# Patient Record
Sex: MALE | Race: BLACK OR AFRICAN AMERICAN | Employment: FULL TIME | URBAN - NONMETROPOLITAN AREA
[De-identification: names, ages, dates, MRNs, and addresses within clinical notes are randomized per-mention and may not be internally consistent; named-entity substitution may affect disease eponyms.]

---

## 2018-07-20 ENCOUNTER — OFFICE VISIT (OUTPATIENT)
Dept: INTERNAL MEDICINE | Age: 43
End: 2018-07-20
Payer: MEDICAID

## 2018-07-20 VITALS
OXYGEN SATURATION: 98 % | TEMPERATURE: 96.2 F | HEART RATE: 88 BPM | BODY MASS INDEX: 31.65 KG/M2 | DIASTOLIC BLOOD PRESSURE: 100 MMHG | HEIGHT: 74 IN | WEIGHT: 246.6 LBS | SYSTOLIC BLOOD PRESSURE: 140 MMHG

## 2018-07-20 DIAGNOSIS — Z11.59 ENCOUNTER FOR HEPATITIS C VIRUS SCREENING TEST FOR HIGH RISK PATIENT: ICD-10-CM

## 2018-07-20 DIAGNOSIS — F51.01 PRIMARY INSOMNIA: ICD-10-CM

## 2018-07-20 DIAGNOSIS — F41.9 ANXIETY: ICD-10-CM

## 2018-07-20 DIAGNOSIS — K64.9 HEMORRHOIDS, UNSPECIFIED HEMORRHOID TYPE: ICD-10-CM

## 2018-07-20 DIAGNOSIS — Z13.220 SCREENING FOR HYPERLIPIDEMIA: ICD-10-CM

## 2018-07-20 DIAGNOSIS — Z11.4 SCREENING FOR HIV (HUMAN IMMUNODEFICIENCY VIRUS): ICD-10-CM

## 2018-07-20 DIAGNOSIS — Z86.011 HISTORY OF MENINGIOMA OF THE BRAIN: ICD-10-CM

## 2018-07-20 DIAGNOSIS — Z91.89 ENCOUNTER FOR HEPATITIS C VIRUS SCREENING TEST FOR HIGH RISK PATIENT: ICD-10-CM

## 2018-07-20 DIAGNOSIS — I10 ESSENTIAL HYPERTENSION: ICD-10-CM

## 2018-07-20 DIAGNOSIS — Z00.00 ROUTINE GENERAL MEDICAL EXAMINATION AT A HEALTH CARE FACILITY: Primary | ICD-10-CM

## 2018-07-20 PROCEDURE — 99203 OFFICE O/P NEW LOW 30 MIN: CPT | Performed by: PHYSICIAN ASSISTANT

## 2018-07-20 RX ORDER — AMLODIPINE BESYLATE 5 MG/1
5 TABLET ORAL DAILY
COMMUNITY
End: 2018-08-17

## 2018-07-20 RX ORDER — AMITRIPTYLINE HYDROCHLORIDE 50 MG/1
50 TABLET, FILM COATED ORAL NIGHTLY
COMMUNITY
End: 2018-07-20

## 2018-07-20 RX ORDER — AMITRIPTYLINE HYDROCHLORIDE 50 MG/1
50 TABLET, FILM COATED ORAL NIGHTLY
Qty: 90 TABLET | Refills: 3 | Status: SHIPPED | OUTPATIENT
Start: 2018-07-20

## 2018-07-20 ASSESSMENT — ENCOUNTER SYMPTOMS
SINUS PRESSURE: 0
NAUSEA: 0
EYE PAIN: 0
COLOR CHANGE: 0
RHINORRHEA: 0
PHOTOPHOBIA: 0
EYE REDNESS: 0
BACK PAIN: 0
VOMITING: 0
CHEST TIGHTNESS: 0
COUGH: 0
DIARRHEA: 0
CONSTIPATION: 0
SHORTNESS OF BREATH: 0
SORE THROAT: 0
ABDOMINAL PAIN: 0
WHEEZING: 0

## 2018-07-20 ASSESSMENT — PATIENT HEALTH QUESTIONNAIRE - PHQ9
SUM OF ALL RESPONSES TO PHQ9 QUESTIONS 1 & 2: 0
2. FEELING DOWN, DEPRESSED OR HOPELESS: 0
1. LITTLE INTEREST OR PLEASURE IN DOING THINGS: 0
SUM OF ALL RESPONSES TO PHQ QUESTIONS 1-9: 0

## 2018-07-20 NOTE — PROGRESS NOTES
No Resolved Ambulatory Problems     Past Medical History:   Diagnosis Date    Headache     Hypertension        Past Medical History:   Diagnosis Date    Headache     Hypertension        Prior to Visit Medications    Medication Sig Taking? Authorizing Provider   amLODIPine (NORVASC) 5 MG tablet Take 5 mg by mouth daily Yes Historical Provider, MD   amitriptyline (ELAVIL) 50 MG tablet Take 1 tablet by mouth nightly Yes MOLLY Roberts       Past Surgical History:   Procedure Laterality Date    BRAIN SURGERY  2015    tumor removal       History reviewed. No pertinent family history. No Known Allergies    Social History     Social History    Marital status: Single     Spouse name: N/A    Number of children: N/A    Years of education: N/A     Occupational History    Not on file. Social History Main Topics    Smoking status: Current Every Day Smoker     Packs/day: 0.50     Types: Cigarettes    Smokeless tobacco: Never Used    Alcohol use Yes    Drug use: No    Sexual activity: Not on file     Other Topics Concern    Not on file     Social History Narrative    No narrative on file       Review of Systems  Review of Systems   Constitutional: Negative for activity change, appetite change, fatigue and unexpected weight change. HENT: Negative for congestion, ear pain, postnasal drip, rhinorrhea, sinus pressure, sneezing and sore throat. Eyes: Negative for photophobia, pain and redness. Respiratory: Negative for cough, chest tightness, shortness of breath and wheezing. Cardiovascular: Negative for chest pain, palpitations and leg swelling. Gastrointestinal: Negative for abdominal pain, constipation, diarrhea, nausea and vomiting. Patient states has a hemorrhoid   Genitourinary: Negative for dysuria, frequency, hematuria and urgency. Musculoskeletal: Negative for arthralgias, back pain, gait problem, joint swelling and myalgias. Skin: Negative for color change, pallor and wound. Neurological: Negative for dizziness, speech difficulty, weakness, light-headedness, numbness and headaches. Hematological: Negative for adenopathy. Does not bruise/bleed easily. Psychiatric/Behavioral: Negative for confusion. The patient is not nervous/anxious. Current Outpatient Prescriptions   Medication Sig Dispense Refill    amLODIPine (NORVASC) 5 MG tablet Take 5 mg by mouth daily      amitriptyline (ELAVIL) 50 MG tablet Take 1 tablet by mouth nightly 90 tablet 3     No current facility-administered medications for this visit. BP (!) 140/100   Pulse 88   Temp 96.2 °F (35.7 °C)   Ht 6' 2\" (1.88 m)   Wt 246 lb 9.6 oz (111.9 kg)   SpO2 98%   BMI 31.66 kg/m²     PHYSICAL EXAM  Physical Exam   Constitutional: Vital signs are normal. He appears well-developed and well-nourished. HENT:   Head: Normocephalic and atraumatic. Right Ear: External ear normal.   Left Ear: External ear normal.   Nose: Nose normal.   Eyes: Pupils are equal, round, and reactive to light. Right eye exhibits no discharge. Left eye exhibits no discharge. Neck: Normal range of motion. No tracheal deviation present. Cardiovascular: Normal rate, regular rhythm and normal heart sounds. Exam reveals no gallop and no friction rub. No murmur heard. Pulmonary/Chest: Effort normal and breath sounds normal. No respiratory distress. He has no wheezes. He has no rales. He exhibits no tenderness. Abdominal: Soft. There is no tenderness. There is no rebound and no guarding. Genitourinary:   Genitourinary Comments: Told patient that I could do a rectal exam to examine the hemorrhoids. Patient states that he is not having problems right now and would rather not have an exam.   Musculoskeletal: Normal range of motion. He exhibits no tenderness or deformity. Lymphadenopathy:     He has no cervical adenopathy. Neurological: He is alert. Skin: Skin is warm, dry and intact. No lesion and no rash noted.  No like to have the tests to look at his pancreas causes uncle had pancreatic cancer so we'll do a lipase. Patient follow-up sooner as needed. Orders Placed This Encounter   Procedures    CBC Auto Differential    Comprehensive Metabolic Panel    Lipid Panel    TSH without Reflex    HIV-1,-2 w/Reflex to HIV-1 Western Blot    Lipase    Hepatitis Panel, Acute        Return in about 4 weeks (around 8/17/2018), or if symptoms worsen or fail to improve, for physical..         All questions answered. Patient voices understanding and agrees to plans along with risks and benefits of plan. Patient is instructed to continue prior medications, diet, and exercise plans as instructed. Patient agrees to follow up as instructions, sooner if needed, or to go to ER if condition becomes emergent. Additional Instructions: As always, patient is advised to bring in medication bottles in order to correctly reconcile with our current list.      Isaias MONROY Dragon/transcription disclaimer: Much of this encounter note is electronic transcription/translation of spoken language to printed text. The electronic translation of spoken language may be erroneous, or at times, nonsensical words or phrases may be inadvertently transcribed.  Although I have reviewed the note for such errors, some may still exist.

## 2018-08-13 DIAGNOSIS — I10 ESSENTIAL HYPERTENSION: ICD-10-CM

## 2018-08-13 DIAGNOSIS — Z13.220 SCREENING FOR HYPERLIPIDEMIA: ICD-10-CM

## 2018-08-13 DIAGNOSIS — Z91.89 ENCOUNTER FOR HEPATITIS C VIRUS SCREENING TEST FOR HIGH RISK PATIENT: ICD-10-CM

## 2018-08-13 DIAGNOSIS — F51.01 PRIMARY INSOMNIA: ICD-10-CM

## 2018-08-13 DIAGNOSIS — Z11.4 SCREENING FOR HIV (HUMAN IMMUNODEFICIENCY VIRUS): ICD-10-CM

## 2018-08-13 DIAGNOSIS — Z11.59 ENCOUNTER FOR HEPATITIS C VIRUS SCREENING TEST FOR HIGH RISK PATIENT: ICD-10-CM

## 2018-08-13 DIAGNOSIS — Z00.00 ROUTINE GENERAL MEDICAL EXAMINATION AT A HEALTH CARE FACILITY: ICD-10-CM

## 2018-08-13 LAB
ALBUMIN SERPL-MCNC: 4.2 G/DL (ref 3.5–5.2)
ALP BLD-CCNC: 73 U/L (ref 40–130)
ALT SERPL-CCNC: 36 U/L (ref 5–41)
ANION GAP SERPL CALCULATED.3IONS-SCNC: 14 MMOL/L (ref 7–19)
AST SERPL-CCNC: 25 U/L (ref 5–40)
BASOPHILS ABSOLUTE: 0 K/UL (ref 0–0.2)
BASOPHILS RELATIVE PERCENT: 0.5 % (ref 0–1)
BILIRUB SERPL-MCNC: <0.2 MG/DL (ref 0.2–1.2)
BUN BLDV-MCNC: 7 MG/DL (ref 6–20)
CALCIUM SERPL-MCNC: 9.2 MG/DL (ref 8.6–10)
CHLORIDE BLD-SCNC: 102 MMOL/L (ref 98–111)
CHOLESTEROL, TOTAL: 109 MG/DL (ref 160–199)
CO2: 27 MMOL/L (ref 22–29)
CREAT SERPL-MCNC: 1.2 MG/DL (ref 0.5–1.2)
EOSINOPHILS ABSOLUTE: 0.1 K/UL (ref 0–0.6)
EOSINOPHILS RELATIVE PERCENT: 1.2 % (ref 0–5)
GFR NON-AFRICAN AMERICAN: >60
GLUCOSE BLD-MCNC: 91 MG/DL (ref 74–109)
HAV IGM SER IA-ACNC: NORMAL
HCT VFR BLD CALC: 45.6 % (ref 42–52)
HDLC SERPL-MCNC: 47 MG/DL (ref 55–121)
HEMOGLOBIN: 15.1 G/DL (ref 14–18)
HEPATITIS B CORE IGM ANTIBODY: NORMAL
HEPATITIS B SURFACE ANTIGEN INTERPRETATION: NORMAL
HEPATITIS C ANTIBODY INTERPRETATION: NORMAL
LDL CHOLESTEROL CALCULATED: 51 MG/DL
LIPASE: 110 U/L (ref 13–60)
LYMPHOCYTES ABSOLUTE: 2.2 K/UL (ref 1.1–4.5)
LYMPHOCYTES RELATIVE PERCENT: 39 % (ref 20–40)
MCH RBC QN AUTO: 31.3 PG (ref 27–31)
MCHC RBC AUTO-ENTMCNC: 33.1 G/DL (ref 33–37)
MCV RBC AUTO: 94.6 FL (ref 80–94)
MONOCYTES ABSOLUTE: 0.4 K/UL (ref 0–0.9)
MONOCYTES RELATIVE PERCENT: 6.9 % (ref 0–10)
NEUTROPHILS ABSOLUTE: 2.9 K/UL (ref 1.5–7.5)
NEUTROPHILS RELATIVE PERCENT: 52 % (ref 50–65)
PDW BLD-RTO: 13.3 % (ref 11.5–14.5)
PLATELET # BLD: 266 K/UL (ref 130–400)
PMV BLD AUTO: 9.5 FL (ref 9.4–12.4)
POTASSIUM SERPL-SCNC: 4.2 MMOL/L (ref 3.5–5)
RBC # BLD: 4.82 M/UL (ref 4.7–6.1)
SODIUM BLD-SCNC: 143 MMOL/L (ref 136–145)
TOTAL PROTEIN: 7.6 G/DL (ref 6.6–8.7)
TRIGL SERPL-MCNC: 57 MG/DL (ref 0–149)
TSH SERPL DL<=0.05 MIU/L-ACNC: 2.56 UIU/ML (ref 0.27–4.2)
WBC # BLD: 5.6 K/UL (ref 4.8–10.8)

## 2018-08-14 LAB — HIV-1 AND HIV-2 ANTIBODIES: NEGATIVE

## 2018-08-17 ENCOUNTER — OFFICE VISIT (OUTPATIENT)
Dept: INTERNAL MEDICINE | Age: 43
End: 2018-08-17
Payer: MEDICAID

## 2018-08-17 VITALS
HEIGHT: 74 IN | HEART RATE: 108 BPM | OXYGEN SATURATION: 98 % | WEIGHT: 241 LBS | BODY MASS INDEX: 30.93 KG/M2 | SYSTOLIC BLOOD PRESSURE: 130 MMHG | DIASTOLIC BLOOD PRESSURE: 100 MMHG

## 2018-08-17 DIAGNOSIS — I10 ESSENTIAL HYPERTENSION: ICD-10-CM

## 2018-08-17 DIAGNOSIS — Z00.00 ROUTINE GENERAL MEDICAL EXAMINATION AT A HEALTH CARE FACILITY: Primary | ICD-10-CM

## 2018-08-17 DIAGNOSIS — K64.9 HEMORRHOIDS, UNSPECIFIED HEMORRHOID TYPE: ICD-10-CM

## 2018-08-17 DIAGNOSIS — F41.9 ANXIETY: ICD-10-CM

## 2018-08-17 DIAGNOSIS — G44.59 OTHER COMPLICATED HEADACHE SYNDROME: ICD-10-CM

## 2018-08-17 DIAGNOSIS — F51.01 PRIMARY INSOMNIA: ICD-10-CM

## 2018-08-17 DIAGNOSIS — R74.8 ELEVATED LIPASE: ICD-10-CM

## 2018-08-17 PROCEDURE — 99214 OFFICE O/P EST MOD 30 MIN: CPT | Performed by: PHYSICIAN ASSISTANT

## 2018-08-17 RX ORDER — HYDROCHLOROTHIAZIDE 25 MG/1
25 TABLET ORAL DAILY
Qty: 30 TABLET | Refills: 3 | Status: SHIPPED | OUTPATIENT
Start: 2018-08-17 | End: 2018-08-31 | Stop reason: SDUPTHER

## 2018-08-17 ASSESSMENT — ENCOUNTER SYMPTOMS
CONSTIPATION: 0
SORE THROAT: 0
COUGH: 0
BACK PAIN: 0
CHEST TIGHTNESS: 0
WHEEZING: 0
RHINORRHEA: 0
PHOTOPHOBIA: 0
ABDOMINAL PAIN: 0
NAUSEA: 0
SHORTNESS OF BREATH: 0
ANAL BLEEDING: 1
SINUS PRESSURE: 0
VOMITING: 0
EYE PAIN: 0
DIARRHEA: 0
COLOR CHANGE: 0
EYE REDNESS: 0

## 2018-08-17 NOTE — PROGRESS NOTES
[facility]  [address] [facility] [address]  Dept: [phone]  Dept Fax: [phone]: [phone]      [facility] INTERNAL MEDICINE  OFFICE NOTE      Chief Complaint   Patient presents with    Other     4 week f/u        Tika Gonzalez is a 43y.o. year old male who is seen for 1 month follow-up for hypertension and anxiety and insomnia and hemorrhoids. Patient states is been doing well overall. Patient states that his blood pressure has been up usually around the 150s and 160s over 100. Patient has not been taking any medications. Patient denies any chest pain or shortness of breath or dizziness. Patient states that he seems to be fairly well controlled with amitriptyline along with that the amitriptyline is helping with headaches and insomnia. Patient states he tried the go off of the amitriptyline for short period time and his headaches returned so he is back on 50 mg of amitriptyline at bedtime. Patient states has not really been having many problems with his hemorrhoids. Patient denies any other complaints at this time. Active Ambulatory Problems     Diagnosis Date Noted    Anxiety 07/20/2018    Primary insomnia 07/20/2018    Essential hypertension 07/20/2018    History of meningioma of the brain 07/20/2018    Hemorrhoids 07/20/2018     Resolved Ambulatory Problems     Diagnosis Date Noted    No Resolved Ambulatory Problems     Past Medical History:   Diagnosis Date    Headache     Hypertension        Past Medical History:   Diagnosis Date    Headache     Hypertension        Prior to Visit Medications    Medication Sig Taking?  Authorizing Provider   hydrochlorothiazide (HYDRODIURIL) 25 MG tablet Take 1 tablet by mouth daily Yes MOLLY Hdz   amitriptyline (ELAVIL) 50 MG tablet Take 1 tablet by mouth nightly Yes MOLLY Hdz       Past Surgical History:   Procedure Laterality Date    BRAIN daily 30 tablet 3    amitriptyline (ELAVIL) 50 MG tablet Take 1 tablet by mouth nightly 90 tablet 3     No current facility-administered medications for this visit. BP (!) 130/100 Comment: recheck  Pulse 108   Ht 6' 2\" (1.88 m)   Wt 241 lb (109.3 kg)   SpO2 98%   BMI 30.94 kg/m²     PHYSICAL EXAM  Physical Exam   Constitutional: Vital signs are normal. He appears well-developed and well-nourished. HENT:   Head: Normocephalic and atraumatic. Right Ear: External ear normal.   Left Ear: External ear normal.   Nose: Nose normal.   Eyes: Pupils are equal, round, and reactive to light. Right eye exhibits no discharge. Left eye exhibits no discharge. Neck: Normal range of motion. No tracheal deviation present. Cardiovascular: Normal rate, regular rhythm and normal heart sounds. Exam reveals no gallop and no friction rub. No murmur heard. Pulmonary/Chest: Effort normal and breath sounds normal. No respiratory distress. He has no wheezes. He has no rales. He exhibits no tenderness. Abdominal: Soft. There is no tenderness. There is no rebound and no guarding. Musculoskeletal: Normal range of motion. He exhibits no tenderness or deformity. Lymphadenopathy:     He has no cervical adenopathy. Neurological: He is alert. Skin: Skin is warm, dry and intact. No lesion and no rash noted. No erythema. Psychiatric: He has a normal mood and affect. His mood appears not anxious. He does not exhibit a depressed mood. Nursing note and vitals reviewed. 8/14/2018 12:49 PM - Obinna Zafar Incoming Lab Results From [facility] Results     Component Value Ref Range & Units Status Collected Lab   HIV-1/HIV-2 Ab Negative  Negative Final 08/13/2018  8:12 AM [facility]   Based on the non-reactive anti-HIV (JACKY) screen, the HIV Western blot   is not   indicated and therefore not performed.    INTERPRETIVE INFORMATION: HIV-1,-2 w/Reflex to HIV-1 Western Blot   This assay should not be used for blood donor screening, associated   re-entry   protocols, or for screening Human Cells, Tissues and Cellular and   Tissue-Based Products (HCT/P). Performed by [place] [id number], [id number] [facility] [phone]   [email or web address] MD - Lab.  Director      8/13/2018  9:34 AM - Obinna Zafar Incoming Lab Results From [facility] Results     Component Value Ref Range & Units Status Collected Lab   Hep A IgM Non-Reactive  Non-reactive Final 08/13/2018  8:12 AM [facility]   Hep B Core Ab, IgM Non-Reactive  Non-reactive Final 08/13/2018  8:12 AM [facility]   Hep B S Ag Interp Non-Reactive  Non-reactive Final 08/13/2018  8:12 AM [facility]   Hep C Ab Interp Non-Reactive   Final 08/13/2018  8:12 AM [facility]   Testing Performed By     8/13/2018  9:22 AM - Obinna Zafar Incoming Lab Results From [facility]     Component Results     Component Value Ref Range & Units Status Collected Lab   Lipase 110   13 - 60 U/L Final 08/13/2018  8:12 AM [facility]           Lab Results   Component Value Date     08/13/2018    K 4.2 08/13/2018     08/13/2018    CO2 27 08/13/2018    BUN 7 08/13/2018    CREATININE 1.2 08/13/2018    GLUCOSE 91 08/13/2018    CALCIUM 9.2 08/13/2018    PROT 7.6 08/13/2018    LABALBU 4.2 08/13/2018    BILITOT <0.2 08/13/2018    ALKPHOS 73 08/13/2018    AST 25 08/13/2018    ALT 36 08/13/2018    LABGLOM >60 08/13/2018       Lab Results   Component Value Date    WBC 5.6 08/13/2018    HGB 15.1 08/13/2018    HCT 45.6 08/13/2018    MCV 94.6 (H) 08/13/2018     08/13/2018     No results found for: LABA1C  No results found for: EAG  Lab Results   Component Value Date    CHOL 109 (L) 08/13/2018     Lab Results   Component Value Date    TRIG 57 08/13/2018     Lab Results   Component Value Date    HDL 47 (L) 08/13/2018     Lab Results   Component Value Date    LDLCALC 51 08/13/2018     No results found for: LABVLDL, Patient voices understanding and agrees to plans along with risks and benefits of plan. Patient is instructed to continue prior medications, diet, and exercise plans as instructed. Patient agrees to follow up as instructions, sooner if needed, or to go to ER if condition becomes emergent. Additional Instructions: As always, patient is advised to bring in medication bottles in order to correctly reconcile with our current list.      Fariba Trejo PA-C    EMR Dragon/transcription disclaimer: Much of this encounter note is electronic transcription/translation of spoken language to printed text. The electronic translation of spoken language may be erroneous, or at times, nonsensical words or phrases may be inadvertently transcribed.  Although I have reviewed the note for such errors, some may still exist.

## 2018-08-24 DIAGNOSIS — R74.8 ELEVATED LIPASE: ICD-10-CM

## 2018-08-24 LAB — LIPASE: 36 U/L (ref 13–60)

## 2018-08-31 ENCOUNTER — OFFICE VISIT (OUTPATIENT)
Dept: INTERNAL MEDICINE | Age: 43
End: 2018-08-31
Payer: MEDICAID

## 2018-08-31 VITALS
HEIGHT: 74 IN | DIASTOLIC BLOOD PRESSURE: 90 MMHG | TEMPERATURE: 96.6 F | SYSTOLIC BLOOD PRESSURE: 120 MMHG | WEIGHT: 238 LBS | BODY MASS INDEX: 30.54 KG/M2 | HEART RATE: 88 BPM | OXYGEN SATURATION: 98 %

## 2018-08-31 DIAGNOSIS — I10 ESSENTIAL HYPERTENSION: Primary | ICD-10-CM

## 2018-08-31 DIAGNOSIS — F41.9 ANXIETY: ICD-10-CM

## 2018-08-31 DIAGNOSIS — Z13.220 SCREENING FOR HYPERLIPIDEMIA: ICD-10-CM

## 2018-08-31 PROCEDURE — 99213 OFFICE O/P EST LOW 20 MIN: CPT | Performed by: PHYSICIAN ASSISTANT

## 2018-08-31 RX ORDER — HYDROCHLOROTHIAZIDE 25 MG/1
25 TABLET ORAL DAILY
Qty: 90 TABLET | Refills: 3 | Status: SHIPPED | OUTPATIENT
Start: 2018-08-31 | End: 2019-10-10 | Stop reason: SDUPTHER

## 2018-08-31 ASSESSMENT — ENCOUNTER SYMPTOMS
VOMITING: 0
CHEST TIGHTNESS: 0
SINUS PRESSURE: 0
EYE REDNESS: 0
PHOTOPHOBIA: 0
COUGH: 0
RHINORRHEA: 0
BACK PAIN: 0
COLOR CHANGE: 0
EYE PAIN: 0
WHEEZING: 0
SORE THROAT: 0
SHORTNESS OF BREATH: 0
NAUSEA: 0
ABDOMINAL PAIN: 0
CONSTIPATION: 0
DIARRHEA: 0

## 2018-08-31 NOTE — PROGRESS NOTES
[facility]  [address] [place] [address]  Dept: [phone]  Dept Fax: [phone]: [phone]      [facility] INTERNAL MEDICINE  OFFICE NOTE      Chief Complaint   Patient presents with    Other     2 week f/u on b/p        Ovidio Espinoza is a 43y.o. year old male who is seen for Follow-up for blood pressure. Patient at the last visit was started on hydrochlorothiazide 25 mg for his blood pressure. Patient states is been monitoring his blood pressure and been in the 120s 130s over 80s. Patient denies any chest pain or shortness of breath. Patient denies any side effects from the medication. Active Ambulatory Problems     Diagnosis Date Noted    Anxiety 07/20/2018    Primary insomnia 07/20/2018    Essential hypertension 07/20/2018    History of meningioma of the brain 07/20/2018    Hemorrhoids 07/20/2018     Resolved Ambulatory Problems     Diagnosis Date Noted    No Resolved Ambulatory Problems     Past Medical History:   Diagnosis Date    Headache     Hypertension        Past Medical History:   Diagnosis Date    Headache     Hypertension        Prior to Visit Medications    Medication Sig Taking? Authorizing Provider   hydrochlorothiazide (HYDRODIURIL) 25 MG tablet Take 1 tablet by mouth daily Yes MOLLY Murray   amitriptyline (ELAVIL) 50 MG tablet Take 1 tablet by mouth nightly Yes MOLLY Murray       Past Surgical History:   Procedure Laterality Date    BRAIN SURGERY  2015    tumor removal       History reviewed. No pertinent family history. No Known Allergies    Social History     Social History    Marital status: Single     Spouse name: N/A    Number of children: N/A    Years of education: N/A     Occupational History    Not on file.      Social History Main Topics    Smoking status: Current Every Day Smoker     Packs/day: 0.50     Types: Cigarettes    Smokeless tobacco: Never Used   [facility] discharge. Left eye exhibits no discharge. Neck: Normal range of motion. Carotid bruit is not present. No tracheal deviation present. Cardiovascular: Normal rate, regular rhythm and normal heart sounds. Exam reveals no gallop and no friction rub. No murmur heard. Pulmonary/Chest: Effort normal and breath sounds normal. No respiratory distress. He has no wheezes. He has no rales. He exhibits no tenderness. Abdominal: Soft. There is no tenderness. There is no rebound and no guarding. Musculoskeletal: Normal range of motion. He exhibits no tenderness or deformity. Neurological: He is alert. Skin: Skin is warm, dry and intact. No lesion and no rash noted. No erythema. Psychiatric: He has a normal mood and affect. His mood appears not anxious. He does not exhibit a depressed mood. Nursing note and vitals reviewed. ASSESSMENT      ICD-10-CM ICD-9-CM    1. Essential hypertension I10 401.9 hydrochlorothiazide (HYDRODIURIL) 25 MG tablet      CBC Auto Differential   2. Anxiety F41.9 300.00    3. Screening for hyperlipidemia Z13.220 V77.91 Comprehensive Metabolic Panel      Lipid Panel       PLAN  Patient's blood pressure seems stable on current medication regimen continue as directed. Patient will continue to monitor blood pressure. Patient's anxiety seems fairly well controlled on the amitriptyline. Continue to try to urge patient to quit smoking. Patient's not ready to quit. Patient will follow-up in 6 months with repeat CBC, CMP, lipids. Patient follow-up sooner as needed for chest pain, shortness breath, elevated blood pressure or as needed if not improving. Orders Placed This Encounter   Procedures    CBC Auto Differential    Comprehensive Metabolic Panel    Lipid Panel        Return in about 6 months (around 2/28/2019), or if symptoms worsen or fail to improve, for Follow up with labs. All questions answered.   Patient voices understanding and agrees to plans along with risks and benefits of plan. Patient is instructed to continue prior medications, diet, and exercise plans as instructed. Patient agrees to follow up as instructions, sooner if needed, or to go to ER if condition becomes emergent. Additional Instructions: As always, patient is advised to bring in medication bottles in order to correctly reconcile with our current list.      Corena Osler PA-C    EMR Dragon/transcription disclaimer: Much of this encounter note is electronic transcription/translation of spoken language to printed text. The electronic translation of spoken language may be erroneous, or at times, nonsensical words or phrases may be inadvertently transcribed.  Although I have reviewed the note for such errors, some may still exist.

## 2018-09-17 ENCOUNTER — TELEPHONE (OUTPATIENT)
Dept: INTERNAL MEDICINE | Age: 43
End: 2018-09-17

## 2018-09-18 DIAGNOSIS — A49.9 BACTERIAL INFECTION: Primary | ICD-10-CM

## 2018-09-18 RX ORDER — AZITHROMYCIN 500 MG/1
1000 TABLET, FILM COATED ORAL ONCE
Qty: 2 TABLET | Refills: 0 | Status: SHIPPED | OUTPATIENT
Start: 2018-09-18 | End: 2018-09-18

## 2018-09-18 RX ORDER — DOXYCYCLINE HYCLATE 100 MG
100 TABLET ORAL 2 TIMES DAILY
Qty: 20 TABLET | Refills: 0 | Status: SHIPPED | OUTPATIENT
Start: 2018-09-18 | End: 2018-09-28

## 2018-10-15 ENCOUNTER — HOSPITAL ENCOUNTER (OUTPATIENT)
Facility: HOSPITAL | Age: 43
Setting detail: OBSERVATION
Discharge: HOME | End: 2018-10-16
Attending: EMERGENCY MEDICINE | Admitting: EMERGENCY MEDICINE

## 2018-10-15 ENCOUNTER — APPOINTMENT (OUTPATIENT)
Dept: MRI IMAGING | Facility: HOSPITAL | Age: 43
End: 2018-10-15

## 2018-10-15 ENCOUNTER — APPOINTMENT (OUTPATIENT)
Dept: GENERAL RADIOLOGY | Facility: HOSPITAL | Age: 43
End: 2018-10-15

## 2018-10-15 ENCOUNTER — APPOINTMENT (OUTPATIENT)
Dept: CT IMAGING | Facility: HOSPITAL | Age: 43
End: 2018-10-15

## 2018-10-15 ENCOUNTER — APPOINTMENT (OUTPATIENT)
Dept: NEUROLOGY | Facility: HOSPITAL | Age: 43
End: 2018-10-15
Attending: PSYCHIATRY & NEUROLOGY

## 2018-10-15 DIAGNOSIS — R56.9 SEIZURE (HCC): Primary | ICD-10-CM

## 2018-10-15 LAB
ALBUMIN SERPL-MCNC: 3.9 G/DL (ref 3.5–5)
ALBUMIN/GLOB SERPL: 1.3 G/DL (ref 1.1–2.5)
ALP SERPL-CCNC: 66 U/L (ref 24–120)
ALT SERPL W P-5'-P-CCNC: 53 U/L (ref 0–54)
AMPHET+METHAMPHET UR QL: NEGATIVE
ANION GAP SERPL CALCULATED.3IONS-SCNC: 18 MMOL/L (ref 4–13)
AST SERPL-CCNC: 43 U/L (ref 7–45)
BARBITURATES UR QL SCN: NEGATIVE
BASOPHILS # BLD MANUAL: 0.24 10*3/MM3 (ref 0–0.2)
BASOPHILS NFR BLD AUTO: 2.1 % (ref 0–2)
BENZODIAZ UR QL SCN: POSITIVE
BILIRUB SERPL-MCNC: 0.4 MG/DL (ref 0.1–1)
BUN BLD-MCNC: 16 MG/DL (ref 5–21)
BUN/CREAT SERPL: 9.6 (ref 7–25)
CALCIUM SPEC-SCNC: 9.7 MG/DL (ref 8.4–10.4)
CANNABINOIDS SERPL QL: NEGATIVE
CHLORIDE SERPL-SCNC: 98 MMOL/L (ref 98–110)
CK SERPL-CCNC: 165 U/L (ref 0–203)
CLUMPED PLATELETS: PRESENT
CO2 SERPL-SCNC: 21 MMOL/L (ref 24–31)
COCAINE UR QL: NEGATIVE
CREAT BLD-MCNC: 1.66 MG/DL (ref 0.5–1.4)
DEPRECATED RDW RBC AUTO: 45.3 FL (ref 40–54)
EOSINOPHIL # BLD MANUAL: 0.49 10*3/MM3 (ref 0–0.7)
EOSINOPHIL NFR BLD MANUAL: 4.2 % (ref 0–4)
ERYTHROCYTE [DISTWIDTH] IN BLOOD BY AUTOMATED COUNT: 13.1 % (ref 12–15)
GFR SERPL CREATININE-BSD FRML MDRD: 55 ML/MIN/1.73
GLOBULIN UR ELPH-MCNC: 3 GM/DL
GLUCOSE BLD-MCNC: 144 MG/DL (ref 70–100)
GLUCOSE BLDC GLUCOMTR-MCNC: 164 MG/DL (ref 70–130)
HCT VFR BLD AUTO: 47.4 % (ref 40–52)
HGB BLD-MCNC: 15.6 G/DL (ref 14–18)
HOLD SPECIMEN: NORMAL
HOLD SPECIMEN: NORMAL
INR PPP: 0.95 (ref 0.91–1.09)
LYMPHOCYTES # BLD MANUAL: 3.38 10*3/MM3 (ref 0.72–4.86)
LYMPHOCYTES NFR BLD MANUAL: 29.2 % (ref 15–45)
LYMPHOCYTES NFR BLD MANUAL: 6.3 % (ref 4–12)
MAGNESIUM SERPL-MCNC: 2.2 MG/DL (ref 1.4–2.2)
MCH RBC QN AUTO: 31.1 PG (ref 28–32)
MCHC RBC AUTO-ENTMCNC: 32.9 G/DL (ref 33–36)
MCV RBC AUTO: 94.6 FL (ref 82–95)
METAMYELOCYTES NFR BLD MANUAL: 1 % (ref 0–0)
METHADONE UR QL SCN: NEGATIVE
MONOCYTES # BLD AUTO: 0.73 10*3/MM3 (ref 0.19–1.3)
MYOGLOBIN SERPL-MCNC: 447.5 NG/ML (ref 0–110)
NEUTROPHILS # BLD AUTO: 4.59 10*3/MM3 (ref 1.87–8.4)
NEUTROPHILS NFR BLD MANUAL: 39.6 % (ref 39–78)
OPIATES UR QL: POSITIVE
PCP UR QL SCN: NEGATIVE
PLATELET # BLD AUTO: 360 10*3/MM3 (ref 130–400)
PMV BLD AUTO: 10 FL (ref 6–12)
POIKILOCYTOSIS BLD QL SMEAR: ABNORMAL
POTASSIUM BLD-SCNC: 3.9 MMOL/L (ref 3.5–5.3)
PROT SERPL-MCNC: 6.9 G/DL (ref 6.3–8.7)
PROTHROMBIN TIME: 13 SECONDS (ref 11.9–14.6)
RBC # BLD AUTO: 5.01 10*6/MM3 (ref 4.8–5.9)
SMUDGE CELLS BLD QL SMEAR: ABNORMAL
SODIUM BLD-SCNC: 137 MMOL/L (ref 135–145)
T4 FREE SERPL-MCNC: 0.95 NG/DL (ref 0.78–2.19)
TROPONIN I SERPL-MCNC: <0.012 NG/ML (ref 0–0.03)
TSH SERPL DL<=0.05 MIU/L-ACNC: 3.23 MIU/ML (ref 0.47–4.68)
VARIANT LYMPHS NFR BLD MANUAL: 17.7 % (ref 0–5)
WBC NRBC COR # BLD: 11.59 10*3/MM3 (ref 4.8–10.8)
WHOLE BLOOD HOLD SPECIMEN: NORMAL
WHOLE BLOOD HOLD SPECIMEN: NORMAL

## 2018-10-15 PROCEDURE — 85610 PROTHROMBIN TIME: CPT | Performed by: EMERGENCY MEDICINE

## 2018-10-15 PROCEDURE — 70553 MRI BRAIN STEM W/O & W/DYE: CPT

## 2018-10-15 PROCEDURE — 70450 CT HEAD/BRAIN W/O DYE: CPT

## 2018-10-15 PROCEDURE — 83735 ASSAY OF MAGNESIUM: CPT | Performed by: EMERGENCY MEDICINE

## 2018-10-15 PROCEDURE — 94799 UNLISTED PULMONARY SVC/PX: CPT

## 2018-10-15 PROCEDURE — 0 IOPAMIDOL PER 1 ML: Performed by: EMERGENCY MEDICINE

## 2018-10-15 PROCEDURE — 99285 EMERGENCY DEPT VISIT HI MDM: CPT

## 2018-10-15 PROCEDURE — 80307 DRUG TEST PRSMV CHEM ANLYZR: CPT | Performed by: EMERGENCY MEDICINE

## 2018-10-15 PROCEDURE — 80050 GENERAL HEALTH PANEL: CPT | Performed by: EMERGENCY MEDICINE

## 2018-10-15 PROCEDURE — 82550 ASSAY OF CK (CPK): CPT | Performed by: EMERGENCY MEDICINE

## 2018-10-15 PROCEDURE — 0 GADOBENATE DIMEGLUMINE 529 MG/ML SOLUTION: Performed by: PSYCHIATRY & NEUROLOGY

## 2018-10-15 PROCEDURE — 93005 ELECTROCARDIOGRAM TRACING: CPT | Performed by: EMERGENCY MEDICINE

## 2018-10-15 PROCEDURE — 93010 ELECTROCARDIOGRAM REPORT: CPT | Performed by: INTERNAL MEDICINE

## 2018-10-15 PROCEDURE — 96361 HYDRATE IV INFUSION ADD-ON: CPT

## 2018-10-15 PROCEDURE — 71045 X-RAY EXAM CHEST 1 VIEW: CPT

## 2018-10-15 PROCEDURE — A9577 INJ MULTIHANCE: HCPCS | Performed by: PSYCHIATRY & NEUROLOGY

## 2018-10-15 PROCEDURE — 95816 EEG AWAKE AND DROWSY: CPT

## 2018-10-15 PROCEDURE — 85007 BL SMEAR W/DIFF WBC COUNT: CPT | Performed by: EMERGENCY MEDICINE

## 2018-10-15 PROCEDURE — 83874 ASSAY OF MYOGLOBIN: CPT | Performed by: EMERGENCY MEDICINE

## 2018-10-15 PROCEDURE — G0378 HOSPITAL OBSERVATION PER HR: HCPCS

## 2018-10-15 PROCEDURE — 25010000002 FOSPHENYTOIN 500 MG PE/10ML SOLUTION 10 ML VIAL: Performed by: EMERGENCY MEDICINE

## 2018-10-15 PROCEDURE — 84484 ASSAY OF TROPONIN QUANT: CPT | Performed by: EMERGENCY MEDICINE

## 2018-10-15 PROCEDURE — 84439 ASSAY OF FREE THYROXINE: CPT | Performed by: EMERGENCY MEDICINE

## 2018-10-15 PROCEDURE — 94760 N-INVAS EAR/PLS OXIMETRY 1: CPT

## 2018-10-15 PROCEDURE — 70496 CT ANGIOGRAPHY HEAD: CPT

## 2018-10-15 PROCEDURE — 82962 GLUCOSE BLOOD TEST: CPT

## 2018-10-15 PROCEDURE — 99219 PR INITIAL OBSERVATION CARE/DAY 50 MINUTES: CPT | Performed by: PSYCHIATRY & NEUROLOGY

## 2018-10-15 PROCEDURE — 95816 EEG AWAKE AND DROWSY: CPT | Performed by: PSYCHIATRY & NEUROLOGY

## 2018-10-15 PROCEDURE — 96365 THER/PROPH/DIAG IV INF INIT: CPT

## 2018-10-15 RX ORDER — DIVALPROEX SODIUM 500 MG/1
1000 TABLET, DELAYED RELEASE ORAL ONCE
Status: COMPLETED | OUTPATIENT
Start: 2018-10-15 | End: 2018-10-15

## 2018-10-15 RX ORDER — SODIUM CHLORIDE 0.9 % (FLUSH) 0.9 %
10 SYRINGE (ML) INJECTION AS NEEDED
Status: DISCONTINUED | OUTPATIENT
Start: 2018-10-15 | End: 2018-10-16 | Stop reason: HOSPADM

## 2018-10-15 RX ORDER — HYDROCHLOROTHIAZIDE 25 MG/1
25 TABLET ORAL DAILY
Status: DISCONTINUED | OUTPATIENT
Start: 2018-10-15 | End: 2018-10-16 | Stop reason: HOSPADM

## 2018-10-15 RX ORDER — SODIUM CHLORIDE 0.9 % (FLUSH) 0.9 %
3 SYRINGE (ML) INJECTION EVERY 12 HOURS SCHEDULED
Status: DISCONTINUED | OUTPATIENT
Start: 2018-10-15 | End: 2018-10-16 | Stop reason: HOSPADM

## 2018-10-15 RX ORDER — AMITRIPTYLINE HYDROCHLORIDE 50 MG/1
50 TABLET, FILM COATED ORAL NIGHTLY
COMMUNITY
End: 2018-11-02 | Stop reason: DRUGHIGH

## 2018-10-15 RX ORDER — DIVALPROEX SODIUM 500 MG/1
500 TABLET, DELAYED RELEASE ORAL EVERY 8 HOURS SCHEDULED
Status: DISCONTINUED | OUTPATIENT
Start: 2018-10-15 | End: 2018-10-16 | Stop reason: HOSPADM

## 2018-10-15 RX ORDER — ACETAMINOPHEN 325 MG/1
650 TABLET ORAL EVERY 4 HOURS PRN
Status: DISCONTINUED | OUTPATIENT
Start: 2018-10-15 | End: 2018-10-16 | Stop reason: HOSPADM

## 2018-10-15 RX ORDER — LORAZEPAM 2 MG/ML
2 INJECTION INTRAMUSCULAR EVERY 4 HOURS PRN
Status: DISCONTINUED | OUTPATIENT
Start: 2018-10-15 | End: 2018-10-16 | Stop reason: HOSPADM

## 2018-10-15 RX ORDER — AMITRIPTYLINE HYDROCHLORIDE 25 MG/1
50 TABLET, FILM COATED ORAL NIGHTLY
Status: DISCONTINUED | OUTPATIENT
Start: 2018-10-15 | End: 2018-10-16 | Stop reason: HOSPADM

## 2018-10-15 RX ORDER — HYDROCHLOROTHIAZIDE 25 MG/1
25 TABLET ORAL DAILY
COMMUNITY

## 2018-10-15 RX ORDER — SODIUM CHLORIDE 9 MG/ML
100 INJECTION, SOLUTION INTRAVENOUS CONTINUOUS
Status: DISCONTINUED | OUTPATIENT
Start: 2018-10-15 | End: 2018-10-16 | Stop reason: HOSPADM

## 2018-10-15 RX ORDER — SODIUM CHLORIDE 0.9 % (FLUSH) 0.9 %
3-10 SYRINGE (ML) INJECTION AS NEEDED
Status: DISCONTINUED | OUTPATIENT
Start: 2018-10-15 | End: 2018-10-16 | Stop reason: HOSPADM

## 2018-10-15 RX ADMIN — AMITRIPTYLINE HYDROCHLORIDE 50 MG: 25 TABLET, FILM COATED ORAL at 21:04

## 2018-10-15 RX ADMIN — LIDOCAINE HYDROCHLORIDE 30 ML: 20 SOLUTION ORAL; TOPICAL at 14:24

## 2018-10-15 RX ADMIN — IOPAMIDOL 100 ML: 755 INJECTION, SOLUTION INTRAVENOUS at 05:10

## 2018-10-15 RX ADMIN — SODIUM CHLORIDE 100 ML/HR: 9 INJECTION, SOLUTION INTRAVENOUS at 12:19

## 2018-10-15 RX ADMIN — DIVALPROEX SODIUM 1000 MG: 500 TABLET, DELAYED RELEASE ORAL at 12:25

## 2018-10-15 RX ADMIN — ACETAMINOPHEN 650 MG: 325 TABLET, FILM COATED ORAL at 21:04

## 2018-10-15 RX ADMIN — HYDROCHLOROTHIAZIDE 25 MG: 25 TABLET ORAL at 12:25

## 2018-10-15 RX ADMIN — SODIUM CHLORIDE 1500 MG PE: 9 INJECTION, SOLUTION INTRAVENOUS at 05:23

## 2018-10-15 RX ADMIN — DIVALPROEX SODIUM 500 MG: 500 TABLET, DELAYED RELEASE ORAL at 21:04

## 2018-10-15 RX ADMIN — GADOBENATE DIMEGLUMINE 20 ML: 529 INJECTION, SOLUTION INTRAVENOUS at 12:00

## 2018-10-15 RX ADMIN — DIVALPROEX SODIUM 500 MG: 500 TABLET, DELAYED RELEASE ORAL at 14:24

## 2018-10-15 NOTE — ED NOTES
SEIZURE PRECAUTIONS INITIATED (PADDED RAILING, SIDE RAILS RAISED, FAMILY AT BEDSIDE, AND CALL LIGHT WITHIN REACH .     Kemal Wasserman RN  10/15/18 [id number]

## 2018-10-15 NOTE — PLAN OF CARE
Problem: Patient Care Overview  Goal: Plan of Care Review  Outcome: Ongoing (interventions implemented as appropriate)   10/15/18 1649   Coping/Psychosocial   Plan of Care Reviewed With patient;significant other;family   Plan of Care Review   Progress improving   OTHER   Outcome Summary no seizure activity noted today. eeg and mri done today awaiting results. no c/o pain. family and girlfriend at bedside . safety maintained.

## 2018-10-15 NOTE — ED PROVIDER NOTES
"Subjective   This is a 43-year-old male who presents to the emergency department via EMS for evaluation after witnessed seizure-like activity.  Patient does not have a known history of seizures.  In 2015, in [place], patient had a meningioma removed.  He has been on amitriptyline since.  For the last 1 week patient has had worsening right-sided headaches with a sensation of pressure in the right forehead and behind the right eye.  No loss of vision.  No drooping of the face.  No other neurologic changes.  His significant other woke up to some mild shaking.  She indicates that this lasted only several seconds and he talked to her shortly afterwards.  He then started to have diffuse and \"more severe\" rhythmic shaking which lasted 30-40 seconds.  After this he was slightly disoriented.  He then had several more seizure events without return of consciousness.  EMS providers were on scene and noted 5 additional seizure events without return of consciousness.  A total of 5mg of Versed was given.  They noted that his left face and left extremities were contracted more so than the right.  No fall.  No recent head injury.  No recent fever.  No recent complaints of chest or abdominal discomfort.  No recent vomiting.  No recent difficulty breathing.  Patient appears to be postictal at this time and is unable to provide additional history or participate in a review of systems.            Review of Systems   Unable to perform ROS: Mental status change       Past Medical History:   Diagnosis Date   • Drug abuse (CMS/HCC)    • Hypertension        No Known Allergies    Past Surgical History:   Procedure Laterality Date   • TESTICLE TORSION REPAIR         History reviewed. No pertinent family history.    Social History     Social History   • Marital status: Single     Social History Main Topics   • Smoking status: Current Every Day Smoker     Packs/day: 1.00   • Alcohol use No   • Drug use: No     Other Topics Concern   • Not on " file           Objective   Physical Exam   Constitutional: He appears well-developed and well-nourished.   Well-built -American male.   HENT:   Head: Normocephalic and atraumatic.   Eyes: Pupils are equal, round, and reactive to light. EOM are normal.   Neck: Normal range of motion. Neck supple. No JVD present. No tracheal deviation present.   Cardiovascular: Regular rhythm and normal heart sounds.    Tachycardic   Pulmonary/Chest: Effort normal and breath sounds normal. No respiratory distress. He has no wheezes. He has no rales. He exhibits no tenderness.   Abdominal: Soft. Bowel sounds are normal. There is no tenderness. There is no guarding.   Musculoskeletal: He exhibits no edema or deformity.   Neurological:   He is somnolent but arousable.  He does appear to attempt to answer questions.  He does attempt to follow commands.  Patient has weak but present  strength on the right.  Does follow commands with RLE.  Patient does not follow commands with the left side.  He does appear to have a mild left facial droop.  Does not answer orientation questions.  Additional neurologic exam is unable to be obtained.   Skin: Skin is warm and dry. Capillary refill takes less than 2 seconds.   Psychiatric: He has a normal mood and affect. His behavior is normal.   Nursing note and vitals reviewed.      Procedures           ED Course      Patient seen immediately upon arrival    EKG at 4:34 AM shows sinus tachycardia with a rate of 139 bpm.  Intervals within normal limits.  Right axis deviation.  Poor R-wave progression.  T-wave inversions in lead 3.  No ST elevation or evidence for acute infarction.    Patient sent for CT imaging    1500 mg of fosphenytoin ordered    Labs reviewed    CT scans interpreted by the radiologist    CT head:  No acute infarction or hemorrhage   Chronic bilateral inferior frontal lobe infarctions    CTA head:  Negative    Labs Reviewed   COMPREHENSIVE METABOLIC PANEL - Abnormal; Notable for  the following:        Result Value    Glucose 144 (*)     Creatinine 1.66 (*)     CO2 21.0 (*)     eGFR   Amer 55 (*)     Anion Gap 18.0 (*)     All other components within normal limits   CBC WITH AUTO DIFFERENTIAL - Abnormal; Notable for the following:     WBC 11.59 (*)     MCHC 32.9 (*)     All other components within normal limits   URINE DRUG SCREEN - Abnormal; Notable for the following:     Benzodiazepine Screen, Urine Positive (*)     Opiate Screen Positive (*)     All other components within normal limits    Narrative:     Negative Thresholds For Drugs Screened in Urine:    Amphetamines          500 ng/ml  Barbiturates          200 ng/ml  Benzodiazepines       200 ng/ml  Cocaine               150 ng/ml  Methadone             150 ng/ml  Opiates               300 ng/ml  Phencyclidine         25 ng/ml  THC                      50 ng/ml    The normal value for all drugs tested is negative. This report includes final unconfirmed screening results.  A positive result by this assay can be, at your request, sent to the Reference Lab for confirmation by gas chromatography. Unconfirmed results must not be used for non-medical purposes, such as employment or legal testing. Clinical consideration should be applied to any drug of abuse test result, particularly when unconfirmed results are used.   MYOGLOBIN, SERUM - Abnormal; Notable for the following:     Myoglobin 447.5 (*)     All other components within normal limits   POCT GLUCOSE FINGERSTICK - Abnormal; Notable for the following:     Glucose 164 (*)     All other components within normal limits   MANUAL DIFFERENTIAL - Abnormal; Notable for the following:     Eosinophil % 4.2 (*)     Basophil % 2.1 (*)     Metamyelocyte % 1.0 (*)     Atypical Lymphocyte % 17.7 (*)     Basophils Absolute 0.24 (*)     All other components within normal limits   PROTIME-INR - Normal   CK - Normal   TSH - Normal   MAGNESIUM - Normal   T4, FREE - Normal   TROPONIN (IN-HOUSE) -  Normal   RAINBOW DRAW    Narrative:     The following orders were created for panel order [place] Draw.  Procedure                               Abnormality         Status                     ---------                               -----------         ------                     Light Blue Top[[id number]]                                   Final result               Green Top (Gel)[[id number]]                                  Final result               Lavender Top[[id number]]                                     Final result               Red Top[[id number]]                                          Final result                 Please view results for these tests on the individual orders.   POCT GLUCOSE FINGERSTICK   CBC AND DIFFERENTIAL    Narrative:     The following orders were created for panel order CBC & Differential.  Procedure                               Abnormality         Status                     ---------                               -----------         ------                     Manual Differential[[id number]]          Abnormal            Final result               CBC Auto Differential[[id number]]        Abnormal            Final result                 Please view results for these tests on the individual orders.   LIGHT BLUE TOP   GREEN TOP   LAVENDER TOP   RED TOP     X-ray chest as interpreted by myself shows no focal pulmonary consolidation    Patient and significant other updated on all results  His mentation is slightly improved  Improving neuro findings on the left  Heart rate in 80s-90s  BP stable    Dr. Crawley consulted and accepts admission   No further orders            MDM  Number of Diagnoses or Management Options  Seizure (CMS/[facility]): new and requires workup     Amount and/or Complexity of Data Reviewed  Clinical lab tests: reviewed and ordered  Tests in the radiology section of CPT®: reviewed and ordered  Tests in the medicine section of CPT®: ordered and reviewed  Decide to obtain previous  medical records or to obtain history from someone other than the patient: yes  Obtain history from someone other than the patient: yes  Review and summarize past medical records: yes  Discuss the patient with other providers: yes  Independent visualization of images, tracings, or specimens: yes    Risk of Complications, Morbidity, and/or Mortality  Presenting problems: high  Diagnostic procedures: high  Management options: high    Critical Care  Total time providing critical care: 30-74 minutes    Patient Progress  Patient progress: improved     A stroke alert was not called despite his left-sided deficits given his seizure activity  He is not a tPA candidate and 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 Crawley who accepts admission to his service  Request a 3A bed  No further orders at this time          Final diagnoses:   Seizure (CMS/[facility])            Ronnie Lund, DO  10/15/18 0628

## 2018-10-15 NOTE — H&P
Neurology History & Physical    Indication for Admission:  Seizures    History of present illness:  This is a 43-year-old Afro-American male with a past history of bilateral frontal meningioma resections in 2015.  He reportedly took Keppra just after surgery and it made him sick.  He stopped it shortly afterwards.  He has had no issues until the past several days.  His girlfriend is currently at the bedside and assists in the history.  She tells me that for the past several days he has had episodes of staring off in space and not being responsive.  He went to bed last night at 11 PM.  At 3 AM this morning she woke up and found him to have increased tone and rhythmic jerking of the left face, arm, and leg.  This lasted less than 15 seconds but occur approximately 6-10 times.  He had additional seizures in the ambulance on the way to the hospital and several in the hospital as well.  These were eventually stopped after multiple rounds of Versed and a load of fosphenytoin.  He did have tongue biting with this but no urinary incontinence.  He has no previous history of seizures.        Past Medical History:   Diagnosis Date   • Drug abuse (CMS/[facility])    • Hypertension        No Known Allergies  Prescriptions Prior to Admission   Medication Sig Dispense Refill Last Dose   • amitriptyline (ELAVIL) 50 MG tablet Take 50 mg by mouth Every Night.      • hydrochlorothiazide (HYDRODIURIL) 25 MG tablet Take 25 mg by mouth Daily.          Social History     Social History   • Marital status: Single     Spouse name: N/A   • Number of children: N/A   • Years of education: N/A     Occupational History   • Not on file.     Social History Main Topics   • Smoking status: Current Every Day Smoker     Packs/day: 1.00   • Smokeless tobacco: Not on file   • Alcohol use No   • Drug use: No   • Sexual activity: Not on file     Other Topics Concern   • Not on file     Social History Narrative   • No narrative on file   Patient lives with his  girlfriend.  He is a  by [employer].  He smokes one pack of cigarettes per day.  He denies significant alcohol use.  He denies drug abuse.  History reviewed. No pertinent family history.    Review of Systems  Review of Systems - a 14 point review of systems was performed.  He was positive for seizure activity as noted above.    Vital Signs   Temp:  [97.8 °F (36.6 °C)-98.4 °F (36.9 °C)] 98.4 °F (36.9 °C)  Heart Rate:  [] 98  Resp:  [16-20] 16  BP: (109-124)/(52-82) 118/70    General Exam:  Head:  A healed craniotomy scar is visible across the scalp.  HEENT:  Neck supple  Fundoscopic Exam:  No signs of disc edema  CVS:  Regular rate and rhythm.  No murmurs  Carotid Examination:  No bruits  Lungs:  Clear to auscultation  Abdomen:  Non-tender, Non-distended  Extremities:  No signs of peripheral edema  Skin:  No rashes    Neurologic Exam:    Mental Status:    -Awake, Alert, Oriented X 3  -No word finding difficulties  -No aphasia  -No dysarthria  -Follows simple and complex commands    CN II:  Visual fields full.  Pupils equally reactive to light  CN III, IV, VI:  Extraocular Muscles full with no signs of nystagmus  CN V:  Facial sensory is symmetric with no asymmetries.  CN VII:  Facial motor symmetric  CN VIII:  Gross hearing intact bilaterally  CN IX:  Palate elevates symmetrically  CN X:  Palate elevates symmetrically  CN XI:  Shoulder shrug symmetric  CN XII:  Tongue is midline on protrusion    Motor: (strength out of 5:  1= minimal movement, 2 = movement in plane of gravity, 3 = movement against gravity, 4 = movement against some resistance, 5 = full strength)    -Right Upper Ext: Proximal: 5 Distal: 5  -Left Upper Ext: Proximal: 5 Distal: 5    -Right Lower Ext: Proximal: 5 Distal: 5  -Left Lower Ext: Proximal: 5 Distal: 5    DTR:  -Right   Bicep: 2+ Tricep: 2+ Brachoradialis: 2+   Patella: 2+ Ankle: 2+ Neg Babinski  -Left   Bicep: 2+ Tricep: 2+ Brachoradialis: 2+   Patella: 2+ Ankle: 2+ Neg  Babinski    Sensory:  -Intact to light touch, pinprick, temperature, pain, and proprioception    Coordination:  -Finger to nose intact  -Heel to shin intact  -No ataxia    Gait  -No signs of ataxia  -ambulates unassisted      Results Review:  Lab Results (last 7 days)     Procedure Component Value Units Date/Time    Urine Drug Screen - Urine, Clean Catch [[id number]]  (Abnormal) Collected:  10/15/18 0525    Specimen:  Urine from Urine, Catheter Updated:  10/15/18 0605     Amphetamine Screen, Urine Negative     Barbiturates Screen, Urine Negative     Benzodiazepine Screen, Urine Positive (A)     Cocaine Screen, Urine Negative     Methadone Screen, Urine Negative     Opiate Screen Positive (A)     Phencyclidine (PCP), Urine Negative     THC, Screen, Urine Negative    Narrative:       Negative Thresholds For Drugs Screened in Urine:    Amphetamines          500 ng/ml  Barbiturates          200 ng/ml  Benzodiazepines       200 ng/ml  Cocaine               150 ng/ml  Methadone             150 ng/ml  Opiates               300 ng/ml  Phencyclidine         25 ng/ml  THC                      50 ng/ml    The normal value for all drugs tested is negative. This report includes final unconfirmed screening results.  A positive result by this assay can be, at your request, sent to the Reference Lab for confirmation by gas chromatography. Unconfirmed results must not be used for non-medical purposes, such as employment or legal testing. Clinical consideration should be applied to any drug of abuse test result, particularly when unconfirmed results are used.    Troponin [[id number]]  (Normal) Collected:  10/15/18 0520    Specimen:  Blood Updated:  10/15/18 0603     Troponin I <0.012 ng/mL      Comment: Specimen hemolyzed.  Results may be affected.       TSH [[id number]]  (Normal) Collected:  10/15/18 0436    Specimen:  Blood Updated:  10/15/18 0556     TSH 3.230 mIU/mL     Myoglobin, Serum [[id number]]  (Abnormal) Collected:  10/15/18  0520    Specimen:  Blood Updated:  10/15/18 0553     Myoglobin 447.5 (H) ng/mL     [place] Draw [[id number]] Collected:  10/15/18 0436    Specimen:  Blood Updated:  10/15/18 0546    Narrative:       The following orders were created for panel order [place] Draw.  Procedure                               Abnormality         Status                     ---------                               -----------         ------                     Light Blue Top[[id number]]                                   Final result               Green Top (Gel)[[id number]]                                  Final result               Lavender Top[[id number]]                                     Final result               Red Top[[id number]]                                          Final result                 Please view results for these tests on the individual orders.    Light Blue Top [[id number]] Collected:  10/15/18 0436    Specimen:  Blood Updated:  10/15/18 0546     Extra Tube hold for add-on     Comment: Auto resulted       Green Top (Gel) [[id number]] Collected:  10/15/18 0436    Specimen:  Blood Updated:  10/15/18 0546     Extra Tube Hold for add-ons.     Comment: Auto resulted.       Lavender Top [[id number]] Collected:  10/15/18 0436    Specimen:  Blood Updated:  10/15/18 0546     Extra Tube hold for add-on     Comment: Auto resulted       Red Top [[id number]] Collected:  10/15/18 0436    Specimen:  Blood Updated:  10/15/18 0546     Extra Tube Hold for add-ons.     Comment: Auto resulted.       Magnesium [[id number]]  (Normal) Collected:  10/15/18 0520    Specimen:  Blood Updated:  10/15/18 0545     Magnesium 2.2 mg/dL      Comment: Specimen hemolyzed.  Results may be affected.       Comprehensive Metabolic Panel [[id number]]  (Abnormal) Collected:  10/15/18 0520    Specimen:  Blood Updated:  10/15/18 0544     Glucose 144 (H) mg/dL      BUN 16 mg/dL      Creatinine 1.66 (H) mg/dL      Sodium 137 mmol/L      Potassium 3.9 mmol/L      Comment:  Specimen hemolyzed.  Results may be affected.        Chloride 98 mmol/L      CO2 21.0 (L) mmol/L      Calcium 9.7 mg/dL      Total Protein 6.9 g/dL      Comment: Specimen hemolyzed.  Results may be affected.        Albumin 3.90 g/dL      Comment: Specimen hemolyzed. Results may be affected.        ALT (SGPT) 53 U/L      Comment: Specimen hemolyzed.  Results may be affected.        AST (SGOT) 43 U/L      Comment: Specimen hemolyzed.  Results may be affected.        Alkaline Phosphatase 66 U/L      Comment: Specimen hemolyzed. Results may be affected.        Total Bilirubin 0.4 mg/dL      eGFR  African Amer 55 (L) mL/min/1.73      Globulin 3.0 gm/dL      A/G Ratio 1.3 g/dL      BUN/Creatinine Ratio 9.6     Anion Gap 18.0 (H) mmol/L     T4, Free [[id number]]  (Normal) Collected:  10/15/18 0436    Specimen:  Blood Updated:  10/15/18 0543     Free T4 0.95 ng/dL     CK [[id number]]  (Normal) Collected:  10/15/18 0520    Specimen:  Blood Updated:  10/15/18 0541     Creatine Kinase 165 U/L     CBC & Differential [[id number]] Collected:  10/15/18 0436    Specimen:  Blood Updated:  10/15/18 0511    Narrative:       The following orders were created for panel order CBC & Differential.  Procedure                               Abnormality         Status                     ---------                               -----------         ------                     Manual Differential[[id number]]          Abnormal            Final result               CBC Auto Differential[[id number]]        Abnormal            Final result                 Please view results for these tests on the individual orders.    Manual Differential [[id number]]  (Abnormal) Collected:  10/15/18 0436    Specimen:  Blood Updated:  10/15/18 0511     Neutrophil % 39.6 %      Lymphocyte % 29.2 %      Monocyte % 6.3 %      Eosinophil % 4.2 (H) %      Basophil % 2.1 (H) %      Metamyelocyte % 1.0 (H) %      Atypical Lymphocyte % 17.7 (H) %      Neutrophils Absolute 4.59  10*3/mm3      Lymphocytes Absolute 3.38 10*3/mm3      Monocytes Absolute 0.73 10*3/mm3      Eosinophils Absolute 0.49 10*3/mm3      Basophils Absolute 0.24 (H) 10*3/mm3      Poikilocytes Slight/1+     Smudge Cells Slight/1+     Clumped Platelets Present    CBC Auto Differential [[id number]]  (Abnormal) Collected:  10/15/18 0436    Specimen:  Blood Updated:  10/15/18 0511     WBC 11.59 (H) 10*3/mm3      RBC 5.01 10*6/mm3      Hemoglobin 15.6 g/dL      Hematocrit 47.4 %      MCV 94.6 fL      MCH 31.1 pg      MCHC 32.9 (L) g/dL      RDW 13.1 %      RDW-SD 45.3 fl      MPV 10.0 fL      Platelets 360 10*3/mm3     Protime-INR [[id number]]  (Normal) Collected:  10/15/18 0436    Specimen:  Blood Updated:  10/15/18 0454     Protime 13.0 Seconds      INR 0.95    POC Glucose Once [[id number]]  (Abnormal) Collected:  10/15/18 0436    Specimen:  Blood Updated:  10/15/18 0448     Glucose 164 (H) mg/dL      Comment: : [id number] Hilary JamiMeter ID: [id number]           Patient Active Problem List   Diagnosis   • Seizure (CMS/HCC)       CT of head without contrast reviewed by me.  There are areas of encephalomalacia in the bilateral frontal regions consistent with known previous surgery.      Impression:    1.  New-onset seizure - Complex Partial Seizure with known cortical disruption from previous surgical site  2.  Acute kidney injury  3.  History of hypertension  4.  History of meningioma resection in 2015  5.  Tobacco use  Plan:    · EEG  · MR Brain with and without contrast  · IVF  · Seizure precautions  · Will start Depakote for now as Keppra made him sick previously.  Vimpat is a poor choice for financial reasons.  I would really like to load him with Depakote and discharge him on this with plans to move him to Tegretol as an outpatient.  · Tobacco cessation counseling  · Seizure precautoins as below    Recommended to observe all seizure precautions, including, but not limited to no driving until seizure free for more  than 3 months- as per State driving regulation / law;   Avoid all high-risk activity,   Take showers instead of baths,   Avoid swimming without observation,   Avoid open heat sources,   Avoid working at heights and   Avoid engaging in all potentially hazardous activities.   Patient expressed clear understanding      Moise Crawley MD  10/15/18  11:01 AM

## 2018-10-16 VITALS
HEART RATE: 83 BPM | HEIGHT: 74 IN | WEIGHT: 229.94 LBS | OXYGEN SATURATION: 96 % | TEMPERATURE: 98.3 F | RESPIRATION RATE: 20 BRPM | DIASTOLIC BLOOD PRESSURE: 70 MMHG | SYSTOLIC BLOOD PRESSURE: 130 MMHG | BODY MASS INDEX: 29.51 KG/M2

## 2018-10-16 LAB
ANION GAP SERPL CALCULATED.3IONS-SCNC: 8 MMOL/L (ref 4–13)
BASOPHILS # BLD AUTO: 0.02 10*3/MM3 (ref 0–0.2)
BASOPHILS NFR BLD AUTO: 0.3 % (ref 0–2)
BUN BLD-MCNC: 12 MG/DL (ref 5–21)
BUN/CREAT SERPL: 9.2 (ref 7–25)
CALCIUM SPEC-SCNC: 8.9 MG/DL (ref 8.4–10.4)
CHLORIDE SERPL-SCNC: 102 MMOL/L (ref 98–110)
CO2 SERPL-SCNC: 31 MMOL/L (ref 24–31)
CREAT BLD-MCNC: 1.3 MG/DL (ref 0.5–1.4)
DEPRECATED RDW RBC AUTO: 42.9 FL (ref 40–54)
EOSINOPHIL # BLD AUTO: 0.06 10*3/MM3 (ref 0–0.7)
EOSINOPHIL NFR BLD AUTO: 1 % (ref 0–4)
ERYTHROCYTE [DISTWIDTH] IN BLOOD BY AUTOMATED COUNT: 13.1 % (ref 12–15)
GFR SERPL CREATININE-BSD FRML MDRD: 73 ML/MIN/1.73
GLUCOSE BLD-MCNC: 92 MG/DL (ref 70–100)
HCT VFR BLD AUTO: 41.1 % (ref 40–52)
HGB BLD-MCNC: 14.2 G/DL (ref 14–18)
IMM GRANULOCYTES # BLD: 0.01 10*3/MM3 (ref 0–0.03)
IMM GRANULOCYTES NFR BLD: 0.2 % (ref 0–5)
LYMPHOCYTES # BLD AUTO: 2.36 10*3/MM3 (ref 0.72–4.86)
LYMPHOCYTES NFR BLD AUTO: 37.5 % (ref 15–45)
MCH RBC QN AUTO: 30.9 PG (ref 28–32)
MCHC RBC AUTO-ENTMCNC: 34.5 G/DL (ref 33–36)
MCV RBC AUTO: 89.5 FL (ref 82–95)
MONOCYTES # BLD AUTO: 0.45 10*3/MM3 (ref 0.19–1.3)
MONOCYTES NFR BLD AUTO: 7.2 % (ref 4–12)
NEUTROPHILS # BLD AUTO: 3.39 10*3/MM3 (ref 1.87–8.4)
NEUTROPHILS NFR BLD AUTO: 53.8 % (ref 39–78)
NRBC BLD MANUAL-RTO: 0 /100 WBC (ref 0–0)
PLATELET # BLD AUTO: 258 10*3/MM3 (ref 130–400)
PMV BLD AUTO: 9.6 FL (ref 6–12)
POTASSIUM BLD-SCNC: 4.2 MMOL/L (ref 3.5–5.3)
RBC # BLD AUTO: 4.59 10*6/MM3 (ref 4.8–5.9)
SODIUM BLD-SCNC: 141 MMOL/L (ref 135–145)
WBC NRBC COR # BLD: 6.29 10*3/MM3 (ref 4.8–10.8)

## 2018-10-16 PROCEDURE — G0378 HOSPITAL OBSERVATION PER HR: HCPCS

## 2018-10-16 PROCEDURE — 80048 BASIC METABOLIC PNL TOTAL CA: CPT | Performed by: PSYCHIATRY & NEUROLOGY

## 2018-10-16 PROCEDURE — 99217 PR OBSERVATION CARE DISCHARGE MANAGEMENT: CPT | Performed by: PSYCHIATRY & NEUROLOGY

## 2018-10-16 PROCEDURE — 85025 COMPLETE CBC W/AUTO DIFF WBC: CPT | Performed by: PSYCHIATRY & NEUROLOGY

## 2018-10-16 PROCEDURE — 94799 UNLISTED PULMONARY SVC/PX: CPT

## 2018-10-16 PROCEDURE — 94760 N-INVAS EAR/PLS OXIMETRY 1: CPT

## 2018-10-16 RX ORDER — DIVALPROEX SODIUM 500 MG/1
500 TABLET, DELAYED RELEASE ORAL 3 TIMES DAILY
Qty: 90 TABLET | Refills: 1 | Status: SHIPPED | OUTPATIENT
Start: 2018-10-16 | End: 2018-11-29

## 2018-10-16 RX ADMIN — HYDROCHLOROTHIAZIDE 25 MG: 25 TABLET ORAL at 08:43

## 2018-10-16 RX ADMIN — DIVALPROEX SODIUM 500 MG: 500 TABLET, DELAYED RELEASE ORAL at 06:18

## 2018-10-16 RX ADMIN — Medication 3 ML: at 09:00

## 2018-10-16 RX ADMIN — SODIUM CHLORIDE 100 ML/HR: 9 INJECTION, SOLUTION INTRAVENOUS at 00:36

## 2018-10-16 NOTE — PLAN OF CARE
Problem: Seizure Disorder/Epilepsy (Adult)  Goal: Signs and Symptoms of Listed Potential Problems Will be Absent, Minimized or Managed (Seizure Disorder/Epilepsy)  Outcome: Ongoing (interventions implemented as appropriate)      Problem: Patient Care Overview  Goal: Plan of Care Review  Outcome: Ongoing (interventions implemented as appropriate)   10/16/18 0018   Coping/Psychosocial   Plan of Care Reviewed With patient   Plan of Care Review   Progress improving   OTHER   Outcome Summary no seizure activity this shift. up ad michelle. c/o back soreness. tylenol given.      Goal: Individualization and Mutuality  Outcome: Ongoing (interventions implemented as appropriate)    Goal: Discharge Needs Assessment  Outcome: Ongoing (interventions implemented as appropriate)    Goal: Interprofessional Rounds/Family Conf  Outcome: Ongoing (interventions implemented as appropriate)

## 2018-10-16 NOTE — PAYOR COMM NOTE
"FROM: OVI BURROWS  PHONE: [phone]  FAX: [phone]    ID: [id number]    Chirag Mata  (43 y.o. Male)     Date of Birth Social Security Number Address Home Phone MRN    1975  [id number] [facility] [id number] [phone] [id number]    Hinduism Marital Status          Mandaen Single       Admission Date Admission Type Admitting Provider Attending Provider Department, Room/Bed    10/15/18 Emergency Moise Crawley MD [place], Joseph C, MD [facility] 3A, 347/1    Discharge Date Discharge Disposition Discharge Destination                       Attending Provider:  Moise Crawley MD    Allergies:  No Known Allergies    Isolation:  None   Infection:  None   Code Status:  CPR    Ht:  188 cm (74\")   Wt:  104 kg (229 lb 15 oz)    Admission Cmt:  None   Principal Problem:  None                Active Insurance as of 10/15/2018     Primary Coverage     Payor Plan Insurance Group Employer/Plan Group    [employer] [place] [employer] [place]      Payor Plan Address Payor Plan Phone Number Effective From Effective To    [address] [phone] 10/15/2018     [facility] [id number]       Subscriber Name Subscriber Birth Date Member ID       CHIRAG MATA 1975 [id number]                 Emergency Contacts      (Rel.) Home Phone Work Phone Mobile Phone    Carmen Walton (Significant Other) -- -- [phone]               History & Physical      Moise Crawley MD at 10/15/2018 11:01 AM          Neurology History & Physical    Indication for Admission:  Seizures    History of present illness:  This is a 43-year-old Afro-American male with a past history of bilateral frontal meningioma resections in 2015.  He reportedly took Keppra just after surgery and it made him sick.  He stopped it shortly afterwards.  He has had no issues until the past several days.  His girlfriend is currently at the bedside and assists in the history.  She tells me that for the past " several days he has had episodes of staring off in space and not being responsive.  He went to bed last night at 11 PM.  At 3 AM this morning she woke up and found him to have increased tone and rhythmic jerking of the left face, arm, and leg.  This lasted less than 15 seconds but occur approximately 6-10 times.  He had additional seizures in the ambulance on the way to the hospital and several in the hospital as well.  These were eventually stopped after multiple rounds of Versed and a load of fosphenytoin.  He did have tongue biting with this but no urinary incontinence.  He has no previous history of seizures.        Past Medical History:   Diagnosis Date   • Drug abuse (CMS/[facility])    • Hypertension        No Known Allergies  Prescriptions Prior to Admission   Medication Sig Dispense Refill Last Dose   • amitriptyline (ELAVIL) 50 MG tablet Take 50 mg by mouth Every Night.      • hydrochlorothiazide (HYDRODIURIL) 25 MG tablet Take 25 mg by mouth Daily.          Social History     Social History   • Marital status: Single     Spouse name: N/A   • Number of children: N/A   • Years of education: N/A     Occupational History   • Not on file.     Social History Main Topics   • Smoking status: Current Every Day Smoker     Packs/day: 1.00   • Smokeless tobacco: Not on file   • Alcohol use No   • Drug use: No   • Sexual activity: Not on file     Other Topics Concern   • Not on file     Social History Narrative   • No narrative on file   Patient lives with his girlfriend.  He is a  by [place].  He smokes one pack of cigarettes per day.  He denies significant alcohol use.  He denies drug abuse.  History reviewed. No pertinent family history.    Review of Systems  Review of Systems - a 14 point review of systems was performed.  He was positive for seizure activity as noted above.    Vital Signs   Temp:  [97.8 °F (36.6 °C)-98.4 °F (36.9 °C)] 98.4 °F (36.9 °C)  Heart Rate:  [] 98  Resp:  [16-20] 16  BP:  (109-124)/(52-82) 118/70    General Exam:  Head:  A healed craniotomy scar is visible across the scalp.  HEENT:  Neck supple  Fundoscopic Exam:  No signs of disc edema  CVS:  Regular rate and rhythm.  No murmurs  Carotid Examination:  No bruits  Lungs:  Clear to auscultation  Abdomen:  Non-tender, Non-distended  Extremities:  No signs of peripheral edema  Skin:  No rashes    Neurologic Exam:    Mental Status:    -Awake, Alert, Oriented X 3  -No word finding difficulties  -No aphasia  -No dysarthria  -Follows simple and complex commands    CN II:  Visual fields full.  Pupils equally reactive to light  CN III, IV, VI:  Extraocular Muscles full with no signs of nystagmus  CN V:  Facial sensory is symmetric with no asymmetries.  CN VII:  Facial motor symmetric  CN VIII:  Gross hearing intact bilaterally  CN IX:  Palate elevates symmetrically  CN X:  Palate elevates symmetrically  CN XI:  Shoulder shrug symmetric  CN XII:  Tongue is midline on protrusion    Motor: (strength out of 5:  1= minimal movement, 2 = movement in plane of gravity, 3 = movement against gravity, 4 = movement against some resistance, 5 = full strength)    -Right Upper Ext: Proximal: 5 Distal: 5  -Left Upper Ext: Proximal: 5 Distal: 5    -Right Lower Ext: Proximal: 5 Distal: 5  -Left Lower Ext: Proximal: 5 Distal: 5    DTR:  -Right   Bicep: 2+ Tricep: 2+ Brachoradialis: 2+   Patella: 2+ Ankle: 2+ Neg Babinski  -Left   Bicep: 2+ Tricep: 2+ Brachoradialis: 2+   Patella: 2+ Ankle: 2+ Neg Babinski    Sensory:  -Intact to light touch, pinprick, temperature, pain, and proprioception    Coordination:  -Finger to nose intact  -Heel to shin intact  -No ataxia    Gait  -No signs of ataxia  -ambulates unassisted      Results Review:  Lab Results (last 7 days)     Procedure Component Value Units Date/Time    Urine Drug Screen - Urine, Clean Catch [[id number]]  (Abnormal) Collected:  10/15/18 0525    Specimen:  Urine from Urine, Catheter Updated:  10/15/18 0605      Amphetamine Screen, Urine Negative     Barbiturates Screen, Urine Negative     Benzodiazepine Screen, Urine Positive (A)     Cocaine Screen, Urine Negative     Methadone Screen, Urine Negative     Opiate Screen Positive (A)     Phencyclidine (PCP), Urine Negative     THC, Screen, Urine Negative    Narrative:       Negative Thresholds For Drugs Screened in Urine:    Amphetamines          500 ng/ml  Barbiturates          200 ng/ml  Benzodiazepines       200 ng/ml  Cocaine               150 ng/ml  Methadone             150 ng/ml  Opiates               300 ng/ml  Phencyclidine         25 ng/ml  THC                      50 ng/ml    The normal value for all drugs tested is negative. This report includes final unconfirmed screening results.  A positive result by this assay can be, at your request, sent to the Reference Lab for confirmation by gas chromatography. Unconfirmed results must not be used for non-medical purposes, such as employment or legal testing. Clinical consideration should be applied to any drug of abuse test result, particularly when unconfirmed results are used.    Troponin [[id number]]  (Normal) Collected:  10/15/18 0520    Specimen:  Blood Updated:  10/15/18 0603     Troponin I <0.012 ng/mL      Comment: Specimen hemolyzed.  Results may be affected.       TSH [[id number]]  (Normal) Collected:  10/15/18 0436    Specimen:  Blood Updated:  10/15/18 0556     TSH 3.230 mIU/mL     Myoglobin, Serum [[id number]]  (Abnormal) Collected:  10/15/18 0520    Specimen:  Blood Updated:  10/15/18 0553     Myoglobin 447.5 (H) ng/mL     [place] Draw [[id number]] Collected:  10/15/18 0436    Specimen:  Blood Updated:  10/15/18 0546    Narrative:       The following orders were created for panel order [place] Draw.  Procedure                               Abnormality         Status                     ---------                               -----------         ------                     Light Blue Top[[id number]]                                    Final result               Green Top (Gel)[[id number]]                                  Final result               Lavender Top[[id number]]                                     Final result               Red Top[[id number]]                                          Final result                 Please view results for these tests on the individual orders.    Light Blue Top [[id number]] Collected:  10/15/18 0436    Specimen:  Blood Updated:  10/15/18 0546     Extra Tube hold for add-on     Comment: Auto resulted       Green Top (Gel) [[id number]] Collected:  10/15/18 0436    Specimen:  Blood Updated:  10/15/18 0546     Extra Tube Hold for add-ons.     Comment: Auto resulted.       Lavender Top [[id number]] Collected:  10/15/18 0436    Specimen:  Blood Updated:  10/15/18 0546     Extra Tube hold for add-on     Comment: Auto resulted       Red Top [[id number]] Collected:  10/15/18 0436    Specimen:  Blood Updated:  10/15/18 0546     Extra Tube Hold for add-ons.     Comment: Auto resulted.       Magnesium [[id number]]  (Normal) Collected:  10/15/18 0520    Specimen:  Blood Updated:  10/15/18 0545     Magnesium 2.2 mg/dL      Comment: Specimen hemolyzed.  Results may be affected.       Comprehensive Metabolic Panel [[id number]]  (Abnormal) Collected:  10/15/18 0520    Specimen:  Blood Updated:  10/15/18 0544     Glucose 144 (H) mg/dL      BUN 16 mg/dL      Creatinine 1.66 (H) mg/dL      Sodium 137 mmol/L      Potassium 3.9 mmol/L      Comment: Specimen hemolyzed.  Results may be affected.        Chloride 98 mmol/L      CO2 21.0 (L) mmol/L      Calcium 9.7 mg/dL      Total Protein 6.9 g/dL      Comment: Specimen hemolyzed.  Results may be affected.        Albumin 3.90 g/dL      Comment: Specimen hemolyzed. Results may be affected.        ALT (SGPT) 53 U/L      Comment: Specimen hemolyzed.  Results may be affected.        AST (SGOT) 43 U/L      Comment: Specimen hemolyzed.  Results may be affected.        Alkaline  Phosphatase 66 U/L      Comment: Specimen hemolyzed. Results may be affected.        Total Bilirubin 0.4 mg/dL      eGFR  African Amer 55 (L) mL/min/1.73      Globulin 3.0 gm/dL      A/G Ratio 1.3 g/dL      BUN/Creatinine Ratio 9.6     Anion Gap 18.0 (H) mmol/L     T4, Free [[id number]]  (Normal) Collected:  10/15/18 0436    Specimen:  Blood Updated:  10/15/18 0543     Free T4 0.95 ng/dL     CK [[id number]]  (Normal) Collected:  10/15/18 0520    Specimen:  Blood Updated:  10/15/18 0541     Creatine Kinase 165 U/L     CBC & Differential [[id number]] Collected:  10/15/18 0436    Specimen:  Blood Updated:  10/15/18 0511    Narrative:       The following orders were created for panel order CBC & Differential.  Procedure                               Abnormality         Status                     ---------                               -----------         ------                     Manual Differential[[id number]]          Abnormal            Final result               CBC Auto Differential[[id number]]        Abnormal            Final result                 Please view results for these tests on the individual orders.    Manual Differential [[id number]]  (Abnormal) Collected:  10/15/18 0436    Specimen:  Blood Updated:  10/15/18 0511     Neutrophil % 39.6 %      Lymphocyte % 29.2 %      Monocyte % 6.3 %      Eosinophil % 4.2 (H) %      Basophil % 2.1 (H) %      Metamyelocyte % 1.0 (H) %      Atypical Lymphocyte % 17.7 (H) %      Neutrophils Absolute 4.59 10*3/mm3      Lymphocytes Absolute 3.38 10*3/mm3      Monocytes Absolute 0.73 10*3/mm3      Eosinophils Absolute 0.49 10*3/mm3      Basophils Absolute 0.24 (H) 10*3/mm3      Poikilocytes Slight/1+     Smudge Cells Slight/1+     Clumped Platelets Present    CBC Auto Differential [[id number]]  (Abnormal) Collected:  10/15/18 0436    Specimen:  Blood Updated:  10/15/18 0511     WBC 11.59 (H) 10*3/mm3      RBC 5.01 10*6/mm3      Hemoglobin 15.6 g/dL      Hematocrit 47.4 %      MCV  94.6 fL      MCH 31.1 pg      MCHC 32.9 (L) g/dL      RDW 13.1 %      RDW-SD 45.3 fl      MPV 10.0 fL      Platelets 360 10*3/mm3     Protime-INR [[id number]]  (Normal) Collected:  10/15/18 0436    Specimen:  Blood Updated:  10/15/18 0454     Protime 13.0 Seconds      INR 0.95    POC Glucose Once [[id number]]  (Abnormal) Collected:  10/15/18 0436    Specimen:  Blood Updated:  10/15/18 0448     Glucose 164 (H) mg/dL      Comment: : [id number] Hilary AparicioiMeter ID: [id number]           Patient Active Problem List   Diagnosis   • Seizure (CMS/HCC)       CT of head without contrast reviewed by me.  There are areas of encephalomalacia in the bilateral frontal regions consistent with known previous surgery.      Impression:    1.  New-onset seizure - Complex Partial Seizure with known cortical disruption from previous surgical site  2.  Acute kidney injury  3.  History of hypertension  4.  History of meningioma resection in 2015  5.  Tobacco use  Plan:    · EEG  · MR Brain with and without contrast  · IVF  · Seizure precautions  · Will start Depakote for now as Keppra made him sick previously.  Vimpat is a poor choice for financial reasons.  I would really like to load him with Depakote and discharge him on this with plans to move him to Tegretol as an outpatient.  · Tobacco cessation counseling  · Seizure precautoins as below    Recommended to observe all seizure precautions, including, but not limited to no driving until seizure free for more than 3 months- as per State driving regulation / law;   Avoid all high-risk activity,   Take showers instead of baths,   Avoid swimming without observation,   Avoid open heat sources,   Avoid working at heights and   Avoid engaging in all potentially hazardous activities.   Patient expressed clear understanding      Moise Crawley MD  10/15/18  11:01 AM      Electronically signed by Moise Crawley MD at 10/15/2018 11:21 AM          Emergency Department Notes      Kevon  "Ronnie LONGDO at 10/15/2018  4:33 AM          Subjective   This is a 43-year-old male who presents to the emergency department via EMS for evaluation after witnessed seizure-like activity.  Patient does not have a known history of seizures.  In 2015, in [place], patient had a meningioma removed.  He has been on amitriptyline since.  For the last 1 week patient has had worsening right-sided headaches with a sensation of pressure in the right forehead and behind the right eye.  No loss of vision.  No drooping of the face.  No other neurologic changes.  His significant other woke up to some mild shaking.  She indicates that this lasted only several seconds and he talked to her shortly afterwards.  He then started to have diffuse and \"more severe\" rhythmic shaking which lasted 30-40 seconds.  After this he was slightly disoriented.  He then had several more seizure events without return of consciousness.  EMS providers were on scene and noted 5 additional seizure events without return of consciousness.  A total of 5mg of Versed was given.  They noted that his left face and left extremities were contracted more so than the right.  No fall.  No recent head injury.  No recent fever.  No recent complaints of chest or abdominal discomfort.  No recent vomiting.  No recent difficulty breathing.  Patient appears to be postictal at this time and is unable to provide additional history or participate in a review of systems.            Review of Systems   Unable to perform ROS: Mental status change       Past Medical History:   Diagnosis Date   • Drug abuse (CMS/HCC)    • Hypertension        No Known Allergies    Past Surgical History:   Procedure Laterality Date   • TESTICLE TORSION REPAIR         History reviewed. No pertinent family history.    Social History     Social History   • Marital status: Single     Social History Main Topics   • Smoking status: Current Every Day Smoker     Packs/day: 1.00   • Alcohol use No   • Drug " use: No     Other Topics Concern   • Not on file           Objective   Physical Exam   Constitutional: He appears well-developed and well-nourished.   Well-built -American male.   HENT:   Head: Normocephalic and atraumatic.   Eyes: Pupils are equal, round, and reactive to light. EOM are normal.   Neck: Normal range of motion. Neck supple. No JVD present. No tracheal deviation present.   Cardiovascular: Regular rhythm and normal heart sounds.    Tachycardic   Pulmonary/Chest: Effort normal and breath sounds normal. No respiratory distress. He has no wheezes. He has no rales. He exhibits no tenderness.   Abdominal: Soft. Bowel sounds are normal. There is no tenderness. There is no guarding.   Musculoskeletal: He exhibits no edema or deformity.   Neurological:   He is somnolent but arousable.  He does appear to attempt to answer questions.  He does attempt to follow commands.  Patient has weak but present  strength on the right.  Does follow commands with RLE.  Patient does not follow commands with the left side.  He does appear to have a mild left facial droop.  Does not answer orientation questions.  Additional neurologic exam is unable to be obtained.   Skin: Skin is warm and dry. Capillary refill takes less than 2 seconds.   Psychiatric: He has a normal mood and affect. His behavior is normal.   Nursing note and vitals reviewed.      Procedures          ED Course      Patient seen immediately upon arrival    EKG at 4:34 AM shows sinus tachycardia with a rate of 139 bpm.  Intervals within normal limits.  Right axis deviation.  Poor R-wave progression.  T-wave inversions in lead 3.  No ST elevation or evidence for acute infarction.    Patient sent for CT imaging    1500 mg of fosphenytoin ordered    Labs reviewed    CT scans interpreted by the radiologist    CT head:  No acute infarction or hemorrhage   Chronic bilateral inferior frontal lobe infarctions    CTA head:  Negative    Labs Reviewed    COMPREHENSIVE METABOLIC PANEL - Abnormal; Notable for the following:        Result Value    Glucose 144 (*)     Creatinine 1.66 (*)     CO2 21.0 (*)     eGFR   Amer 55 (*)     Anion Gap 18.0 (*)     All other components within normal limits   CBC WITH AUTO DIFFERENTIAL - Abnormal; Notable for the following:     WBC 11.59 (*)     MCHC 32.9 (*)     All other components within normal limits   URINE DRUG SCREEN - Abnormal; Notable for the following:     Benzodiazepine Screen, Urine Positive (*)     Opiate Screen Positive (*)     All other components within normal limits    Narrative:     Negative Thresholds For Drugs Screened in Urine:    Amphetamines          500 ng/ml  Barbiturates          200 ng/ml  Benzodiazepines       200 ng/ml  Cocaine               150 ng/ml  Methadone             150 ng/ml  Opiates               300 ng/ml  Phencyclidine         25 ng/ml  THC                      50 ng/ml    The normal value for all drugs tested is negative. This report includes final unconfirmed screening results.  A positive result by this assay can be, at your request, sent to the Reference Lab for confirmation by gas chromatography. Unconfirmed results must not be used for non-medical purposes, such as employment or legal testing. Clinical consideration should be applied to any drug of abuse test result, particularly when unconfirmed results are used.   MYOGLOBIN, SERUM - Abnormal; Notable for the following:     Myoglobin 447.5 (*)     All other components within normal limits   POCT GLUCOSE FINGERSTICK - Abnormal; Notable for the following:     Glucose 164 (*)     All other components within normal limits   MANUAL DIFFERENTIAL - Abnormal; Notable for the following:     Eosinophil % 4.2 (*)     Basophil % 2.1 (*)     Metamyelocyte % 1.0 (*)     Atypical Lymphocyte % 17.7 (*)     Basophils Absolute 0.24 (*)     All other components within normal limits   PROTIME-INR - Normal   CK - Normal   TSH - Normal   MAGNESIUM  - Normal   T4, FREE - Normal   TROPONIN (IN-HOUSE) - Normal   RAINBOW DRAW    Narrative:     The following orders were created for panel order [place] Draw.  Procedure                               Abnormality         Status                     ---------                               -----------         ------                     Light Blue Top[[id number]]                                   Final result               Green Top (Gel)[[id number]]                                  Final result               Lavender Top[[id number]]                                     Final result               Red Top[[id number]]                                          Final result                 Please view results for these tests on the individual orders.   POCT GLUCOSE FINGERSTICK   CBC AND DIFFERENTIAL    Narrative:     The following orders were created for panel order CBC & Differential.  Procedure                               Abnormality         Status                     ---------                               -----------         ------                     Manual Differential[[id number]]          Abnormal            Final result               CBC Auto Differential[[id number]]        Abnormal            Final result                 Please view results for these tests on the individual orders.   LIGHT BLUE TOP   GREEN TOP   LAVENDER TOP   RED TOP     X-ray chest as interpreted by myself shows no focal pulmonary consolidation    Patient and significant other updated on all results  His mentation is slightly improved  Improving neuro findings on the left  Heart rate in 80s-90s  BP stable    Dr. Crawley consulted and accepts admission   No further orders            MDM  Number of Diagnoses or Management Options  Seizure (CMS/HCC): new and requires workup     Amount and/or Complexity of Data Reviewed  Clinical lab tests: reviewed and ordered  Tests in the radiology section of CPT®:  reviewed and ordered  Tests in the medicine section of  CPT®:  ordered and reviewed  Decide to obtain previous medical records or to obtain history from someone other than the patient: yes  Obtain history from someone other than the patient: yes  Review and summarize past medical records: yes  Discuss the patient with other providers: yes  Independent visualization of images, tracings, or specimens: yes    Risk of Complications, Morbidity, and/or Mortality  Presenting problems: high  Diagnostic procedures: high  Management options: high    Critical Care  Total time providing critical care: 30-74 minutes    Patient Progress  Patient progress: improved     A stroke alert was not called despite his left-sided deficits given his seizure activity  He is not a tPA candidate and 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 Crawley who accepts admission to his service  Request a 3A bed  No further orders at this time          Final diagnoses:   Seizure (CMS/[facility])            Ronnie Lund DO  10/15/18 0628      Electronically signed by Ronnie Lund DO at 10/15/2018  6:28 AM     Kemal Wasserman, RN at 10/15/2018  4:35 AM        SEIZURE PRECAUTIONS INITIATED (PADDED RAILING, SIDE RAILS RAISED, FAMILY AT BEDSIDE, AND CALL LIGHT WITHIN REACH .     Kemal Wasserman RN  10/15/18 0648      Electronically signed by Kemal Wasserman, RN at 10/15/2018  6:48 AM             ICU Vital Signs     Row Name 10/16/18 0715 10/16/18 0405 10/16/18 0010 10/15/18 2115 10/15/18 2000       Height and Weight    Weight  --  --  --  -- 104 kg (229 lb 15 oz)    Weight Method  --  --  --  -- Bed scale       Vitals    Temp 98 °F (36.7 °C) 98.4 °F (36.9 °C) 98.5 °F (36.9 °C)  -- 98.9 °F (37.2 °C)    Temp src Oral Oral Oral  -- Oral    Pulse 88 85 88  -- 94    Heart Rate Source Monitor  "Monitor Monitor  -- Monitor    Resp 18 20 20  -- 20    Resp Rate Source Visual Visual Visual  -- Visual    /68 112/69 112/69  -- 108/66    BP Location Right arm Right arm Right arm  -- Right arm    BP Method Automatic Automatic Automatic  -- Automatic    Patient Position Lying Lying Lying  -- Lying       Oxygen Therapy    SpO2 97 % 97 % 97 % 97 % 97 %    Pulse Oximetry Type  -- Intermittent Intermittent Intermittent Intermittent    Device (Oxygen Therapy) room air room air room air room air room air    Row Name 10/15/18 1631 10/15/18 1230 10/15/18 1111 10/15/18 1024 10/15/18 0954       Height and Weight    Height  --  --  --  -- 188 cm (74.02\")    Weight  --  --  --  -- 106 kg (233 lb 11 oz)    Ideal Body Weight (IBW) (kg)  --  --  --  -- 87.7    BSA (Calculated - sq m)  --  --  --  -- 2.32 sq meters    BMI (Calculated)  --  --  --  -- 30    Weight in (lb) to have BMI = 25  --  --  --  -- 194.4       Vitals    Temp 98 °F (36.7 °C) 97.8 °F (36.6 °C) --   gone for test  --  --    Temp src Oral Oral  --  --  --    Pulse 83 92  -- 98  --    Heart Rate Source Monitor Monitor  -- Monitor  --    Resp 18 20  --  --  --    Resp Rate Source Visual Visual  --  --  --    /84 139/79  --  --  --    BP Location Right arm Right arm  --  --  --    BP Method Automatic Automatic  --  --  --    Patient Position Lying Sitting  --  --  --       Oxygen Therapy    SpO2 97 % 95 %  -- 98 %  --    Pulse Oximetry Type Intermittent  --  -- Intermittent  --    Device (Oxygen Therapy) room air room air  -- room air  --    Row Name 10/15/18 0800 10/15/18 0745 10/15/18 0649 10/15/18 06:22:14 10/15/18 0616       Height and Weight    Height  --  -- 188 cm (74\")  --  --    Weight  --  -- 106 kg (234 lb 9 oz)  --  --    Weight Method  --  -- Bed scale  --  --    Ideal Body Weight (IBW) (kg)  --  -- 87.66  --  --    BSA (Calculated - sq m)  --  -- 2.33 sq meters  --  --    BMI (Calculated)  --  -- 30.1  --  --    Weight in (lb) to have BMI = " "25  --  -- 194.3  --  --       Vitals    Temp  -- 98.4 °F (36.9 °C) 97.8 °F (36.6 °C) 98.3 °F (36.8 °C)  --    Temp src  -- Oral Oral Axillary  --    Core (Body) Temperature  --  --  --  -- 98.3 °F (36.8 °C)   AXILLARY    Pulse  -- 81 84  -- 85    Heart Rate Source  -- Monitor Monitor  --  --    Resp  -- 16 16  -- 16    Resp Rate Source  --  -- Visual  --  --    BP  -- 118/70 114/81  -- 117/82    Noninvasive MAP (mmHg)  --  --  --  -- 89    BP Location  -- Right arm Right arm  --  --    BP Method  -- Automatic Automatic  --  --    Patient Position  -- Lying Lying  --  --       Oxygen Therapy    SpO2  -- 96 % 94 %  -- 96 %    Pulse Oximetry Type  --  -- Intermittent  --  --    Device (Oxygen Therapy) room air room air room air  --  --    Row Name 10/15/18 0601 10/15/18 0546 10/15/18 0531 10/15/18 0512 10/15/18 0444       Height and Weight    Weight  --  --  --  -- 110 kg (243 lb)       Vitals    Pulse 90 97 105 115  --    Resp 16 16 20 20  --    /75 110/78 124/73 115/68  --    Noninvasive MAP (mmHg) 83 84 86 79  --       Oxygen Therapy    SpO2 96 % 94 % 97 % 95 %  --    Row Name 10/15/18 0433 10/15/18 0432                Height and Weight    Height 188 cm (74\")  --       Height Method Stated  --       Weight 64.4 kg (142 lb)  --       Weight Method Bed scale  --       Ideal Body Weight (IBW) (kg) 87.66  --       BSA (Calculated - sq m) 1.88 sq meters  --       BMI (Calculated) 18.2  --       Weight in (lb) to have BMI = 25 194.3  --          Vitals    Temp 97.9 °F (36.6 °C)  --       Temp src Temporal Artery   --       Pulse (!)  142 (!)  140       Heart Rate Source Monitor  --       Resp 20  --       Resp Rate Source Visual  --       /52 110/61       Noninvasive MAP (mmHg)  -- 71       BP Location Left arm  --       BP Method Automatic  --       Patient Position Sitting  --          Oxygen Therapy    SpO2 95 % 96 %       Pulse Oximetry Type Continuous  --       Device (Oxygen Therapy) room air  --       "     Hospital Medications (all)       Dose Frequency Start End    acetaminophen (TYLENOL) tablet 650 mg 650 mg Every 4 Hours PRN 10/15/2018     Sig - Route: Take 2 tablets by mouth Every 4 (Four) Hours As Needed for Mild Pain . - Oral    amitriptyline (ELAVIL) tablet 50 mg 50 mg Nightly 10/15/2018     Sig - Route: Take 2 tablets by mouth Every Night. - Oral    divalproex (DEPAKOTE) DR tablet 1,000 mg 1,000 mg Once 10/15/2018 10/15/2018    Sig - Route: Take 2 tablets by mouth 1 (One) Time. - Oral    divalproex (DEPAKOTE) DR tablet 500 mg 500 mg Every 8 Hours Scheduled 10/15/2018     Sig - Route: Take 1 tablet by mouth Every 8 (Eight) Hours. - Oral    fosphenytoin (Cerebyx) 1,500 mg PE in sodium chloride 0.9 % 100 mL IVPB 1,500 mg PE Once 10/15/2018 10/15/2018    Sig - Route: Infuse 1,500 mg PE into a venous catheter 1 (One) Time. - Intravenous    gadobenate dimeglumine (MULTIHANCE) injection 20 mL 20 mL Once in Imaging 10/15/2018 10/15/2018    Sig - Route: Infuse 20 mL into a venous catheter Once. - Intravenous    hydrochlorothiazide (HYDRODIURIL) tablet 25 mg 25 mg Daily 10/15/2018     Sig - Route: Take 1 tablet by mouth Daily. - Oral    iopamidol (ISOVUE-370) 76 % injection 100 mL 100 mL Once in Imaging 10/15/2018 10/15/2018    Sig - Route: Infuse 100 mL into a venous catheter Once. - Intravenous    lidocaine-Maalox-diphenhydramine (MIRACLE MOUTHWASH) oral suspension 30 mL 30 mL 4 Times Daily PRN 10/15/2018     Sig - Route: Swish and swallow 30 mL 4 (Four) Times a Day As Needed (sore mouth). - Swish & Swallow    LORazepam (ATIVAN) injection 2 mg 2 mg Every 4 Hours PRN 10/15/2018 10/25/2018    Sig - Route: Infuse 1 mL into a venous catheter Every 4 (Four) Hours As Needed for Seizures. - Intravenous    sodium chloride 0.9 % flush 10 mL 10 mL As Needed 10/15/2018     Sig - Route: Infuse 10 mL into a venous catheter As Needed for Line Care. - Intravenous    sodium chloride 0.9 % flush 3 mL 3 mL Every 12 Hours Scheduled  10/15/2018     Sig - Route: Infuse 3 mL into a venous catheter Every 12 (Twelve) Hours. - Intravenous    sodium chloride 0.9 % flush 3-10 mL 3-10 mL As Needed 10/15/2018     Sig - Route: Infuse 3-10 mL into a venous catheter As Needed for Line Care. - Intravenous    sodium chloride 0.9 % infusion 100 mL/hr Continuous 10/15/2018     Sig - Route: Infuse 100 mL/hr into a venous catheter Continuous. - Intravenous          Lab Results (last 72 hours)     Procedure Component Value Units Date/Time    Basic Metabolic Panel [[id number]]  (Normal) Collected:  10/16/18 0406    Specimen:  Blood Updated:  10/16/18 0455     Glucose 92 mg/dL      BUN 12 mg/dL      Creatinine 1.30 mg/dL      Sodium 141 mmol/L      Potassium 4.2 mmol/L      Chloride 102 mmol/L      CO2 31.0 mmol/L      Calcium 8.9 mg/dL      eGFR  African Amer 73 mL/min/1.73      BUN/Creatinine Ratio 9.2     Anion Gap 8.0 mmol/L     Narrative:       GFR Normal >60  Chronic Kidney Disease <60  Kidney Failure <15    CBC & Differential [[id number]] Collected:  10/16/18 0406    Specimen:  Blood Updated:  10/16/18 0431    Narrative:       The following orders were created for panel order CBC & Differential.  Procedure                               Abnormality         Status                     ---------                               -----------         ------                     CBC Auto Differential[[id number]]        Abnormal            Final result                 Please view results for these tests on the individual orders.    CBC Auto Differential [[id number]]  (Abnormal) Collected:  10/16/18 0406    Specimen:  Blood Updated:  10/16/18 0431     WBC 6.29 10*3/mm3      RBC 4.59 (L) 10*6/mm3      Hemoglobin 14.2 g/dL      Hematocrit 41.1 %      MCV 89.5 fL      MCH 30.9 pg      MCHC 34.5 g/dL      RDW 13.1 %      RDW-SD 42.9 fl      MPV 9.6 fL      Platelets 258 10*3/mm3      Neutrophil % 53.8 %      Lymphocyte % 37.5 %      Monocyte % 7.2 %      Eosinophil % 1.0 %       Basophil % 0.3 %      Immature Grans % 0.2 %      Neutrophils, Absolute 3.39 10*3/mm3      Lymphocytes, Absolute 2.36 10*3/mm3      Monocytes, Absolute 0.45 10*3/mm3      Eosinophils, Absolute 0.06 10*3/mm3      Basophils, Absolute 0.02 10*3/mm3      Immature Grans, Absolute 0.01 10*3/mm3      nRBC 0.0 /100 WBC     Urine Drug Screen - Urine, Clean Catch [[id number]]  (Abnormal) Collected:  10/15/18 0525    Specimen:  Urine from Urine, Catheter Updated:  10/15/18 0605     Amphetamine Screen, Urine Negative     Barbiturates Screen, Urine Negative     Benzodiazepine Screen, Urine Positive (A)     Cocaine Screen, Urine Negative     Methadone Screen, Urine Negative     Opiate Screen Positive (A)     Phencyclidine (PCP), Urine Negative     THC, Screen, Urine Negative    Narrative:       Negative Thresholds For Drugs Screened in Urine:    Amphetamines          500 ng/ml  Barbiturates          200 ng/ml  Benzodiazepines       200 ng/ml  Cocaine               150 ng/ml  Methadone             150 ng/ml  Opiates               300 ng/ml  Phencyclidine         25 ng/ml  THC                      50 ng/ml    The normal value for all drugs tested is negative. This report includes final unconfirmed screening results.  A positive result by this assay can be, at your request, sent to the Reference Lab for confirmation by gas chromatography. Unconfirmed results must not be used for non-medical purposes, such as employment or legal testing. Clinical consideration should be applied to any drug of abuse test result, particularly when unconfirmed results are used.    Troponin [[id number]]  (Normal) Collected:  10/15/18 0520    Specimen:  Blood Updated:  10/15/18 0603     Troponin I <0.012 ng/mL      Comment: Specimen hemolyzed.  Results may be affected.       TSH [[id number]]  (Normal) Collected:  10/15/18 0436    Specimen:  Blood Updated:  10/15/18 0556     TSH 3.230 mIU/mL     Myoglobin, Serum [[id number]]  (Abnormal) Collected:  10/15/18  0520    Specimen:  Blood Updated:  10/15/18 0553     Myoglobin 447.5 (H) ng/mL     [place] Draw [[id number]] Collected:  10/15/18 0436    Specimen:  Blood Updated:  10/15/18 0546    Narrative:       The following orders were created for panel order [place] Draw.  Procedure                               Abnormality         Status                     ---------                               -----------         ------                     Light Blue Top[[id number]]                                   Final result               Green Top (Gel)[[id number]]                                  Final result               Lavender Top[[id number]]                                     Final result               Red Top[[id number]]                                          Final result                 Please view results for these tests on the individual orders.    Light Blue Top [[id number]] Collected:  10/15/18 0436    Specimen:  Blood Updated:  10/15/18 0546     Extra Tube hold for add-on     Comment: Auto resulted       Green Top (Gel) [[id number]] Collected:  10/15/18 0436    Specimen:  Blood Updated:  10/15/18 0546     Extra Tube Hold for add-ons.     Comment: Auto resulted.       Lavender Top [[id number]] Collected:  10/15/18 0436    Specimen:  Blood Updated:  10/15/18 0546     Extra Tube hold for add-on     Comment: Auto resulted       Red Top [[id number]] Collected:  10/15/18 0436    Specimen:  Blood Updated:  10/15/18 0546     Extra Tube Hold for add-ons.     Comment: Auto resulted.       Magnesium [[id number]]  (Normal) Collected:  10/15/18 0520    Specimen:  Blood Updated:  10/15/18 0545     Magnesium 2.2 mg/dL      Comment: Specimen hemolyzed.  Results may be affected.       Comprehensive Metabolic Panel [[id number]]  (Abnormal) Collected:  10/15/18 0520    Specimen:  Blood Updated:  10/15/18 0544     Glucose 144 (H) mg/dL      BUN 16 mg/dL      Creatinine 1.66 (H) mg/dL      Sodium 137 mmol/L      Potassium 3.9 mmol/L      Comment:  Specimen hemolyzed.  Results may be affected.        Chloride 98 mmol/L      CO2 21.0 (L) mmol/L      Calcium 9.7 mg/dL      Total Protein 6.9 g/dL      Comment: Specimen hemolyzed.  Results may be affected.        Albumin 3.90 g/dL      Comment: Specimen hemolyzed. Results may be affected.        ALT (SGPT) 53 U/L      Comment: Specimen hemolyzed.  Results may be affected.        AST (SGOT) 43 U/L      Comment: Specimen hemolyzed.  Results may be affected.        Alkaline Phosphatase 66 U/L      Comment: Specimen hemolyzed. Results may be affected.        Total Bilirubin 0.4 mg/dL      eGFR  African Amer 55 (L) mL/min/1.73      Globulin 3.0 gm/dL      A/G Ratio 1.3 g/dL      BUN/Creatinine Ratio 9.6     Anion Gap 18.0 (H) mmol/L     T4, Free [[id number]]  (Normal) Collected:  10/15/18 0436    Specimen:  Blood Updated:  10/15/18 0543     Free T4 0.95 ng/dL     CK [[id number]]  (Normal) Collected:  10/15/18 0520    Specimen:  Blood Updated:  10/15/18 0541     Creatine Kinase 165 U/L     CBC & Differential [[id number]] Collected:  10/15/18 0436    Specimen:  Blood Updated:  10/15/18 0511    Narrative:       The following orders were created for panel order CBC & Differential.  Procedure                               Abnormality         Status                     ---------                               -----------         ------                     Manual Differential[[id number]]          Abnormal            Final result               CBC Auto Differential[[id number]]        Abnormal            Final result                 Please view results for these tests on the individual orders.    Manual Differential [[id number]]  (Abnormal) Collected:  10/15/18 0436    Specimen:  Blood Updated:  10/15/18 0511     Neutrophil % 39.6 %      Lymphocyte % 29.2 %      Monocyte % 6.3 %      Eosinophil % 4.2 (H) %      Basophil % 2.1 (H) %      Metamyelocyte % 1.0 (H) %      Atypical Lymphocyte % 17.7 (H) %      Neutrophils Absolute 4.59  10*3/mm3      Lymphocytes Absolute 3.38 10*3/mm3      Monocytes Absolute 0.73 10*3/mm3      Eosinophils Absolute 0.49 10*3/mm3      Basophils Absolute 0.24 (H) 10*3/mm3      Poikilocytes Slight/1+     Smudge Cells Slight/1+     Clumped Platelets Present    CBC Auto Differential [[id number]]  (Abnormal) Collected:  10/15/18 0436    Specimen:  Blood Updated:  10/15/18 0511     WBC 11.59 (H) 10*3/mm3      RBC 5.01 10*6/mm3      Hemoglobin 15.6 g/dL      Hematocrit 47.4 %      MCV 94.6 fL      MCH 31.1 pg      MCHC 32.9 (L) g/dL      RDW 13.1 %      RDW-SD 45.3 fl      MPV 10.0 fL      Platelets 360 10*3/mm3     Protime-INR [[id number]]  (Normal) Collected:  10/15/18 0436    Specimen:  Blood Updated:  10/15/18 0454     Protime 13.0 Seconds      INR 0.95    POC Glucose Once [[id number]]  (Abnormal) Collected:  10/15/18 0436    Specimen:  Blood Updated:  10/15/18 0448     Glucose 164 (H) mg/dL      Comment: : [id number] Hilary JamiMeter ID: [id number]             Imaging Results (last 72 hours)     Procedure Component Value Units Date/Time    MRI Brain With & Without Contrast [[id number]] Collected:  10/15/18 1733     Updated:  10/15/18 1836    Narrative:       EXAM: MR BRAIN WITHOUT AND WITH IV CONTRAST 10/15/2018     COMPARISON: Head CT dated earlier today      HISTORY: 43 years-old Male. Seizures new or progressive;  R56.9-Unspecified convulsions     TECHNIQUE:   Routine pulse sequences were obtained of the brain before and after the  administration of IV contrast.      REPORT:      Motion degraded examination.      There is no diffusion restriction to suggest acute infarct. No midline  shift. No midline shift.     Severe encephalomalacia and gliosis in the involving the bifrontal  lobes. No discrete enhancing soft tissue signal within the surgical  cavity. The hippocampal formations are symmetric. No suspicious  enhancement within the brain parenchyma. No acute intracranial  hemorrhage. Mild chronic microvascular  changes.     Craniotomy defects, bifrontal.     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 Associated enhancement at that location is  probably postsurgical in nature.  Paranasal sinus mucosal disease also present.             Impression:       1.  No acute abnormalities.  2.  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  3.  Severely and cephalization gliosis in the bifrontal lobes.  This report was finalized on 10/15/2018 18:32 by Dr. Damaris Valencia MD.    CT Angiogram Head With & Without Contrast [[id number]] Collected:  10/15/18 0723     Updated:  10/15/18 1203    Narrative:       EXAMINATION:  CT ANGIOGRAM HEAD W WO CONTRAST-  10/15/2018 4:57 AM CDT     HISTORY: seizure; left paralysis      COMPARISON: Syncope     TECHNIQUE: CT angiography head was performed.     Radiation dose equals DLP blank mGy-cm.  Automated exposure control dose  reduction technique was implemented.     Thin section axial imaging was obtained with intravenous contrast. 2-D  sagittal and coronal reconstruction images were generated. Maximum  intensity projection angiographic imaging was obtained in multiple  projections.     Examination was sent to [facility] for preliminary interpretation.        FINDINGS:      The distal vertebral arteries are patent. The basilar artery imaged  appropriately.     The distal internal carotid arteries are intact without focal  steno-occlusive change.     The vessels comprising the [place] are imaged without aneurysm.     The anterior cerebral arteries are imaged symmetrically.     The middle cerebral arteries are also imaged symmetrically.     The posterior cerebral arteries are observed without focal  steno-occlusive changes.     Distally the basilar structures are observed appropriately without  angiographic  abnormality.     The dural venous sinuses enhance without thrombus.       Impression:       1. Negative CT angiography of the head.  This report was finalized on 10/15/2018 12:00 by Dr. Nabil Blanchard MD.    XR Chest 1 View [[id number]] Collected:  10/15/18 0723     Updated:  10/15/18 0727    Narrative:       EXAMINATION: XR CHEST 1 VW-. 10/15/2018 7:23 AM CDT     CHEST, ONE VIEW:     HISTORY: Aspiration     COMPARISON: None     A single frontal chest radiograph was obtained.     FINDINGS:     Level inspiration is shallow and lung volumes diminished.     The lungs are clear however without consolidating infiltrates.     The heart is normal in size, pulmonary circulation appropriate, without  heart failure.     The bony structures are intact.                                     Impression:       1. Shallow inspiration with diminished lung volumes.  2. No definite acute cardiopulmonary process.     This report was finalized on 10/15/2018 07:24 by Dr. Nabil Blanchard MD.    CT Head Without Contrast [[id number]] Collected:  10/15/18 0706     Updated:  10/15/18 0711    Narrative:       EXAMINATION: CT HEAD WO CONTRAST-  10/15/2018 7:02 AM CDT     CT SCAN OF THE HEAD, WITHOUT CONTRAST:      HISTORY: Syncope, left-sided paralysis     COMPARISONS: None      TECHNIQUE:     Radiation dose equals  mGy-cm.  Automated exposure control dose  reduction technique was implemented.        CT evaluation of the head without intravenous contrast. 5 mm transaxial  images were obtained.   2-D sagittal and coronal reconstruction images  were generated.     FINDINGS: Examination was sent to [facility] for preliminary  interpretation.     There are craniotomy changes identified in the frontal lobes with  calvarial defect with associated plating.     There are low-attenuation changes involving the cortex and deep white  matter of the frontal lobes, particularly inferiorly, compatible with  encephalomalacia with differential considerations  include changes from  old trauma as well as infarction.     There is no intra or extra-axial hemorrhage     There is no mass effect or midline shift.     There is no hydrocephalus.     There is opacification of the sphenoid sinuses. Partial opacification  the ethmoid sinuses identified.     No acute osseous abnormalities observed.       Impression:       1. No CT evidence of an acute intracranial process.       2. Changes from frontal craniotomy with bilateral inferior frontal lobe  encephalomalacia change.  3. Paranasal sinus disease.                 This report was finalized on 10/15/2018 07:08 by Dr. Nabil Blanchard MD.          ECG/EMG Results (last 72 hours)     Procedure Component Value Units Date/Time    ECG 12 Lead [[id number]] Collected:  10/15/18 0434     Updated:  10/15/18 1238    Narrative:       Test Reason : SEIZURE  Blood Pressure : **/** mmHG  Vent. Rate : 139 BPM     Atrial Rate : 139 BPM     P-R Int : 130 ms          QRS Dur : 092 ms      QT Int : 298 ms       P-R-T Axes : 042 133 017 degrees     QTc Int : 453 ms    Sinus tachycardia  Right axis deviation  Abnormal ECG  No previous ECGs available    Referred By:  TOMI           Confirmed By:Chirag Bhakta MD          Orders (last 72 hrs)     Start     Ordered    10/16/18 0600  CBC Auto Differential  PROCEDURE ONCE      10/16/18 0001    10/15/18 2100  amitriptyline (ELAVIL) tablet 50 mg  Nightly      10/15/18 0846    10/15/18 1400  divalproex (DEPAKOTE) DR tablet 500 mg  Every 8 Hours Scheduled      10/15/18 1124    10/15/18 1301  lidocaine-Maalox-diphenhydramine (MIRACLE MOUTHWASH) oral suspension 30 mL  4 Times Daily PRN      10/15/18 1302    10/15/18 1300  gadobenate dimeglumine (MULTIHANCE) injection 20 mL  Once in Imaging      10/15/18 1203    10/15/18 1215  divalproex (DEPAKOTE) DR tablet 1,000 mg  Once      10/15/18 1125    10/15/18 1200  Vital Signs  Every 4 Hours      10/15/18 0846    10/15/18 0945  hydrochlorothiazide (HYDRODIURIL) tablet  25 mg  Daily      10/15/18 0846    10/15/18 0945  sodium chloride 0.9 % flush 3 mL  Every 12 Hours Scheduled      10/15/18 0846    10/15/18 0945  sodium chloride 0.9 % infusion  Continuous      10/15/18 0846    10/15/18 0847  Basic Metabolic Panel  Daily      10/15/18 0846    10/15/18 0847  CBC & Differential  Daily      10/15/18 0846    10/15/18 0847  EEG  Once      10/15/18 0846    10/15/18 0847  Seizure Precautions  Continuous      10/15/18 0846    10/15/18 0847  CBC Auto Differential  PROCEDURE ONCE,   Status:  Canceled      10/15/18 0846    10/15/18 0846  LORazepam (ATIVAN) injection 2 mg  Every 4 Hours PRN      10/15/18 0846    10/15/18 0846  MRI Brain With & Without Contrast  1 Time Imaging      10/15/18 0846    10/15/18 0845  acetaminophen (TYLENOL) tablet 650 mg  Every 4 Hours PRN      10/15/18 0846    10/15/18 0845  Code Status and Medical Interventions:  Continuous      10/15/18 0846    10/15/18 0845  Intake & Output  Every Shift      10/15/18 0846    10/15/18 0845  Weigh Patient  Once      10/15/18 0846    10/15/18 0845  Oxygen Therapy- Nasal Cannula; Titrate for SPO2: 90%  Continuous      10/15/18 0846    10/15/18 0845  Insert Peripheral IV  Once      10/15/18 0846    10/15/18 0845  Saline Lock & Maintain IV Access  Continuous      10/15/18 0846    10/15/18 0845  Place Sequential Compression Device  Once      10/15/18 0846    10/15/18 0845  Maintain Sequential Compression Device  Continuous      10/15/18 0846    10/15/18 0845  Activity - Ad Bridget  Until Discontinued      10/15/18 0846    10/15/18 0845  Diet Regular  Diet Effective Now      10/15/18 0846    10/15/18 0844  sodium chloride 0.9 % flush 3-10 mL  As Needed      10/15/18 0846    10/15/18 0617  Tele Bed Request  Once      10/15/18 0616    10/15/18 0616  Initiate Observation Status  Once      10/15/18 0616    10/15/18 0510  iopamidol (ISOVUE-370) 76 % injection 100 mL  Once in Imaging      10/15/18 0508    10/15/18 0504  TSH  Once      10/15/18  0503    10/15/18 0504  Myoglobin, Serum  Once      10/15/18 0503    10/15/18 0504  Magnesium  Once      10/15/18 0503    10/15/18 0504  T4, Free  Once      10/15/18 0503    10/15/18 0504  Troponin  Once      10/15/18 0503    10/15/18 0454  Comprehensive Metabolic Panel  Once      10/15/18 0434    10/15/18 0454  Magnesium  Once,   Status:  Canceled      10/15/18 0434    10/15/18 0454  Troponin  Once,   Status:  Canceled      10/15/18 0434    10/15/18 0449  POC Glucose Once  Once      10/15/18 0448    10/15/18 0449  Urine Drug Screen - Urine, Clean Catch  STAT      10/15/18 0448    10/15/18 0448  fosphenytoin (Cerebyx) 1,500 mg PE in sodium chloride 0.9 % 100 mL IVPB  Once      10/15/18 0446    10/15/18 0448  CT Angiogram Head With & Without Contrast  1 Time Imaging      10/15/18 0447    10/15/18 0448  Manual Differential  Once      10/15/18 0447    10/15/18 0448  T4, Free  Once,   Status:  Canceled      10/15/18 0447    10/15/18 0448  TSH  Once,   Status:  Canceled      10/15/18 0447    10/15/18 0448  CK  Once      10/15/18 0447    10/15/18 0448  Myoglobin, Serum  Once,   Status:  Canceled      10/15/18 0447    10/15/18 0447  XR Chest 1 View  1 Time Imaging      10/15/18 0446    10/15/18 0435  Protime-INR  Once      10/15/18 0434    10/15/18 0435  CT Head Without Contrast  1 Time Imaging      10/15/18 0434    10/15/18 0434  ECG 12 Lead  Once      10/15/18 0433    10/15/18 0434  NPO Diet  Diet Effective Now,   Status:  Canceled      10/15/18 0434    10/15/18 0434  Undress and Gown  Once      10/15/18 0434    10/15/18 0434  Cardiac monitoring  Per Hospital Policy      10/15/18 0434    10/15/18 0434  Continuous Pulse Oximetry  Per Hospital Policy      10/15/18 0434    10/15/18 0434  Oxygen Therapy- Nasal Cannula; 2 LPM; Titrate for SPO2: equal to or greater than, 92%  Continuous      10/15/18 0434    10/15/18 0434  Vital Signs  Per Hospital Policy      10/15/18 0434    10/15/18 0434  Orthostatic blood pressure  Once,    Status:  Canceled      10/15/18 0434    10/15/18 0434  Insert peripheral IV  Once      10/15/18 0434    10/15/18 0434  [place] Draw  Once      10/15/18 0434    10/15/18 0434  CBC & Differential  Once      10/15/18 0434    10/15/18 0434  POC Glucose Once  Once      10/15/18 0434    10/15/18 0434  Light Blue Top  PROCEDURE ONCE      10/15/18 0434    10/15/18 0434  Green Top (Gel)  PROCEDURE ONCE      10/15/18 0434    10/15/18 0434  Lavender Top  PROCEDURE ONCE      10/15/18 0434    10/15/18 0434  Red Top  PROCEDURE ONCE      10/15/18 0434    10/15/18 0434  CBC Auto Differential  PROCEDURE ONCE      10/15/18 0434    10/15/18 0433  sodium chloride 0.9 % flush 10 mL  As Needed      10/15/18 0434    --  hydrochlorothiazide (HYDRODIURIL) 25 MG tablet  Daily      10/15/18 0440    --  amitriptyline (ELAVIL) 50 MG tablet  Nightly      10/15/18 0440          Physician Progress Notes (last 72 hours) (Notes from 10/13/2018  9:01 AM through 10/16/2018  9:01 AM)     No notes of this type exist for this encounter.        Consult Notes (last 72 hours) (Notes from 10/13/2018  9:01 AM through 10/16/2018  9:01 AM)     No notes of this type exist for this encounter.

## 2018-10-16 NOTE — DISCHARGE SUMMARY
Date of Admission: 10/15/2018  Date of Discharge:  10/16/2018    Admitting Diagnosis: New onset seiuzure  Discharge Diagnosis: New onset seizure    Procedures Performed  MR Brain  EEG       Presenting Problem/History of Present Illness  Seizure (CMS/[facility]) [R56.9]     Pertinent Test Results:     Lab Results (last 72 hours)     Procedure Component Value Units Date/Time    Basic Metabolic Panel [[id number]]  (Normal) Collected:  10/16/18 0406    Specimen:  Blood Updated:  10/16/18 0455     Glucose 92 mg/dL      BUN 12 mg/dL      Creatinine 1.30 mg/dL      Sodium 141 mmol/L      Potassium 4.2 mmol/L      Chloride 102 mmol/L      CO2 31.0 mmol/L      Calcium 8.9 mg/dL      eGFR  African Amer 73 mL/min/1.73      BUN/Creatinine Ratio 9.2     Anion Gap 8.0 mmol/L     Narrative:       GFR Normal >60  Chronic Kidney Disease <60  Kidney Failure <15    CBC & Differential [[id number]] Collected:  10/16/18 0406    Specimen:  Blood Updated:  10/16/18 0431    Narrative:       The following orders were created for panel order CBC & Differential.  Procedure                               Abnormality         Status                     ---------                               -----------         ------                     CBC Auto Differential[[id number]]        Abnormal            Final result                 Please view results for these tests on the individual orders.    CBC Auto Differential [[id number]]  (Abnormal) Collected:  10/16/18 0406    Specimen:  Blood Updated:  10/16/18 0431     WBC 6.29 10*3/mm3      RBC 4.59 (L) 10*6/mm3      Hemoglobin 14.2 g/dL      Hematocrit 41.1 %      MCV 89.5 fL      MCH 30.9 pg      MCHC 34.5 g/dL      RDW 13.1 %      RDW-SD 42.9 fl      MPV 9.6 fL      Platelets 258 10*3/mm3      Neutrophil % 53.8 %      Lymphocyte % 37.5 %      Monocyte % 7.2 %      Eosinophil % 1.0 %      Basophil % 0.3 %      Immature Grans % 0.2 %      Neutrophils, Absolute 3.39 10*3/mm3      Lymphocytes, Absolute 2.36 10*3/mm3       Monocytes, Absolute 0.45 10*3/mm3      Eosinophils, Absolute 0.06 10*3/mm3      Basophils, Absolute 0.02 10*3/mm3      Immature Grans, Absolute 0.01 10*3/mm3      nRBC 0.0 /100 WBC     Urine Drug Screen - Urine, Clean Catch [[id number]]  (Abnormal) Collected:  10/15/18 0525    Specimen:  Urine from Urine, Catheter Updated:  10/15/18 0605     Amphetamine Screen, Urine Negative     Barbiturates Screen, Urine Negative     Benzodiazepine Screen, Urine Positive (A)     Cocaine Screen, Urine Negative     Methadone Screen, Urine Negative     Opiate Screen Positive (A)     Phencyclidine (PCP), Urine Negative     THC, Screen, Urine Negative    Narrative:       Negative Thresholds For Drugs Screened in Urine:    Amphetamines          500 ng/ml  Barbiturates          200 ng/ml  Benzodiazepines       200 ng/ml  Cocaine               150 ng/ml  Methadone             150 ng/ml  Opiates               300 ng/ml  Phencyclidine         25 ng/ml  THC                      50 ng/ml    The normal value for all drugs tested is negative. This report includes final unconfirmed screening results.  A positive result by this assay can be, at your request, sent to the Reference Lab for confirmation by gas chromatography. Unconfirmed results must not be used for non-medical purposes, such as employment or legal testing. Clinical consideration should be applied to any drug of abuse test result, particularly when unconfirmed results are used.    Troponin [[id number]]  (Normal) Collected:  10/15/18 0520    Specimen:  Blood Updated:  10/15/18 0603     Troponin I <0.012 ng/mL      Comment: Specimen hemolyzed.  Results may be affected.       TSH [[id number]]  (Normal) Collected:  10/15/18 0436    Specimen:  Blood Updated:  10/15/18 0556     TSH 3.230 mIU/mL     Myoglobin, Serum [[id number]]  (Abnormal) Collected:  10/15/18 0520    Specimen:  Blood Updated:  10/15/18 0553     Myoglobin 447.5 (H) ng/mL     [place] Draw [[id number]] Collected:  10/15/18  0436    Specimen:  Blood Updated:  10/15/18 0546    Narrative:       The following orders were created for panel order [place] Draw.  Procedure                               Abnormality         Status                     ---------                               -----------         ------                     Light Blue Top[[id number]]                                   Final result               Green Top (Gel)[[id number]]                                  Final result               Lavender Top[[id number]]                                     Final result               Red Top[[id number]]                                          Final result                 Please view results for these tests on the individual orders.    Light Blue Top [[id number]] Collected:  10/15/18 0436    Specimen:  Blood Updated:  10/15/18 0546     Extra Tube hold for add-on     Comment: Auto resulted       Green Top (Gel) [[id number]] Collected:  10/15/18 0436    Specimen:  Blood Updated:  10/15/18 0546     Extra Tube Hold for add-ons.     Comment: Auto resulted.       Lavender Top [[id number]] Collected:  10/15/18 0436    Specimen:  Blood Updated:  10/15/18 0546     Extra Tube hold for add-on     Comment: Auto resulted       Red Top [[id number]] Collected:  10/15/18 0436    Specimen:  Blood Updated:  10/15/18 0546     Extra Tube Hold for add-ons.     Comment: Auto resulted.       Magnesium [[id number]]  (Normal) Collected:  10/15/18 0520    Specimen:  Blood Updated:  10/15/18 0545     Magnesium 2.2 mg/dL      Comment: Specimen hemolyzed.  Results may be affected.       Comprehensive Metabolic Panel [[id number]]  (Abnormal) Collected:  10/15/18 0520    Specimen:  Blood Updated:  10/15/18 0544     Glucose 144 (H) mg/dL      BUN 16 mg/dL      Creatinine 1.66 (H) mg/dL      Sodium 137 mmol/L      Potassium 3.9 mmol/L      Comment: Specimen hemolyzed.  Results may be affected.        Chloride 98 mmol/L      CO2 21.0 (L) mmol/L      Calcium 9.7 mg/dL      Total  Protein 6.9 g/dL      Comment: Specimen hemolyzed.  Results may be affected.        Albumin 3.90 g/dL      Comment: Specimen hemolyzed. Results may be affected.        ALT (SGPT) 53 U/L      Comment: Specimen hemolyzed.  Results may be affected.        AST (SGOT) 43 U/L      Comment: Specimen hemolyzed.  Results may be affected.        Alkaline Phosphatase 66 U/L      Comment: Specimen hemolyzed. Results may be affected.        Total Bilirubin 0.4 mg/dL      eGFR  African Amer 55 (L) mL/min/1.73      Globulin 3.0 gm/dL      A/G Ratio 1.3 g/dL      BUN/Creatinine Ratio 9.6     Anion Gap 18.0 (H) mmol/L     T4, Free [[id number]]  (Normal) Collected:  10/15/18 0436    Specimen:  Blood Updated:  10/15/18 0543     Free T4 0.95 ng/dL     CK [[id number]]  (Normal) Collected:  10/15/18 0520    Specimen:  Blood Updated:  10/15/18 0541     Creatine Kinase 165 U/L     CBC & Differential [[id number]] Collected:  10/15/18 0436    Specimen:  Blood Updated:  10/15/18 0511    Narrative:       The following orders were created for panel order CBC & Differential.  Procedure                               Abnormality         Status                     ---------                               -----------         ------                     Manual Differential[[id number]]          Abnormal            Final result               CBC Auto Differential[[id number]]        Abnormal            Final result                 Please view results for these tests on the individual orders.    Manual Differential [[id number]]  (Abnormal) Collected:  10/15/18 0436    Specimen:  Blood Updated:  10/15/18 0511     Neutrophil % 39.6 %      Lymphocyte % 29.2 %      Monocyte % 6.3 %      Eosinophil % 4.2 (H) %      Basophil % 2.1 (H) %      Metamyelocyte % 1.0 (H) %      Atypical Lymphocyte % 17.7 (H) %      Neutrophils Absolute 4.59 10*3/mm3      Lymphocytes Absolute 3.38 10*3/mm3      Monocytes Absolute 0.73 10*3/mm3      Eosinophils Absolute 0.49 10*3/mm3       Basophils Absolute 0.24 (H) 10*3/mm3      Poikilocytes Slight/1+     Smudge Cells Slight/1+     Clumped Platelets Present    CBC Auto Differential [[id number]]  (Abnormal) Collected:  10/15/18 0436    Specimen:  Blood Updated:  10/15/18 0511     WBC 11.59 (H) 10*3/mm3      RBC 5.01 10*6/mm3      Hemoglobin 15.6 g/dL      Hematocrit 47.4 %      MCV 94.6 fL      MCH 31.1 pg      MCHC 32.9 (L) g/dL      RDW 13.1 %      RDW-SD 45.3 fl      MPV 10.0 fL      Platelets 360 10*3/mm3     Protime-INR [[id number]]  (Normal) Collected:  10/15/18 0436    Specimen:  Blood Updated:  10/15/18 0454     Protime 13.0 Seconds      INR 0.95    POC Glucose Once [[id number]]  (Abnormal) Collected:  10/15/18 0436    Specimen:  Blood Updated:  10/15/18 0448     Glucose 164 (H) mg/dL      Comment: : [id number] Hilary JamiMeter ID: [id number]           Imaging Results (last 72 hours)     Procedure Component Value Units Date/Time    MRI Brain With & Without Contrast [[id number]] Collected:  10/15/18 1733     Updated:  10/15/18 1836    Narrative:       EXAM: MR BRAIN WITHOUT AND WITH IV CONTRAST 10/15/2018     COMPARISON: Head CT dated earlier today      HISTORY: 43 years-old Male. Seizures new or progressive;  R56.9-Unspecified convulsions     TECHNIQUE:   Routine pulse sequences were obtained of the brain before and after the  administration of IV contrast.      REPORT:      Motion degraded examination.      There is no diffusion restriction to suggest acute infarct. No midline  shift. No midline shift.     Severe encephalomalacia and gliosis in the involving the bifrontal  lobes. No discrete enhancing soft tissue signal within the surgical  cavity. The hippocampal formations are symmetric. No suspicious  enhancement within the brain parenchyma. No acute intracranial  hemorrhage. Mild chronic microvascular changes.     Craniotomy defects, bifrontal.     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 Associated enhancement at that location is  probably postsurgical in nature.  Paranasal sinus mucosal disease also present.             Impression:       1.  No acute abnormalities.  2.  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  3.  Severely and cephalization gliosis in the bifrontal lobes.  This report was finalized on 10/15/2018 18:32 by Dr. Damaris Valencia MD.    CT Angiogram Head With & Without Contrast [[id number]] Collected:  10/15/18 0723     Updated:  10/15/18 1203    Narrative:       EXAMINATION:  CT ANGIOGRAM HEAD W WO CONTRAST-  10/15/2018 4:57 AM CDT     HISTORY: seizure; left paralysis      COMPARISON: Syncope     TECHNIQUE: CT angiography head was performed.     Radiation dose equals DLP blank mGy-cm.  Automated exposure control dose  reduction technique was implemented.     Thin section axial imaging was obtained with intravenous contrast. 2-D  sagittal and coronal reconstruction images were generated. Maximum  intensity projection angiographic imaging was obtained in multiple  projections.     Examination was sent to [facility] for preliminary interpretation.        FINDINGS:      The distal vertebral arteries are patent. The basilar artery imaged  appropriately.     The distal internal carotid arteries are intact without focal  steno-occlusive change.     The vessels comprising the [place] are imaged without aneurysm.     The anterior cerebral arteries are imaged symmetrically.     The middle cerebral arteries are also imaged symmetrically.     The posterior cerebral arteries are observed without focal  steno-occlusive changes.     Distally the basilar structures are observed appropriately without  angiographic abnormality.     The dural venous sinuses enhance without thrombus.       Impression:       1. Negative CT angiography of the head.  This  report was finalized on 10/15/2018 12:00 by Dr. Nabil Blanchard MD.    XR Chest 1 View [[id number]] Collected:  10/15/18 0723     Updated:  10/15/18 0727    Narrative:       EXAMINATION: XR CHEST 1 VW-. 10/15/2018 7:23 AM CDT     CHEST, ONE VIEW:     HISTORY: Aspiration     COMPARISON: None     A single frontal chest radiograph was obtained.     FINDINGS:     Level inspiration is shallow and lung volumes diminished.     The lungs are clear however without consolidating infiltrates.     The heart is normal in size, pulmonary circulation appropriate, without  heart failure.     The bony structures are intact.                                     Impression:       1. Shallow inspiration with diminished lung volumes.  2. No definite acute cardiopulmonary process.     This report was finalized on 10/15/2018 07:24 by Dr. Nabil Blanchard MD.    CT Head Without Contrast [[id number]] Collected:  10/15/18 0706     Updated:  10/15/18 0711    Narrative:       EXAMINATION: CT HEAD WO CONTRAST-  10/15/2018 7:02 AM CDT     CT SCAN OF THE HEAD, WITHOUT CONTRAST:      HISTORY: Syncope, left-sided paralysis     COMPARISONS: None      TECHNIQUE:     Radiation dose equals  mGy-cm.  Automated exposure control dose  reduction technique was implemented.        CT evaluation of the head without intravenous contrast. 5 mm transaxial  images were obtained.   2-D sagittal and coronal reconstruction images  were generated.     FINDINGS: Examination was sent to [facility] for preliminary  interpretation.     There are craniotomy changes identified in the frontal lobes with  calvarial defect with associated plating.     There are low-attenuation changes involving the cortex and deep white  matter of the frontal lobes, particularly inferiorly, compatible with  encephalomalacia with differential considerations include changes from  old trauma as well as infarction.     There is no intra or extra-axial hemorrhage     There is no mass effect or midline  shift.     There is no hydrocephalus.     There is opacification of the sphenoid sinuses. Partial opacification  the ethmoid sinuses identified.     No acute osseous abnormalities observed.       Impression:       1. No CT evidence of an acute intracranial process.       2. Changes from frontal craniotomy with bilateral inferior frontal lobe  encephalomalacia change.  3. Paranasal sinus disease.                 This report was finalized on 10/15/2018 07:08 by Dr. Nabil Blanchard MD.        EEG Report     Requesting Physician: Moise Crawley MD     Reading Physician:  Moise Crawley MD        Study Date: 10/15/18     Read Date: 10/15/18        Clinical History:  43-year-old male status post craniotomy for bilateral meningioma resection.  He had multiple seizures overnight there were focal in nature.     Medications:  Elavil and hydrochlorthiazide     Report:  This is an 18 channel EEG recording.  The background activity has a drastic asymmetry to it.  The right side is much slower than left.  The right side is in the alpha range and even goes into the beta frequencies.  Unilateral slowing seen over the right hemisphere throughout the recording.     Sleep Architecture:  Not seen  Photic Stimulation:  Not performed  Hyperventilation:  Not performed     Asymetries:  As above  Epileptiform Activities:  None     Findings: This is an abnormal EEG showing unilateral slowing over the right hemisphere.  This could be secondary to an underlying CNS structural lesion.  This could also represent a postictal state from a partial seizure arising in the right hemisphere.  Clinical correlation is recommended.     Moise Crawley MD                Hospital Course  Patient is a 43 y.o. male presented with a history of bilateral meningioma resection 2015.  He was placed on Keppra initially for several months but had some GI side effects with this.  He has not been on seizure medications request and time.  He presented after he  had brief multiple seizures.  This was stopped with Versed.  He was initially loaded with fosphenytoin.  He underwent an MRI the brain showing postsurgical changes of the bilateral meningioma.  EEG was performed as well.  The results can be seen below.  The patient was ready for discharge on October 16.  His neurologic exam was nonfocal.  I chose not to use Keppra as the patient had previous side effects from this.  Secondary to his insurance I believed that Vimpat was a poor choice as well.  I am using Depakote 500 mg every 8 hours to load him.  I requested that he return to the office in 2 weeks.  At that time I would like to start Tegretol and transition over to Tegretol monotherapy and came off Depakote.  Seizure precautions were discussed with the patient including driving restrictions.  He expressed understanding.      Discharge Disposition  Home or Self Care    Discharge Medications     Discharge Medications      New Medications      Instructions Start Date   divalproex 500 MG DR tablet  Commonly known as:  DEPAKOTE   500 mg, Oral, 3 Times Daily         Continue These Medications      Instructions Start Date   amitriptyline 50 MG tablet  Commonly known as:  ELAVIL   50 mg, Oral, Nightly         ASK your doctor about these medications      Instructions Start Date   hydrochlorothiazide 25 MG tablet  Commonly known as:  HYDRODIURIL   25 mg, Oral, Daily             Discharge Diet:   Regular  Activity at Discharge:   Recommended to observe all seizure precautions, including, but not limited to no driving until seizure free for more than 3 months- as per State driving regulation / law;   Avoid all high-risk activity,   Take showers instead of baths,   Avoid swimming without observation,   Avoid open heat sources,   Avoid working at heights and   Avoid engaging in all potentially hazardous activities.   Patient expressed clear understanding    Follow-up Appointments  No future appointments.  Follow up with neurology  in 2 weeks    Test Results Pending at Discharge  none     Moise Crawley MD  10/16/18  11:35 AM    Time: Discharge 40 min

## 2018-10-17 NOTE — PAYOR COMM NOTE
"DC HOME 10-16-18  UR PHONE    [phone]    Jocelyne Mata  (43 y.o. Male)     Date of Birth Social Security Number Address Home Phone MRN    [id number]  [id number] [facility] [phone] [id number]    [facility] [employer] Status          Mormon Single       Admission Date Admission Type Admitting Provider Attending Provider Department, Room/Bed    10/15/18 Emergency Moise Crawley MD  [facility] 3A, 347/1    Discharge Date Discharge Disposition Discharge Destination        10/16/2018 Home or Self Care              Attending Provider:  (none)   Allergies:  No Known Allergies    Isolation:  None   Infection:  None   Code Status:  Prior    Ht:  188 cm (74\")   Wt:  104 kg (229 lb 15 oz)    Admission Cmt:  None   Principal Problem:  None                Active Insurance as of 10/15/2018     Primary Coverage     Payor Plan Insurance Group Employer/Plan Group    [employer] [place] [employer] [place]      Payor Plan Address Payor Plan Phone Number Effective From Effective To    [address] [address] [phone] 10/15/2018     [facility] [id number]       Subscriber Name Subscriber Birth Date Member ID       JOCELYNE MATA 1975 [id number]                 Emergency Contacts      (Rel.) Home Phone Work Phone Mobile Phone    Carmen Walton (Significant Other) -- -- [phone]               Discharge Summary      Moise Crawley MD at 10/16/2018 11:35 AM          Date of Admission: 10/15/2018  Date of Discharge:  10/16/2018    Admitting Diagnosis: New onset seiuzure  Discharge Diagnosis: New onset seizure    Procedures Performed  MR Brain  EEG       Presenting Problem/History of Present Illness  Seizure (CMS/[facility]) [R56.9]     Pertinent Test Results:     Lab Results (last 72 hours)     Procedure Component Value Units Date/Time    Basic Metabolic Panel [[id number]]  (Normal) Collected:  10/16/18 0406    Specimen:  Blood Updated:  10/16/18 0455     Glucose 92 " mg/dL      BUN 12 mg/dL      Creatinine 1.30 mg/dL      Sodium 141 mmol/L      Potassium 4.2 mmol/L      Chloride 102 mmol/L      CO2 31.0 mmol/L      Calcium 8.9 mg/dL      eGFR  African Amer 73 mL/min/1.73      BUN/Creatinine Ratio 9.2     Anion Gap 8.0 mmol/L     Narrative:       GFR Normal >60  Chronic Kidney Disease <60  Kidney Failure <15    CBC & Differential [[id number]] Collected:  10/16/18 0406    Specimen:  Blood Updated:  10/16/18 0431    Narrative:       The following orders were created for panel order CBC & Differential.  Procedure                               Abnormality         Status                     ---------                               -----------         ------                     CBC Auto Differential[[id number]]        Abnormal            Final result                 Please view results for these tests on the individual orders.    CBC Auto Differential [[id number]]  (Abnormal) Collected:  10/16/18 0406    Specimen:  Blood Updated:  10/16/18 0431     WBC 6.29 10*3/mm3      RBC 4.59 (L) 10*6/mm3      Hemoglobin 14.2 g/dL      Hematocrit 41.1 %      MCV 89.5 fL      MCH 30.9 pg      MCHC 34.5 g/dL      RDW 13.1 %      RDW-SD 42.9 fl      MPV 9.6 fL      Platelets 258 10*3/mm3      Neutrophil % 53.8 %      Lymphocyte % 37.5 %      Monocyte % 7.2 %      Eosinophil % 1.0 %      Basophil % 0.3 %      Immature Grans % 0.2 %      Neutrophils, Absolute 3.39 10*3/mm3      Lymphocytes, Absolute 2.36 10*3/mm3      Monocytes, Absolute 0.45 10*3/mm3      Eosinophils, Absolute 0.06 10*3/mm3      Basophils, Absolute 0.02 10*3/mm3      Immature Grans, Absolute 0.01 10*3/mm3      nRBC 0.0 /100 WBC     Urine Drug Screen - Urine, Clean Catch [[id number]]  (Abnormal) Collected:  10/15/18 0525    Specimen:  Urine from Urine, Catheter Updated:  10/15/18 0605     Amphetamine Screen, Urine Negative     Barbiturates Screen, Urine Negative     Benzodiazepine Screen, Urine Positive (A)     Cocaine Screen, Urine Negative      Methadone Screen, Urine Negative     Opiate Screen Positive (A)     Phencyclidine (PCP), Urine Negative     THC, Screen, Urine Negative    Narrative:       Negative Thresholds For Drugs Screened in Urine:    Amphetamines          500 ng/ml  Barbiturates          200 ng/ml  Benzodiazepines       200 ng/ml  Cocaine               150 ng/ml  Methadone             150 ng/ml  Opiates               300 ng/ml  Phencyclidine         25 ng/ml  THC                      50 ng/ml    The normal value for all drugs tested is negative. This report includes final unconfirmed screening results.  A positive result by this assay can be, at your request, sent to the Reference Lab for confirmation by gas chromatography. Unconfirmed results must not be used for non-medical purposes, such as employment or legal testing. Clinical consideration should be applied to any drug of abuse test result, particularly when unconfirmed results are used.    Troponin [[id number]]  (Normal) Collected:  10/15/18 0520    Specimen:  Blood Updated:  10/15/18 0603     Troponin I <0.012 ng/mL      Comment: Specimen hemolyzed.  Results may be affected.       TSH [[id number]]  (Normal) Collected:  10/15/18 0436    Specimen:  Blood Updated:  10/15/18 0556     TSH 3.230 mIU/mL     Myoglobin, Serum [[id number]]  (Abnormal) Collected:  10/15/18 0520    Specimen:  Blood Updated:  10/15/18 0553     Myoglobin 447.5 (H) ng/mL     [place] Draw [[id number]] Collected:  10/15/18 0436    Specimen:  Blood Updated:  10/15/18 0546    Narrative:       The following orders were created for panel order [place] Draw.  Procedure                               Abnormality         Status                     ---------                               -----------         ------                     Light Blue Top[[id number]]                                   Final result               Green Top (Gel)[[id number]]                                  Final result               Lavender  Top[[id number]]                                     Final result               Red Top[[id number]]                                          Final result                 Please view results for these tests on the individual orders.    Light Blue Top [[id number]] Collected:  10/15/18 0436    Specimen:  Blood Updated:  10/15/18 0546     Extra Tube hold for add-on     Comment: Auto resulted       Green Top (Gel) [[id number]] Collected:  10/15/18 0436    Specimen:  Blood Updated:  10/15/18 0546     Extra Tube Hold for add-ons.     Comment: Auto resulted.       Lavender Top [[id number]] Collected:  10/15/18 0436    Specimen:  Blood Updated:  10/15/18 0546     Extra Tube hold for add-on     Comment: Auto resulted       Red Top [[id number]] Collected:  10/15/18 0436    Specimen:  Blood Updated:  10/15/18 0546     Extra Tube Hold for add-ons.     Comment: Auto resulted.       Magnesium [[id number]]  (Normal) Collected:  10/15/18 0520    Specimen:  Blood Updated:  10/15/18 0545     Magnesium 2.2 mg/dL      Comment: Specimen hemolyzed.  Results may be affected.       Comprehensive Metabolic Panel [[id number]]  (Abnormal) Collected:  10/15/18 0520    Specimen:  Blood Updated:  10/15/18 0544     Glucose 144 (H) mg/dL      BUN 16 mg/dL      Creatinine 1.66 (H) mg/dL      Sodium 137 mmol/L      Potassium 3.9 mmol/L      Comment: Specimen hemolyzed.  Results may be affected.        Chloride 98 mmol/L      CO2 21.0 (L) mmol/L      Calcium 9.7 mg/dL      Total Protein 6.9 g/dL      Comment: Specimen hemolyzed.  Results may be affected.        Albumin 3.90 g/dL      Comment: Specimen hemolyzed. Results may be affected.        ALT (SGPT) 53 U/L      Comment: Specimen hemolyzed.  Results may be affected.        AST (SGOT) 43 U/L      Comment: Specimen hemolyzed.  Results may be affected.        Alkaline Phosphatase 66 U/L      Comment: Specimen hemolyzed. Results may be affected.        Total Bilirubin 0.4 mg/dL      eGFR  African Amer 55 (L)  mL/min/1.73      Globulin 3.0 gm/dL      A/G Ratio 1.3 g/dL      BUN/Creatinine Ratio 9.6     Anion Gap 18.0 (H) mmol/L     T4, Free [[id number]]  (Normal) Collected:  10/15/18 0436    Specimen:  Blood Updated:  10/15/18 0543     Free T4 0.95 ng/dL     CK [[id number]]  (Normal) Collected:  10/15/18 0520    Specimen:  Blood Updated:  10/15/18 0541     Creatine Kinase 165 U/L     CBC & Differential [[id number]] Collected:  10/15/18 0436    Specimen:  Blood Updated:  10/15/18 0511    Narrative:       The following orders were created for panel order CBC & Differential.  Procedure                               Abnormality         Status                     ---------                               -----------         ------                     Manual Differential[[id number]]          Abnormal            Final result               CBC Auto Differential[[id number]]        Abnormal            Final result                 Please view results for these tests on the individual orders.    Manual Differential [[id number]]  (Abnormal) Collected:  10/15/18 0436    Specimen:  Blood Updated:  10/15/18 0511     Neutrophil % 39.6 %      Lymphocyte % 29.2 %      Monocyte % 6.3 %      Eosinophil % 4.2 (H) %      Basophil % 2.1 (H) %      Metamyelocyte % 1.0 (H) %      Atypical Lymphocyte % 17.7 (H) %      Neutrophils Absolute 4.59 10*3/mm3      Lymphocytes Absolute 3.38 10*3/mm3      Monocytes Absolute 0.73 10*3/mm3      Eosinophils Absolute 0.49 10*3/mm3      Basophils Absolute 0.24 (H) 10*3/mm3      Poikilocytes Slight/1+     Smudge Cells Slight/1+     Clumped Platelets Present    CBC Auto Differential [[id number]]  (Abnormal) Collected:  10/15/18 0436    Specimen:  Blood Updated:  10/15/18 0511     WBC 11.59 (H) 10*3/mm3      RBC 5.01 10*6/mm3      Hemoglobin 15.6 g/dL      Hematocrit 47.4 %      MCV 94.6 fL      MCH 31.1 pg      MCHC 32.9 (L) g/dL      RDW 13.1 %      RDW-SD 45.3 fl      MPV 10.0 fL      Platelets 360 10*3/mm3      Protime-INR [[id number]]  (Normal) Collected:  10/15/18 0436    Specimen:  Blood Updated:  10/15/18 0454     Protime 13.0 Seconds      INR 0.95    POC Glucose Once [[id number]]  (Abnormal) Collected:  10/15/18 0436    Specimen:  Blood Updated:  10/15/18 0448     Glucose 164 (H) mg/dL      Comment: : [id number] Hilary JamiMeter ID: [id number]           Imaging Results (last 72 hours)     Procedure Component Value Units Date/Time    MRI Brain With & Without Contrast [[id number]] Collected:  10/15/18 1733     Updated:  10/15/18 1836    Narrative:       EXAM: MR BRAIN WITHOUT AND WITH IV CONTRAST 10/15/2018     COMPARISON: Head CT dated earlier today      HISTORY: 43 years-old Male. Seizures new or progressive;  R56.9-Unspecified convulsions     TECHNIQUE:   Routine pulse sequences were obtained of the brain before and after the  administration of IV contrast.      REPORT:      Motion degraded examination.      There is no diffusion restriction to suggest acute infarct. No midline  shift. No midline shift.     Severe encephalomalacia and gliosis in the involving the bifrontal  lobes. No discrete enhancing soft tissue signal within the surgical  cavity. The hippocampal formations are symmetric. No suspicious  enhancement within the brain parenchyma. No acute intracranial  hemorrhage. Mild chronic microvascular changes.     Craniotomy defects, bifrontal.     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 Associated enhancement at that location is  probably postsurgical in nature.  Paranasal sinus mucosal disease also present.             Impression:       1.  No acute abnormalities.  2.  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  3.  Severely and cephalization gliosis in the bifrontal lobes.  This report was finalized on 10/15/2018 18:32 by  Dr. Damaris Valencia MD.    CT Angiogram Head With & Without Contrast [[id number]] Collected:  10/15/18 0723     Updated:  10/15/18 1203    Narrative:       EXAMINATION:  CT ANGIOGRAM HEAD W WO CONTRAST-  10/15/2018 4:57 AM CDT     HISTORY: seizure; left paralysis      COMPARISON: Syncope     TECHNIQUE: CT angiography head was performed.     Radiation dose equals DLP blank mGy-cm.  Automated exposure control dose  reduction technique was implemented.     Thin section axial imaging was obtained with intravenous contrast. 2-D  sagittal and coronal reconstruction images were generated. Maximum  intensity projection angiographic imaging was obtained in multiple  projections.     Examination was sent to [facility]rad for preliminary interpretation.        FINDINGS:      The distal vertebral arteries are patent. The basilar artery imaged  appropriately.     The distal internal carotid arteries are intact without focal  steno-occlusive change.     The vessels comprising the [place] of [place] are imaged without aneurysm.     The anterior cerebral arteries are imaged symmetrically.     The middle cerebral arteries are also imaged symmetrically.     The posterior cerebral arteries are observed without focal  steno-occlusive changes.     Distally the basilar structures are observed appropriately without  angiographic abnormality.     The dural venous sinuses enhance without thrombus.       Impression:       1. Negative CT angiography of the head.  This report was finalized on 10/15/2018 12:00 by Dr. Nabil Blanchard MD.    XR Chest 1 View [[id number]] Collected:  10/15/18 0723     Updated:  10/15/18 0727    Narrative:       EXAMINATION: XR CHEST 1 VW-. 10/15/2018 7:23 AM CDT     CHEST, ONE VIEW:     HISTORY: Aspiration     COMPARISON: None     A single frontal chest radiograph was obtained.     FINDINGS:     Level inspiration is shallow and lung volumes diminished.     The lungs are clear however without consolidating infiltrates.     The  heart is normal in size, pulmonary circulation appropriate, without  heart failure.     The bony structures are intact.                                     Impression:       1. Shallow inspiration with diminished lung volumes.  2. No definite acute cardiopulmonary process.     This report was finalized on 10/15/2018 07:24 by Dr. Nabil Blanchard MD.    CT Head Without Contrast [[id number]] Collected:  10/15/18 0706     Updated:  10/15/18 0711    Narrative:       EXAMINATION: CT HEAD WO CONTRAST-  10/15/2018 7:02 AM CDT     CT SCAN OF THE HEAD, WITHOUT CONTRAST:      HISTORY: Syncope, left-sided paralysis     COMPARISONS: None      TECHNIQUE:     Radiation dose equals  mGy-cm.  Automated exposure control dose  reduction technique was implemented.        CT evaluation of the head without intravenous contrast. 5 mm transaxial  images were obtained.   2-D sagittal and coronal reconstruction images  were generated.     FINDINGS: Examination was sent to [facility] for preliminary  interpretation.     There are craniotomy changes identified in the frontal lobes with  calvarial defect with associated plating.     There are low-attenuation changes involving the cortex and deep white  matter of the frontal lobes, particularly inferiorly, compatible with  encephalomalacia with differential considerations include changes from  old trauma as well as infarction.     There is no intra or extra-axial hemorrhage     There is no mass effect or midline shift.     There is no hydrocephalus.     There is opacification of the sphenoid sinuses. Partial opacification  the ethmoid sinuses identified.     No acute osseous abnormalities observed.       Impression:       1. No CT evidence of an acute intracranial process.       2. Changes from frontal craniotomy with bilateral inferior frontal lobe  encephalomalacia change.  3. Paranasal sinus disease.                 This report was finalized on 10/15/2018 07:08 by Dr. Nabil Blanchard MD.         EEG Report     Requesting Physician: Moise Crawley MD     Reading Physician:  Moise Crawley MD        Study Date: 10/15/18     Read Date: 10/15/18        Clinical History:  43-year-old male status post craniotomy for bilateral meningioma resection.  He had multiple seizures overnight there were focal in nature.     Medications:  Elavil and hydrochlorthiazide     Report:  This is an 18 channel EEG recording.  The background activity has a drastic asymmetry to it.  The right side is much slower than left.  The right side is in the alpha range and even goes into the beta frequencies.  Unilateral slowing seen over the right hemisphere throughout the recording.     Sleep Architecture:  Not seen  Photic Stimulation:  Not performed  Hyperventilation:  Not performed     Asymetries:  As above  Epileptiform Activities:  None     Findings: This is an abnormal EEG showing unilateral slowing over the right hemisphere.  This could be secondary to an underlying CNS structural lesion.  This could also represent a postictal state from a partial seizure arising in the right hemisphere.  Clinical correlation is recommended.     Moise Crawley MD                Hospital Course  Patient is a 43 y.o. male presented with a history of bilateral meningioma resection 2015.  He was placed on Keppra initially for several months but had some GI side effects with this.  He has not been on seizure medications request and time.  He presented after he had brief multiple seizures.  This was stopped with Versed.  He was initially loaded with fosphenytoin.  He underwent an MRI the brain showing postsurgical changes of the bilateral meningioma.  EEG was performed as well.  The results can be seen below.  The patient was ready for discharge on October 16.  His neurologic exam was nonfocal.  I chose not to use Keppra as the patient had previous side effects from this.  Secondary to his insurance I believed that Vimpat was a poor choice as  well.  I am using Depakote 500 mg every 8 hours to load him.  I requested that he return to the office in 2 weeks.  At that time I would like to start Tegretol and transition over to Tegretol monotherapy and came off Depakote.  Seizure precautions were discussed with the patient including driving restrictions.  He expressed understanding.      Discharge Disposition  Home or Self Care    Discharge Medications     Discharge Medications      New Medications      Instructions Start Date   divalproex 500 MG DR tablet  Commonly known as:  DEPAKOTE   500 mg, Oral, 3 Times Daily         Continue These Medications      Instructions Start Date   amitriptyline 50 MG tablet  Commonly known as:  ELAVIL   50 mg, Oral, Nightly         ASK your doctor about these medications      Instructions Start Date   hydrochlorothiazide 25 MG tablet  Commonly known as:  HYDRODIURIL   25 mg, Oral, Daily             Discharge Diet:   Regular  Activity at Discharge:   Recommended to observe all seizure precautions, including, but not limited to no driving until seizure free for more than 3 months- as per State driving regulation / law;   Avoid all high-risk activity,   Take showers instead of baths,   Avoid swimming without observation,   Avoid open heat sources,   Avoid working at heights and   Avoid engaging in all potentially hazardous activities.   Patient expressed clear understanding    Follow-up Appointments  No future appointments.  Follow up with neurology in 2 weeks    Test Results Pending at Discharge  none     Moise Crawley MD  10/16/18  11:35 AM    Time: Discharge 40 min            Electronically signed by Moise Crawley MD at 10/16/2018 11:38 AM

## 2018-10-18 ENCOUNTER — TELEPHONE (OUTPATIENT)
Dept: NEUROLOGY | Facility: CLINIC | Age: 43
End: 2018-10-18

## 2018-10-18 NOTE — TELEPHONE ENCOUNTER
Carmen said Chirag hasn't been acting like he normally does.  He is usually affectionate and observant, but has been emotionally absent since he was discharged.  He doesn't like the way Depakote is making him feel and it is causing nausea.  She said he had another seizure this morning that lasted about a minute, she said he just stared into space.  She is concerned.  I told her she could take him to ER if she felt he needed to go.  Mukesh reviewed his chart and decreased Depakote to BID.  We have moved his appointment with Corazon to October 24th.

## 2018-10-19 ENCOUNTER — OFFICE VISIT (OUTPATIENT)
Dept: INTERNAL MEDICINE | Age: 43
End: 2018-10-19
Payer: MEDICAID

## 2018-10-19 VITALS
WEIGHT: 238.4 LBS | BODY MASS INDEX: 30.6 KG/M2 | SYSTOLIC BLOOD PRESSURE: 142 MMHG | DIASTOLIC BLOOD PRESSURE: 90 MMHG | OXYGEN SATURATION: 95 % | TEMPERATURE: 96 F | HEIGHT: 74 IN | HEART RATE: 109 BPM

## 2018-10-19 DIAGNOSIS — Z86.011 HISTORY OF MENINGIOMA OF THE BRAIN: ICD-10-CM

## 2018-10-19 DIAGNOSIS — I10 ESSENTIAL HYPERTENSION: ICD-10-CM

## 2018-10-19 DIAGNOSIS — R11.0 NAUSEA: ICD-10-CM

## 2018-10-19 DIAGNOSIS — R56.9 SEIZURE (HCC): Primary | ICD-10-CM

## 2018-10-19 PROCEDURE — 99214 OFFICE O/P EST MOD 30 MIN: CPT | Performed by: PHYSICIAN ASSISTANT

## 2018-10-19 PROCEDURE — 1111F DSCHRG MED/CURRENT MED MERGE: CPT | Performed by: PHYSICIAN ASSISTANT

## 2018-10-19 RX ORDER — ONDANSETRON 4 MG/1
4 TABLET, FILM COATED ORAL EVERY 12 HOURS PRN
Qty: 60 TABLET | Refills: 1 | Status: SHIPPED | OUTPATIENT
Start: 2018-10-19

## 2018-10-19 RX ORDER — DIVALPROEX SODIUM 500 MG/1
500 TABLET, DELAYED RELEASE ORAL
COMMUNITY
Start: 2018-10-16 | End: 2019-02-28

## 2018-10-19 ASSESSMENT — ENCOUNTER SYMPTOMS
RHINORRHEA: 0
DIARRHEA: 0
SHORTNESS OF BREATH: 0
COUGH: 0
ABDOMINAL PAIN: 0
COLOR CHANGE: 0
SORE THROAT: 0
NAUSEA: 0
EYE REDNESS: 0
VOMITING: 0
EYE PAIN: 0
CONSTIPATION: 0
PHOTOPHOBIA: 0
WHEEZING: 0
BACK PAIN: 0
CHEST TIGHTNESS: 0
SINUS PRESSURE: 0

## 2018-10-19 NOTE — PROGRESS NOTES
Essential hypertension 07/20/2018    History of meningioma of the brain 07/20/2018    Hemorrhoids 07/20/2018     Resolved Ambulatory Problems     Diagnosis Date Noted    No Resolved Ambulatory Problems     Past Medical History:   Diagnosis Date    Headache     Hypertension        Past Medical History:   Diagnosis Date    Headache     Hypertension        Prior to Visit Medications    Medication Sig Taking? Authorizing Provider   divalproex (DEPAKOTE) 500 MG DR tablet Take 500 mg by mouth Yes Historical Provider, MD   ondansetron (ZOFRAN) 4 MG tablet Take 1 tablet by mouth every 12 hours as needed for Nausea or Vomiting Yes MOLLY Aguilar   hydrochlorothiazide (HYDRODIURIL) 25 MG tablet Take 1 tablet by mouth daily Yes MOLLY Aguilar   amitriptyline (ELAVIL) 50 MG tablet Take 1 tablet by mouth nightly Yes MOLLY Aguilar       Past Surgical History:   Procedure Laterality Date    BRAIN SURGERY  2015    tumor removal       History reviewed. No pertinent family history. No Known Allergies    Social History     Social History    Marital status: Single     Spouse name: N/A    Number of children: N/A    Years of education: N/A     Occupational History    Not on file. Social History Main Topics    Smoking status: Current Every Day Smoker     Packs/day: 0.50     Types: Cigarettes    Smokeless tobacco: Never Used    Alcohol use Yes    Drug use: No    Sexual activity: Not on file     Other Topics Concern    Not on file     Social History Narrative    No narrative on file       Review of Systems  Review of Systems   Constitutional: Negative for activity change, appetite change, fatigue and unexpected weight change. HENT: Negative for congestion, ear pain, postnasal drip, rhinorrhea, sinus pressure, sneezing and sore throat. Eyes: Negative for photophobia, pain and redness. Respiratory: Negative for cough, chest tightness, shortness of breath and wheezing.     Cardiovascular: Negative for chest pain, palpitations and leg swelling. Gastrointestinal: Negative for abdominal pain, constipation, diarrhea, nausea and vomiting. Genitourinary: Negative for dysuria, frequency, hematuria and urgency. Musculoskeletal: Negative for arthralgias, back pain, gait problem, joint swelling and myalgias. Skin: Negative for color change, pallor and wound. Neurological: Positive for seizures and headaches. Negative for dizziness, speech difficulty, weakness, light-headedness and numbness. Hematological: Negative for adenopathy. Does not bruise/bleed easily. Psychiatric/Behavioral: Negative for confusion. The patient is not nervous/anxious. Current Outpatient Prescriptions   Medication Sig Dispense Refill    divalproex (DEPAKOTE) 500 MG DR tablet Take 500 mg by mouth      ondansetron (ZOFRAN) 4 MG tablet Take 1 tablet by mouth every 12 hours as needed for Nausea or Vomiting 60 tablet 1    hydrochlorothiazide (HYDRODIURIL) 25 MG tablet Take 1 tablet by mouth daily 90 tablet 3    amitriptyline (ELAVIL) 50 MG tablet Take 1 tablet by mouth nightly 90 tablet 3     No current facility-administered medications for this visit. BP (!) 142/90   Pulse 109   Temp 96 °F (35.6 °C)   Ht 6' 2\" (1.88 m)   Wt 238 lb 6.4 oz (108.1 kg)   SpO2 95%   BMI 30.61 kg/m²     PHYSICAL EXAM  Physical Exam   Constitutional: Vital signs are normal. He appears well-developed and well-nourished. HENT:   Head: Normocephalic and atraumatic. Right Ear: External ear normal.   Left Ear: External ear normal.   Nose: Nose normal.   Eyes: Right eye exhibits no discharge. Left eye exhibits no discharge. Neck: Normal range of motion. No tracheal deviation present. Cardiovascular: Normal rate, regular rhythm and normal heart sounds. Exam reveals no gallop and no friction rub. No murmur heard. Pulmonary/Chest: Effort normal and breath sounds normal. No respiratory distress. He has no wheezes.  He has Instructions: As always, patient is advised to bring in medication bottles in order to correctly reconcile with our current list.      Tere Lea PA-C    EMR Dragon/transcription disclaimer: Much of this encounter note is electronic transcription/translation of spoken language to printed text. The electronictranslation of spoken language may be erroneous, or at times, nonsensical words or phrases may be inadvertently transcribed.  Although I have reviewed the note for such errors, some may still exist.

## 2018-10-24 ENCOUNTER — LAB (OUTPATIENT)
Dept: LAB | Facility: HOSPITAL | Age: 43
End: 2018-10-24

## 2018-10-24 ENCOUNTER — OFFICE VISIT (OUTPATIENT)
Dept: NEUROLOGY | Facility: CLINIC | Age: 43
End: 2018-10-24

## 2018-10-24 VITALS
DIASTOLIC BLOOD PRESSURE: 90 MMHG | SYSTOLIC BLOOD PRESSURE: 144 MMHG | HEART RATE: 100 BPM | WEIGHT: 237 LBS | BODY MASS INDEX: 30.42 KG/M2 | HEIGHT: 74 IN

## 2018-10-24 DIAGNOSIS — R56.9 SEIZURE (HCC): Primary | ICD-10-CM

## 2018-10-24 DIAGNOSIS — Z51.81 THERAPEUTIC DRUG MONITORING: ICD-10-CM

## 2018-10-24 LAB
ALBUMIN SERPL-MCNC: 4.7 G/DL (ref 3.5–5)
ALBUMIN/GLOB SERPL: 1.4 G/DL (ref 1.1–2.5)
ALP SERPL-CCNC: 70 U/L (ref 24–120)
ALT SERPL W P-5'-P-CCNC: 95 U/L (ref 0–54)
ANION GAP SERPL CALCULATED.3IONS-SCNC: 10 MMOL/L (ref 4–13)
AST SERPL-CCNC: 48 U/L (ref 7–45)
BASOPHILS # BLD AUTO: 0.02 10*3/MM3 (ref 0–0.2)
BASOPHILS NFR BLD AUTO: 0.3 % (ref 0–2)
BILIRUB SERPL-MCNC: 0.5 MG/DL (ref 0.1–1)
BUN BLD-MCNC: 21 MG/DL (ref 5–21)
BUN/CREAT SERPL: 14.5 (ref 7–25)
CALCIUM SPEC-SCNC: 10 MG/DL (ref 8.4–10.4)
CHLORIDE SERPL-SCNC: 98 MMOL/L (ref 98–110)
CO2 SERPL-SCNC: 35 MMOL/L (ref 24–31)
CREAT BLD-MCNC: 1.45 MG/DL (ref 0.5–1.4)
DEPRECATED RDW RBC AUTO: 44.8 FL (ref 40–54)
EOSINOPHIL # BLD AUTO: 0.03 10*3/MM3 (ref 0–0.7)
EOSINOPHIL NFR BLD AUTO: 0.5 % (ref 0–4)
ERYTHROCYTE [DISTWIDTH] IN BLOOD BY AUTOMATED COUNT: 13.1 % (ref 12–15)
GFR SERPL CREATININE-BSD FRML MDRD: 64 ML/MIN/1.73
GLOBULIN UR ELPH-MCNC: 3.4 GM/DL
GLUCOSE BLD-MCNC: 86 MG/DL (ref 70–100)
HCT VFR BLD AUTO: 43 % (ref 40–52)
HGB BLD-MCNC: 14.6 G/DL (ref 14–18)
IMM GRANULOCYTES # BLD: 0.02 10*3/MM3 (ref 0–0.03)
IMM GRANULOCYTES NFR BLD: 0.3 % (ref 0–5)
LYMPHOCYTES # BLD AUTO: 1.68 10*3/MM3 (ref 0.72–4.86)
LYMPHOCYTES NFR BLD AUTO: 26.5 % (ref 15–45)
MCH RBC QN AUTO: 31.7 PG (ref 28–32)
MCHC RBC AUTO-ENTMCNC: 34 G/DL (ref 33–36)
MCV RBC AUTO: 93.5 FL (ref 82–95)
MONOCYTES # BLD AUTO: 0.59 10*3/MM3 (ref 0.19–1.3)
MONOCYTES NFR BLD AUTO: 9.3 % (ref 4–12)
NEUTROPHILS # BLD AUTO: 4 10*3/MM3 (ref 1.87–8.4)
NEUTROPHILS NFR BLD AUTO: 63.1 % (ref 39–78)
NRBC BLD MANUAL-RTO: 0 /100 WBC (ref 0–0)
PLATELET # BLD AUTO: 254 10*3/MM3 (ref 130–400)
PMV BLD AUTO: 8.9 FL (ref 6–12)
POTASSIUM BLD-SCNC: 4.7 MMOL/L (ref 3.5–5.3)
PROT SERPL-MCNC: 8.1 G/DL (ref 6.3–8.7)
RBC # BLD AUTO: 4.6 10*6/MM3 (ref 4.8–5.9)
SODIUM BLD-SCNC: 143 MMOL/L (ref 135–145)
VALPROATE SERPL-MCNC: 67.9 MCG/ML (ref 50–100)
WBC NRBC COR # BLD: 6.34 10*3/MM3 (ref 4.8–10.8)

## 2018-10-24 PROCEDURE — 80164 ASSAY DIPROPYLACETIC ACD TOT: CPT | Performed by: CLINICAL NURSE SPECIALIST

## 2018-10-24 PROCEDURE — 99214 OFFICE O/P EST MOD 30 MIN: CPT | Performed by: CLINICAL NURSE SPECIALIST

## 2018-10-24 PROCEDURE — 80053 COMPREHEN METABOLIC PANEL: CPT | Performed by: CLINICAL NURSE SPECIALIST

## 2018-10-24 PROCEDURE — 85025 COMPLETE CBC W/AUTO DIFF WBC: CPT | Performed by: CLINICAL NURSE SPECIALIST

## 2018-10-24 PROCEDURE — 36415 COLL VENOUS BLD VENIPUNCTURE: CPT

## 2018-10-24 PROCEDURE — 99406 BEHAV CHNG SMOKING 3-10 MIN: CPT | Performed by: CLINICAL NURSE SPECIALIST

## 2018-10-24 RX ORDER — CARBAMAZEPINE 200 MG/1
TABLET ORAL
Qty: 90 TABLET | Refills: 2 | Status: SHIPPED | OUTPATIENT
Start: 2018-10-24 | End: 2019-01-07 | Stop reason: SDUPTHER

## 2018-10-24 NOTE — PATIENT INSTRUCTIONS
Once taking tegretol 200 mg 2 tablets two times a day begin wean off Depakote 1 tablet daily for 7 days then stop.       Epilepsy  Epilepsy is a condition in which a person has repeated seizures over time. A seizure is a sudden burst of abnormal electrical and chemical activity in the brain. Seizures can cause a change in attention, behavior, or the ability to remain awake and alert (altered mental status).  Epilepsy increases a person's risk of falls, accidents, and injury. It can also lead to complications, including:  · Depression.  · Poor memory.  · Sudden unexplained death in epilepsy (SUDEP). This complication is rare, and its cause is not known.    Most people with epilepsy lead normal lives.  What are the causes?  This condition may be caused by:  · A head injury.  · An injury that happens at birth.  · A high fever during childhood.  · A stroke.  · Bleeding that goes into or around the brain.  · Certain medicines and drugs.  · Having too little oxygen for a long period of time.  · Abnormal brain development.  · Certain infections, such as meningitis and encephalitis.  · Brain tumors.  · Conditions that are passed along from parent to child (are hereditary).    What are the signs or symptoms?  Symptoms of a seizure vary greatly from person to person. They include:  · Convulsions.  · Stiffening of the body.  · Involuntary movements of the arms or legs.  · Loss of consciousness.  · Breathing problems.  · Falling suddenly.  · Confusion.  · Head nodding.  · Eye blinking or fluttering.  · Lip smacking.  · Drooling.  · Rapid eye movements.  · Grunting.  · Loss of bladder control and bowel control.  · Staring.  · Unresponsiveness.    Some people have symptoms right before a seizure happens (aura) and right after a seizure happens. Symptoms of an aura include:  · Fear or anxiety.  · Nausea.  · Feeling like the room is spinning (vertigo).  · A feeling of having seen or heard something before (celia vu).  · Odd tastes  or smells.  · Changes in vision, such as seeing flashing lights or spots.    Symptoms that follow a seizure include:  · Confusion.  · Sleepiness.  · Headache.    How is this diagnosed?  This condition is diagnosed based on:  · Your symptoms.  · Your medical history.  · A physical exam.  · A neurological exam. A neurological exam is similar to a physical exam. It involves checking your strength, reflexes, coordination, and sensations.  · Tests, such as:  ? An electroencephalogram (EEG). This is a painless test that creates a diagram of your brain waves.  ? An MRI of the brain.  ? A CT scan of the brain.  ? A lumbar puncture, also called a spinal tap.  ? Blood tests to check for signs of infection or abnormal blood chemistry.    How is this treated?  There is no cure for this condition, but treatment can help control seizures. Treatment may involve:  · Taking medicines to control seizures. These include medicines to prevent seizures and medicines to stop seizures as they occur.  · Having a device called a vagus nerve stimulator implanted in the chest. The device sends electrical impulses to the vagus nerve and to the brain to prevent seizures. This treatment may be recommended if medicines do not help.  · Brain surgery. There are several kinds of surgeries that may be done to stop seizures from happening or to reduce how often seizures happen.  · Having regular blood tests. You may need to have blood tests regularly to check that you are getting the right amount of medicine.    Once this condition has been diagnosed, it is important to begin treatment as soon as possible. For some people, epilepsy eventually goes away.  Follow these instructions at home:  Medicines    · Take over-the-counter and prescription medicines only as told by your health care provider.  · Avoid any substances that may prevent your medicine from working properly, such as alcohol.  Activity  · Get enough rest. Lack of sleep can make seizures  more likely to occur.  · Follow instructions from your health care provider about driving, swimming, and doing any other activities that would be dangerous if you had a seizure.  Educating others  Teach friends and family what to do if you have a seizure. They should:  · Lay you on the ground to prevent a fall.  · Cushion your head and body.  · Loosen any tight clothing around your neck.  · Turn you on your side. If vomiting occurs, this helps keep your airway clear.  · Stay with you until you recover.  · Not hold you down. Holding you down will not stop the seizure.  · Not put anything in your mouth.  · Know whether or not you need emergency care.    General instructions  · Avoid anything that has ever triggered a seizure for you.  · Keep a seizure diary. Record what you remember about each seizure, especially anything that might have triggered the seizure.  · Keep all follow-up visits as told by your health care provider. This is important.  Contact a health care provider if:  · Your seizure pattern changes.  · You have symptoms of infection or another illness. This might increase your risk of having a seizure.  Get help right away if:  · You have a seizure that does not stop after 5 minutes.  · You have several seizures in a row without a complete recovery in between seizures.  · You have a seizure that makes it harder to breathe.  · You have a seizure that is different from previous seizures.  · You have a seizure that leaves you unable to speak or use a part of your body.  · You did not wake up immediately after a seizure.  This information is not intended to replace advice given to you by your health care provider. Make sure you discuss any questions you have with your health care provider.  Document Released: 12/18/2006 Document Revised: 07/15/2017 Document Reviewed: 06/27/2017  Elsevier Interactive Patient Education © 2018 [employer]

## 2018-11-02 ENCOUNTER — OFFICE VISIT (OUTPATIENT)
Dept: NEUROLOGY | Facility: CLINIC | Age: 43
End: 2018-11-02

## 2018-11-02 ENCOUNTER — LAB (OUTPATIENT)
Dept: LAB | Facility: HOSPITAL | Age: 43
End: 2018-11-02

## 2018-11-02 VITALS
WEIGHT: 243 LBS | DIASTOLIC BLOOD PRESSURE: 90 MMHG | HEART RATE: 99 BPM | BODY MASS INDEX: 31.18 KG/M2 | HEIGHT: 74 IN | SYSTOLIC BLOOD PRESSURE: 142 MMHG

## 2018-11-02 DIAGNOSIS — Z98.890 HISTORY OF RESECTION OF MENINGIOMA: ICD-10-CM

## 2018-11-02 DIAGNOSIS — Z86.03 HISTORY OF RESECTION OF MENINGIOMA: ICD-10-CM

## 2018-11-02 DIAGNOSIS — G89.29 CHRONIC INTRACTABLE HEADACHE, UNSPECIFIED HEADACHE TYPE: ICD-10-CM

## 2018-11-02 DIAGNOSIS — R51.9 CHRONIC INTRACTABLE HEADACHE, UNSPECIFIED HEADACHE TYPE: ICD-10-CM

## 2018-11-02 DIAGNOSIS — Z51.81 THERAPEUTIC DRUG MONITORING: ICD-10-CM

## 2018-11-02 DIAGNOSIS — R56.9 SEIZURE (HCC): Primary | ICD-10-CM

## 2018-11-02 LAB
ALBUMIN SERPL-MCNC: 4.3 G/DL (ref 3.5–5)
ALBUMIN/GLOB SERPL: 1.3 G/DL (ref 1.1–2.5)
ALP SERPL-CCNC: 98 U/L (ref 24–120)
ALT SERPL W P-5'-P-CCNC: 77 U/L (ref 0–54)
ANION GAP SERPL CALCULATED.3IONS-SCNC: 13 MMOL/L (ref 4–13)
AST SERPL-CCNC: 42 U/L (ref 7–45)
BILIRUB SERPL-MCNC: 0.3 MG/DL (ref 0.1–1)
BUN BLD-MCNC: 15 MG/DL (ref 5–21)
BUN/CREAT SERPL: 12.1 (ref 7–25)
CALCIUM SPEC-SCNC: 9.8 MG/DL (ref 8.4–10.4)
CHLORIDE SERPL-SCNC: 97 MMOL/L (ref 98–110)
CO2 SERPL-SCNC: 30 MMOL/L (ref 24–31)
CREAT BLD-MCNC: 1.24 MG/DL (ref 0.5–1.4)
DEPRECATED RDW RBC AUTO: 45.2 FL (ref 40–54)
ERYTHROCYTE [DISTWIDTH] IN BLOOD BY AUTOMATED COUNT: 13.5 % (ref 12–15)
GFR SERPL CREATININE-BSD FRML MDRD: 77 ML/MIN/1.73
GLOBULIN UR ELPH-MCNC: 3.3 GM/DL
GLUCOSE BLD-MCNC: 97 MG/DL (ref 70–100)
HCT VFR BLD AUTO: 42.8 % (ref 40–52)
HGB BLD-MCNC: 14.7 G/DL (ref 14–18)
MCH RBC QN AUTO: 31.1 PG (ref 28–32)
MCHC RBC AUTO-ENTMCNC: 34.3 G/DL (ref 33–36)
MCV RBC AUTO: 90.7 FL (ref 82–95)
PLATELET # BLD AUTO: 271 10*3/MM3 (ref 130–400)
PMV BLD AUTO: 9.5 FL (ref 6–12)
POTASSIUM BLD-SCNC: 4.4 MMOL/L (ref 3.5–5.3)
PROT SERPL-MCNC: 7.6 G/DL (ref 6.3–8.7)
RBC # BLD AUTO: 4.72 10*6/MM3 (ref 4.8–5.9)
SODIUM BLD-SCNC: 140 MMOL/L (ref 135–145)
VALPROATE SERPL-MCNC: 38.7 MCG/ML (ref 50–100)
WBC NRBC COR # BLD: 5.78 10*3/MM3 (ref 4.8–10.8)

## 2018-11-02 PROCEDURE — 99214 OFFICE O/P EST MOD 30 MIN: CPT | Performed by: CLINICAL NURSE SPECIALIST

## 2018-11-02 PROCEDURE — 80164 ASSAY DIPROPYLACETIC ACD TOT: CPT | Performed by: CLINICAL NURSE SPECIALIST

## 2018-11-02 PROCEDURE — 36415 COLL VENOUS BLD VENIPUNCTURE: CPT

## 2018-11-02 PROCEDURE — 80053 COMPREHEN METABOLIC PANEL: CPT | Performed by: CLINICAL NURSE SPECIALIST

## 2018-11-02 PROCEDURE — 85027 COMPLETE CBC AUTOMATED: CPT | Performed by: CLINICAL NURSE SPECIALIST

## 2018-11-02 RX ORDER — AMITRIPTYLINE HYDROCHLORIDE 75 MG/1
75 TABLET, FILM COATED ORAL NIGHTLY
Qty: 30 TABLET | Refills: 2 | Status: SHIPPED | OUTPATIENT
Start: 2018-11-02 | End: 2019-01-22 | Stop reason: DRUGHIGH

## 2018-11-02 NOTE — PROGRESS NOTES
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 He is here today for  follow up for new onset seizure. He is accompanied by his girl friend who is an nurse on telemetry. Patient is ambulating unassisted. He has history of  bilateral frontal meningioma resections in 2015. At last visit he was transitioned to Tegretol 200 mg BID and is now taking 1 in AM and 2 in PM with plans for 2 tablets BID. He denies side effects or rash. Friend states moods has improved as well. He does complain of a HA and does take elavil 50 mg for dual purpose of HA and sleep. He is requesting this be increased today. He did have labs last visit and AST/ALT were mildly elevated. He states chest pain resolved. He denies AMS, seizure, LOC, tongue biting, bowel/bladder incontinence. Friend states he is much more like himself and back to his baseline.     He has hx of tobacco abuse and HTN.        Patient has history of bilateral frontal meningioma resections in 2015. He reports in 2012 began to have dizziness and would fall down.  He reportedly took Keppra just after surgery and it made him sick.  He stopped it shortly afterwards.  He has had no issues until the several days prior to admission October 15, 2018 .  His girlfriend  assisted in the history.  She tells me that for the  several days prior to admission he was having  episodes of staring off in space and not being responsive.  He went to bed 10/14/18 at 11 PM.  At 3 AM this morning she woke up and found him to have increased tone and rhythmic jerking of the left face, arm, and leg.  This lasted less than 15 seconds but occur approximately 6-10 times.  He had additional seizures in the ambulance on the way to the hospital and several in the ED as well.  These were eventually stopped after multiple rounds of Versed and a load of fosphenytoin.  He did have tongue biting with this but no urinary  incontinence.  He has no previous history of seizures.      Seizures    This is a new problem. Episode onset: last reported 10/21/18. There were more than 10 seizures. The most recent episode lasted 30 to 120 seconds. Associated symptoms include confusion, headaches and chest pain. Pertinent negatives include no speech difficulty, no nausea, no vomiting and no diarrhea. Characteristics include rhythmic jerking (shaking of legs), loss of consciousness and bit tongue. Characteristics do not include bowel incontinence, bladder incontinence or apnea. stare off, not able to talk. confusion The episode was witnessed (girlfriend). The seizures continued in the ED. hx of bilateral frontal meningioma resection 2015        Current Outpatient Prescriptions   Medication Sig Dispense Refill   • carBAMazepine (TEGRETOL) 200 MG tablet Take 1 tablet two times a day for 7 days then 1 tablet AM and 2 tablet PM for 7 days then 2 tablets BID thereafter (Patient taking differently: TAKE 1 IN THE AM AND 2 IN THE PM) 90 tablet 2   • divalproex (DEPAKOTE) 500 MG DR tablet Take 1 tablet by mouth 3 (Three) Times a Day. (Patient taking differently: Take 500 mg by mouth 2 (Two) Times a Day.) 90 tablet 1   • hydrochlorothiazide (HYDRODIURIL) 25 MG tablet Take 25 mg by mouth Daily.     • amitriptyline (ELAVIL) 75 MG tablet Take 1 tablet by mouth Every Night. 30 tablet 2     No current facility-administered medications for this visit.        Past Medical History:   Diagnosis Date   • Drug abuse (CMS/HCC)    • Hypertension        Past Surgical History:   Procedure Laterality Date   • TESTICLE TORSION REPAIR         family history is not on file.    Social History   Substance Use Topics   • Smoking status: Current Every Day Smoker     Packs/day: 1.00   • Smokeless tobacco: Not on file   • Alcohol use Yes      Comment: ocassionally       Review of Systems   Constitutional: Negative.  Negative for fatigue.   HENT: Negative for trouble swallowing and  "voice change.         Hiccups   Eyes: Negative.    Respiratory: Negative.  Negative for apnea and shortness of breath.    Cardiovascular: Positive for chest pain. Negative for leg swelling.   Gastrointestinal: Negative.  Negative for bowel incontinence, constipation, diarrhea, nausea and vomiting.   Endocrine: Negative.    Genitourinary: Negative.  Negative for bladder incontinence, dysuria and frequency.   Musculoskeletal: Negative for arthralgias and gait problem.   Skin: Negative.    Allergic/Immunologic: Negative.    Neurological: Positive for seizures, loss of consciousness and headaches. Negative for dizziness, speech difficulty and weakness.   Hematological: Negative.    Psychiatric/Behavioral: Positive for confusion. Negative for agitation and hallucinations.   All other systems reviewed and are negative.      Objective     /90   Pulse 99   Ht 188 cm (74\")   Wt 110 kg (243 lb)   BMI 31.20 kg/m² , Body mass index is 31.2 kg/m².    Physical Exam   Constitutional: He is oriented to person, place, and time. Vital signs are normal. He appears well-developed and well-nourished. He is cooperative.   HENT:   Head: Normocephalic and atraumatic.   Right Ear: Hearing and external ear normal.   Left Ear: Hearing and external ear normal.   Nose: Nose normal.   Mouth/Throat: Oropharynx is clear and moist.   Eyes: Pupils are equal, round, and reactive to light. Conjunctivae, EOM and lids are normal. Right eye exhibits normal extraocular motion and no nystagmus. Left eye exhibits normal extraocular motion and no nystagmus. Right pupil is round and reactive. Left pupil is round and reactive. Pupils are equal.   Neck: Normal range of motion. Neck supple. Carotid bruit is not present.   Cardiovascular: Normal rate, regular rhythm and normal heart sounds.    No murmur heard.  Pulses:       Dorsalis pedis pulses are 1+ on the right side, and 1+ on the left side.        Posterior tibial pulses are 1+ on the right side, " and 1+ on the left side.   Pulmonary/Chest: Effort normal and breath sounds normal. He has no decreased breath sounds. He has no rhonchi.   Abdominal: Soft. Bowel sounds are normal.   Musculoskeletal: Normal range of motion.     Vascular Status -  His right foot exhibits no edema. His left foot exhibits no edema.  Neurological: He is alert and oriented to person, place, and time. He has normal strength and normal reflexes. He displays no tremor. No cranial nerve deficit or sensory deficit. He exhibits normal muscle tone. He displays a negative Romberg sign. Coordination and gait normal.   Reflex Scores:       Tricep reflexes are 2+ on the right side and 2+ on the left side.       Bicep reflexes are 2+ on the right side and 2+ on the left side.       Brachioradialis reflexes are 2+ on the right side and 2+ on the left side.       Patellar reflexes are 2+ on the right side and 2+ on the left side.       Achilles reflexes are 2+ on the right side and 2+ on the left side.  Awake, alert. No aphasia, no dysarthria  Completes simple and complex commands    CN II:  Visual fields full.  Pupils equally reactive to light  CN III, IV, VI:  Extraocular Muscles full with no signs of nystagmus  CN V:  Facial sensory is symmetric with no asymetries.  CN VII:  Facial motor symmetric  CN VIII:  Gross hearing intact bilaterally  CN IX:  Palate elevates symmetrically  CN X:  Palate elevates symmetrically  CN XI:  Shoulder shrug symmetric  CN XII:  Tongue is midline on protrusion    Full and symmetric strength bilateral upper and lower extremities.   Skin: Skin is warm and dry.   Psychiatric: He has a normal mood and affect. His speech is normal and behavior is normal. Cognition and memory are normal.   Nursing note and vitals reviewed.      Results for orders placed or performed in visit on 10/24/18   Comprehensive Metabolic Panel   Result Value Ref Range    Glucose 86 70 - 100 mg/dL    BUN 21 5 - 21 mg/dL    Creatinine 1.45 (H) 0.50 -  1.40 mg/dL    Sodium 143 135 - 145 mmol/L    Potassium 4.7 3.5 - 5.3 mmol/L    Chloride 98 98 - 110 mmol/L    CO2 35.0 (H) 24.0 - 31.0 mmol/L    Calcium 10.0 8.4 - 10.4 mg/dL    Total Protein 8.1 6.3 - 8.7 g/dL    Albumin 4.70 3.50 - 5.00 g/dL    ALT (SGPT) 95 (H) 0 - 54 U/L    AST (SGOT) 48 (H) 7 - 45 U/L    Alkaline Phosphatase 70 24 - 120 U/L    Total Bilirubin 0.5 0.1 - 1.0 mg/dL    eGFR  African Amer 64 >60 mL/min/1.73    Globulin 3.4 gm/dL    A/G Ratio 1.4 1.1 - 2.5 g/dL    BUN/Creatinine Ratio 14.5 7.0 - 25.0    Anion Gap 10.0 4.0 - 13.0 mmol/L   Valproic Acid Level, Total   Result Value Ref Range    Valproic Acid 67.9 50.0 - 100.0 mcg/mL   CBC Auto Differential   Result Value Ref Range    WBC 6.34 4.80 - 10.80 10*3/mm3    RBC 4.60 (L) 4.80 - 5.90 10*6/mm3    Hemoglobin 14.6 14.0 - 18.0 g/dL    Hematocrit 43.0 40.0 - 52.0 %    MCV 93.5 82.0 - 95.0 fL    MCH 31.7 28.0 - 32.0 pg    MCHC 34.0 33.0 - 36.0 g/dL    RDW 13.1 12.0 - 15.0 %    RDW-SD 44.8 40.0 - 54.0 fl    MPV 8.9 6.0 - 12.0 fL    Platelets 254 130 - 400 10*3/mm3    Neutrophil % 63.1 39.0 - 78.0 %    Lymphocyte % 26.5 15.0 - 45.0 %    Monocyte % 9.3 4.0 - 12.0 %    Eosinophil % 0.5 0.0 - 4.0 %    Basophil % 0.3 0.0 - 2.0 %    Immature Grans % 0.3 0.0 - 5.0 %    Neutrophils, Absolute 4.00 1.87 - 8.40 10*3/mm3    Lymphocytes, Absolute 1.68 0.72 - 4.86 10*3/mm3    Monocytes, Absolute 0.59 0.19 - 1.30 10*3/mm3    Eosinophils, Absolute 0.03 0.00 - 0.70 10*3/mm3    Basophils, Absolute 0.02 0.00 - 0.20 10*3/mm3    Immature Grans, Absolute 0.02 0.00 - 0.03 10*3/mm3    nRBC 0.0 0.0 - 0.0 /100 WBC        ASSESSMENT/PLAN    Diagnoses and all orders for this visit:    Seizure (CMS/HCC)  -     Ambulatory Referral to Neurology    Therapeutic drug monitoring  -     Valproic Acid Level, Total; Future  -     Comprehensive Metabolic Panel; Future  -     CBC (No Diff); Future    History of resection of meningioma  -     Ambulatory Referral to Neurology    Chronic  intractable headache, unspecified headache type    Other orders  -     amitriptyline (ELAVIL) 75 MG tablet; Take 1 tablet by mouth Every Night.      1. Continue  Tegretol  200 mg taper. He is now taking 1 tablet AM and 2 tablet PM .  Counseled on side effects including rash, SJS, hyponatremia  2. Once taking tegretol 200 mg 2 tablets BID begin to wean off Depakote  3. No driving for 90 days from last seizure  4. Counseled on safety measures for seizure related to occupation. No climbing ladders, using sharp tools, shower only.  5. Continues to have episodic HA. Will increase Elavil 75 mg at HS   6. Check CBC, CMP, depakote level today  8. Counseled on risk factors that lower seizure threshold  9. Seizure precautions were discussed to include no tub baths, no swimming, avoiding lack of sleep, and avoiding known triggers. Education given of things that may contribute to a seizure to include, but not limited to: stressful situations, fever, fatigue, lack of sleep, low blood sugar, hyperventilation, flashing lights, and caffeine. Instructions given to take seizure medications as prescribed. Education given to family member on what to do during a seizure and care following the seizure. Education given to contact this office prior to stopping or changing any medications.  10. Patient requesting referral to Dr. Hahn at [place]  for 2nd opinion  11. I advised Chirag of the risks of continuing to use tobacco, and I provided him with tobacco cessation educational materials in the After Visit Summary.     During this visit, I spent 3-10 minutes counseling the patient regarding tobacco cessation.    HPI and ROS reviewed and updated       allergies and all known medications/prescriptions have been reviewed using resources available on this encounter.    Return in about 2 weeks (around 11/16/2018).        Viviane Glasgow, APRN

## 2018-11-15 ENCOUNTER — OFFICE VISIT (OUTPATIENT)
Dept: NEUROLOGY | Facility: CLINIC | Age: 43
End: 2018-11-15

## 2018-11-15 ENCOUNTER — LAB (OUTPATIENT)
Dept: LAB | Facility: HOSPITAL | Age: 43
End: 2018-11-15

## 2018-11-15 VITALS
HEART RATE: 88 BPM | DIASTOLIC BLOOD PRESSURE: 100 MMHG | WEIGHT: 242 LBS | BODY MASS INDEX: 31.06 KG/M2 | SYSTOLIC BLOOD PRESSURE: 140 MMHG | HEIGHT: 74 IN

## 2018-11-15 DIAGNOSIS — R06.02 SOB (SHORTNESS OF BREATH): ICD-10-CM

## 2018-11-15 DIAGNOSIS — Z51.81 THERAPEUTIC DRUG MONITORING: Primary | ICD-10-CM

## 2018-11-15 DIAGNOSIS — Z51.81 THERAPEUTIC DRUG MONITORING: ICD-10-CM

## 2018-11-15 DIAGNOSIS — R40.0 DAYTIME SOMNOLENCE: ICD-10-CM

## 2018-11-15 LAB
ALBUMIN SERPL-MCNC: 4.6 G/DL (ref 3.5–5)
ALBUMIN/GLOB SERPL: 1.2 G/DL (ref 1.1–2.5)
ALP SERPL-CCNC: 130 U/L (ref 24–120)
ALT SERPL W P-5'-P-CCNC: 146 U/L (ref 0–54)
ANION GAP SERPL CALCULATED.3IONS-SCNC: 10 MMOL/L (ref 4–13)
AST SERPL-CCNC: 68 U/L (ref 7–45)
BASOPHILS # BLD AUTO: 0.02 10*3/MM3 (ref 0–0.2)
BASOPHILS NFR BLD AUTO: 0.5 % (ref 0–2)
BILIRUB SERPL-MCNC: 0.3 MG/DL (ref 0.1–1)
BUN BLD-MCNC: 17 MG/DL (ref 5–21)
BUN/CREAT SERPL: 13.3 (ref 7–25)
CALCIUM SPEC-SCNC: 9.6 MG/DL (ref 8.4–10.4)
CARBAMAZEPINE SERPL-MCNC: 12.7 MCG/ML (ref 4–12)
CHLORIDE SERPL-SCNC: 97 MMOL/L (ref 98–110)
CO2 SERPL-SCNC: 34 MMOL/L (ref 24–31)
CREAT BLD-MCNC: 1.28 MG/DL (ref 0.5–1.4)
DEPRECATED RDW RBC AUTO: 45.5 FL (ref 40–54)
EOSINOPHIL # BLD AUTO: 0.05 10*3/MM3 (ref 0–0.7)
EOSINOPHIL NFR BLD AUTO: 1.1 % (ref 0–4)
ERYTHROCYTE [DISTWIDTH] IN BLOOD BY AUTOMATED COUNT: 13.5 % (ref 12–15)
GFR SERPL CREATININE-BSD FRML MDRD: 74 ML/MIN/1.73
GLOBULIN UR ELPH-MCNC: 3.8 GM/DL
GLUCOSE BLD-MCNC: 95 MG/DL (ref 70–100)
HCT VFR BLD AUTO: 41.9 % (ref 40–52)
HGB BLD-MCNC: 14.4 G/DL (ref 14–18)
IMM GRANULOCYTES # BLD: 0.01 10*3/MM3 (ref 0–0.03)
IMM GRANULOCYTES NFR BLD: 0.2 % (ref 0–5)
LYMPHOCYTES # BLD AUTO: 1.28 10*3/MM3 (ref 0.72–4.86)
LYMPHOCYTES NFR BLD AUTO: 29.4 % (ref 15–45)
MCH RBC QN AUTO: 31.2 PG (ref 28–32)
MCHC RBC AUTO-ENTMCNC: 34.4 G/DL (ref 33–36)
MCV RBC AUTO: 90.9 FL (ref 82–95)
MONOCYTES # BLD AUTO: 0.36 10*3/MM3 (ref 0.19–1.3)
MONOCYTES NFR BLD AUTO: 8.3 % (ref 4–12)
NEUTROPHILS # BLD AUTO: 2.64 10*3/MM3 (ref 1.87–8.4)
NEUTROPHILS NFR BLD AUTO: 60.5 % (ref 39–78)
NRBC BLD MANUAL-RTO: 0 /100 WBC (ref 0–0)
PLATELET # BLD AUTO: 280 10*3/MM3 (ref 130–400)
PMV BLD AUTO: 9.2 FL (ref 6–12)
POTASSIUM BLD-SCNC: 4.7 MMOL/L (ref 3.5–5.3)
PROT SERPL-MCNC: 8.4 G/DL (ref 6.3–8.7)
RBC # BLD AUTO: 4.61 10*6/MM3 (ref 4.8–5.9)
SODIUM BLD-SCNC: 141 MMOL/L (ref 135–145)
VALPROATE SERPL-MCNC: 21.7 MCG/ML (ref 50–100)
WBC NRBC COR # BLD: 4.36 10*3/MM3 (ref 4.8–10.8)

## 2018-11-15 PROCEDURE — 36415 COLL VENOUS BLD VENIPUNCTURE: CPT

## 2018-11-15 PROCEDURE — 80053 COMPREHEN METABOLIC PANEL: CPT | Performed by: CLINICAL NURSE SPECIALIST

## 2018-11-15 PROCEDURE — 85025 COMPLETE CBC W/AUTO DIFF WBC: CPT | Performed by: CLINICAL NURSE SPECIALIST

## 2018-11-15 PROCEDURE — 99203 OFFICE O/P NEW LOW 30 MIN: CPT | Performed by: CLINICAL NURSE SPECIALIST

## 2018-11-15 PROCEDURE — 80164 ASSAY DIPROPYLACETIC ACD TOT: CPT | Performed by: CLINICAL NURSE SPECIALIST

## 2018-11-15 PROCEDURE — 80156 ASSAY CARBAMAZEPINE TOTAL: CPT | Performed by: CLINICAL NURSE SPECIALIST

## 2018-11-15 NOTE — PATIENT INSTRUCTIONS
Steps to Quit Smoking  Smoking tobacco can be bad for your health. It can also affect almost every organ in your body. Smoking puts you and people around you at risk for many serious long-lasting (chronic) diseases. Quitting smoking is hard, but it is one of the best things that you can do for your health. It is never too late to quit.  What are the benefits of quitting smoking?  When you quit smoking, you lower your risk for getting serious diseases and conditions. They can include:  · Lung cancer or lung disease.  · Heart disease.  · Stroke.  · Heart attack.  · Not being able to have children (infertility).  · Weak bones (osteoporosis) and broken bones (fractures).    If you have coughing, wheezing, and shortness of breath, those symptoms may get better when you quit. You may also get sick less often. If you are pregnant, quitting smoking can help to lower your chances of having a baby of low birth weight.  What can I do to help me quit smoking?  Talk with your doctor about what can help you quit smoking. Some things you can do (strategies) include:  · Quitting smoking totally, instead of slowly cutting back how much you smoke over a period of time.  · Going to in-person counseling. You are more likely to quit if you go to many counseling sessions.  · Using resources and support systems, such as:  ? Online chats with a counselor.  ? Phone quitlines.  ? Printed self-help materials.  ? Support groups or group counseling.  ? Text messaging programs.  ? Mobile phone apps or applications.  · Taking medicines. Some of these medicines may have nicotine in them. If you are pregnant or breastfeeding, do not take any medicines to quit smoking unless your doctor says it is okay. Talk with your doctor about counseling or other things that can help you.    Talk with your doctor about using more than one strategy at the same time, such as taking medicines while you are also going to in-person counseling. This can help make  quitting easier.  What things can I do to make it easier to quit?  Quitting smoking might feel very hard at first, but there is a lot that you can do to make it easier. Take these steps:  · Talk to your family and friends. Ask them to support and encourage you.  · Call phone quitlines, reach out to support groups, or work with a counselor.  · Ask people who smoke to not smoke around you.  · Avoid places that make you want (trigger) to smoke, such as:  ? Bars.  ? Parties.  ? Smoke-break areas at work.  · Spend time with people who do not smoke.  · Lower the stress in your life. Stress can make you want to smoke. Try these things to help your stress:  ? Getting regular exercise.  ? Deep-breathing exercises.  ? Yoga.  ? Meditating.  ? Doing a body scan. To do this, close your eyes, focus on one area of your body at a time from head to toe, and notice which parts of your body are tense. Try to relax the muscles in those areas.  · Download or buy apps on your mobile phone or tablet that can help you stick to your quit plan. There are many free apps, such as QuitGuide from the CDC (Centers for Disease Control and Prevention). You can find more support from smokefree.gov and other websites.    This information is not intended to replace advice given to you by your health care provider. Make sure you discuss any questions you have with your health care provider.  Document Released: 10/14/2010 Document Revised: 08/15/2017 Document Reviewed: 05/03/2016  [employer] Interactive Patient Education © 2018 [employer]

## 2018-11-15 NOTE — PROGRESS NOTES
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 He is here today for  follow up for new onset seizure. He is accompanied by himself today Patient is ambulating unassisted. He has history of  bilateral frontal meningioma resections in 2015. He is now taking Tegretol 200 mg 2 tablets BID and is weaning off Depakote taking 1 tablet at HS and thinks he has at least 1 week of this.  He denies side effects or rash. He states mood has improved since weaning off depakote. At last visit he was complaining of worsening HA and elavil was increased to 75 mg at HS but patient states he did take for a while but has gone back to 50 mg at HS. HAs are improved. BP is elevated today. States he did take HCTZ but states it is because him and his girlfriend have been arguing. He does have a new complaint of snoring and observed apnea with SOB. He states gets about 4-5 hours sleep each night. Typically goes to bed about MN and rises about 4 am for work. Since last visit he has stopped energy drinks.He did have labs last visit and I have reviewed with the patient. ALT mildly elevated. At last visit patient had requested referral to [facility] and referral is pending.      EPWORTH= 3, STOP-BANG= HIGH  He has hx of tobacco abuse 1 PPD FOR at least 20 years and HTN.          Patient has history of bilateral frontal meningioma resections in 2015. He reports in 2012 began to have dizziness and would fall down.  He reportedly took Keppra just after surgery and it made him sick.  He stopped it shortly afterwards.  He has had no issues until the several days prior to admission October 15, 2018 .  His girlfriend  assisted in the history.  She tells me that for the  several days prior to admission he was having  episodes of staring off in space and not being responsive.  He went to bed 10/14/18 at 11 PM.  At 3 AM this morning she woke up and  found him to have increased tone and rhythmic jerking of the left face, arm, and leg.  This lasted less than 15 seconds but occur approximately 6-10 times.  He had additional seizures in the ambulance on the way to the hospital and several in the ED as well.  These were eventually stopped after multiple rounds of Versed and a load of fosphenytoin.  He did have tongue biting with this but no urinary incontinence.  He has no previous history of seizures.      Seizures    This is a chronic problem. Episode onset: last reported 10/21/18. There were more than 10 seizures. The most recent episode lasted 30 to 120 seconds. Associated symptoms include confusion, headaches and chest pain. Pertinent negatives include no speech difficulty, no nausea, no vomiting and no diarrhea. Characteristics include rhythmic jerking (shaking of legs), loss of consciousness, bit tongue and apnea. Characteristics do not include bowel incontinence or bladder incontinence. stare off, not able to talk. confusion The episode was witnessed (girlfriend). The seizures continued in the ED. hx of bilateral frontal meningioma resection 2015        Current Outpatient Medications   Medication Sig Dispense Refill   • amitriptyline (ELAVIL) 75 MG tablet Take 1 tablet by mouth Every Night. (Patient taking differently: Take 50 mg by mouth Every Night.) 30 tablet 2   • carBAMazepine (TEGRETOL) 200 MG tablet Take 1 tablet two times a day for 7 days then 1 tablet AM and 2 tablet PM for 7 days then 2 tablets BID thereafter (Patient taking differently: Take 400 mg by mouth 2 (Two) Times a Day.) 90 tablet 2   • divalproex (DEPAKOTE) 500 MG DR tablet Take 1 tablet by mouth 3 (Three) Times a Day. (Patient taking differently: Take 500 mg by mouth every night at bedtime.) 90 tablet 1   • hydrochlorothiazide (HYDRODIURIL) 25 MG tablet Take 25 mg by mouth Daily.       No current facility-administered medications for this visit.        Past Medical History:   Diagnosis  "Date   • Drug abuse (CMS/[facility])    • Hypertension        Past Surgical History:   Procedure Laterality Date   • TESTICLE TORSION REPAIR         family history is not on file.    Social History     Tobacco Use   • Smoking status: Current Every Day Smoker     Packs/day: 1.00   Substance Use Topics   • Alcohol use: Yes     Comment: ocassionally   • Drug use: No       Review of Systems   Constitutional: Negative.  Negative for fatigue.   HENT: Negative for trouble swallowing and voice change.         Hiccups   Eyes: Negative.    Respiratory: Positive for apnea and shortness of breath.    Cardiovascular: Positive for chest pain. Negative for leg swelling.   Gastrointestinal: Negative.  Negative for bowel incontinence, constipation, diarrhea, nausea and vomiting.   Endocrine: Negative.    Genitourinary: Negative.  Negative for bladder incontinence, dysuria and frequency.   Musculoskeletal: Negative for arthralgias and gait problem.   Skin: Negative.    Allergic/Immunologic: Negative.    Neurological: Positive for seizures, loss of consciousness and headaches. Negative for dizziness, speech difficulty and weakness.   Hematological: Negative.    Psychiatric/Behavioral: Positive for confusion. Negative for agitation and hallucinations.   All other systems reviewed and are negative.      Objective     /100   Pulse 88   Ht 188 cm (74\")   Wt 110 kg (242 lb)   BMI 31.07 kg/m² , Body mass index is 31.07 kg/m².    Physical Exam   Constitutional: He is oriented to person, place, and time. Vital signs are normal. He appears well-developed and well-nourished. He is cooperative.   HENT:   Head: Normocephalic and atraumatic.   Right Ear: Hearing and external ear normal.   Left Ear: Hearing and external ear normal.   Nose: Nose normal.   Mouth/Throat: Oropharynx is clear and moist.   Eyes: Conjunctivae, EOM and lids are normal. Pupils are equal, round, and reactive to light. Right eye exhibits normal extraocular motion and no " nystagmus. Left eye exhibits normal extraocular motion and no nystagmus. Right pupil is round and reactive. Left pupil is round and reactive. Pupils are equal.   Neck: Normal range of motion. Neck supple. Carotid bruit is not present.   Cardiovascular: Normal rate, regular rhythm and normal heart sounds.   No murmur heard.  Pulses:       Dorsalis pedis pulses are 1+ on the right side, and 1+ on the left side.        Posterior tibial pulses are 1+ on the right side, and 1+ on the left side.   Pulmonary/Chest: Effort normal and breath sounds normal. He has no decreased breath sounds. He has no rhonchi.   Abdominal: Soft. Bowel sounds are normal.   Musculoskeletal: Normal range of motion.     Vascular Status -  His right foot exhibits no edema. His left foot exhibits no edema.  Neurological: He is alert and oriented to person, place, and time. He has normal strength and normal reflexes. He displays no tremor. No cranial nerve deficit or sensory deficit. He exhibits normal muscle tone. He displays a negative Romberg sign. Coordination and gait normal.   Reflex Scores:       Tricep reflexes are 2+ on the right side and 2+ on the left side.       Bicep reflexes are 2+ on the right side and 2+ on the left side.       Brachioradialis reflexes are 2+ on the right side and 2+ on the left side.       Patellar reflexes are 2+ on the right side and 2+ on the left side.       Achilles reflexes are 2+ on the right side and 2+ on the left side.  Awake, alert. No aphasia, no dysarthria  Completes simple and complex commands    CN II:  Visual fields full.  Pupils equally reactive to light  CN III, IV, VI:  Extraocular Muscles full with no signs of nystagmus  CN V:  Facial sensory is symmetric with no asymetries.  CN VII:  Facial motor symmetric  CN VIII:  Gross hearing intact bilaterally  CN IX:  Palate elevates symmetrically  CN X:  Palate elevates symmetrically  CN XI:  Shoulder shrug symmetric  CN XII:  Tongue is midline on  protrusion    Full and symmetric strength bilateral upper and lower extremities.   Skin: Skin is warm and dry.   Psychiatric: He has a normal mood and affect. His speech is normal and behavior is normal. Cognition and memory are normal.   Nursing note and vitals reviewed.      Results for orders placed or performed in visit on 11/02/18   Valproic Acid Level, Total   Result Value Ref Range    Valproic Acid 38.7 (L) 50.0 - 100.0 mcg/mL   Comprehensive Metabolic Panel   Result Value Ref Range    Glucose 97 70 - 100 mg/dL    BUN 15 5 - 21 mg/dL    Creatinine 1.24 0.50 - 1.40 mg/dL    Sodium 140 135 - 145 mmol/L    Potassium 4.4 3.5 - 5.3 mmol/L    Chloride 97 (L) 98 - 110 mmol/L    CO2 30.0 24.0 - 31.0 mmol/L    Calcium 9.8 8.4 - 10.4 mg/dL    Total Protein 7.6 6.3 - 8.7 g/dL    Albumin 4.30 3.50 - 5.00 g/dL    ALT (SGPT) 77 (H) 0 - 54 U/L    AST (SGOT) 42 7 - 45 U/L    Alkaline Phosphatase 98 24 - 120 U/L    Total Bilirubin 0.3 0.1 - 1.0 mg/dL    eGFR  African Amer 77 >60 mL/min/1.73    Globulin 3.3 gm/dL    A/G Ratio 1.3 1.1 - 2.5 g/dL    BUN/Creatinine Ratio 12.1 7.0 - 25.0    Anion Gap 13.0 4.0 - 13.0 mmol/L   CBC (No Diff)   Result Value Ref Range    WBC 5.78 4.80 - 10.80 10*3/mm3    RBC 4.72 (L) 4.80 - 5.90 10*6/mm3    Hemoglobin 14.7 14.0 - 18.0 g/dL    Hematocrit 42.8 40.0 - 52.0 %    MCV 90.7 82.0 - 95.0 fL    MCH 31.1 28.0 - 32.0 pg    MCHC 34.3 33.0 - 36.0 g/dL    RDW 13.5 12.0 - 15.0 %    RDW-SD 45.2 40.0 - 54.0 fl    MPV 9.5 6.0 - 12.0 fL    Platelets 271 130 - 400 10*3/mm3        ASSESSMENT/PLAN        1. Continue  Tegretol  200 mg 2 tablet BID.  Counseled on side effects including rash, SJS, hyponatremia  2. Continue with wean from Depakote  3. No driving for 90 days from last seizure  4. Counseled on safety measures for seizure related to occupation. No climbing ladders, using sharp tools, shower only.  5. continue with Elavil 50 mg at HS  6. Check CBC, CMP, depakote level today, and tegretol level  8.  Counseled on risk factors that lower seizure threshold  9. Seizure precautions were discussed to include no tub baths, no swimming, avoiding lack of sleep, and avoiding known triggers. Education given of things that may contribute to a seizure to include, but not limited to: stressful situations, fever, fatigue, lack of sleep, low blood sugar, hyperventilation, flashing lights, and caffeine. Instructions given to take seizure medications as prescribed. Education given to family member on what to do during a seizure and care following the seizure. Education given to contact this office prior to stopping or changing any medications.  10. Patient requesting referral to Dr. Hahn at [place]  for 2nd opinion  11. I advised Chirag of the risks of continuing to use tobacco, and I provided him with tobacco cessation educational materials in the After Visit Summary.     During this visit, I spent 3-10 minutes counseling the patient regarding tobacco cessation.  12. See if can get home sleep study but if not able may need in lab study. May consider overnight pulse ox.  13. Patient's Body mass index is 31.07 kg/m². BMI is above normal parameters. Recommendations include: no follow-up required.  14. At last visit patient wanted 2nd opinion and referral to Dr. Selma Purdybilt and referral is pending. If does not accept insurance patient would like referral to Dr. Juan Snell.   HPI and ROS reviewed and updated           Diagnoses and all orders for this visit:    Therapeutic drug monitoring  -     Comprehensive Metabolic Panel; Future  -     CBC & Differential; Future  -     Carbamazepine Level, Total; Future  -     Valproic Acid Level, Total; Future    Daytime somnolence  -     Home Sleep Study; Future    SOB (shortness of breath)  -     Home Sleep Study; Future         allergies and all known medications/prescriptions have been reviewed using resources available on this encounter.    Return in about 8 weeks (around  1/10/2019).        Viviane Glasgow, APRN

## 2018-11-16 DIAGNOSIS — R74.8 ELEVATED LIVER ENZYMES: Primary | ICD-10-CM

## 2018-11-16 DIAGNOSIS — Z51.81 THERAPEUTIC DRUG MONITORING: ICD-10-CM

## 2018-11-27 ENCOUNTER — TELEPHONE (OUTPATIENT)
Dept: NEUROLOGY | Facility: CLINIC | Age: 43
End: 2018-11-27

## 2018-11-29 ENCOUNTER — OFFICE VISIT (OUTPATIENT)
Dept: RETAIL CLINIC | Facility: CLINIC | Age: 43
End: 2018-11-29

## 2018-11-29 VITALS
OXYGEN SATURATION: 98 % | DIASTOLIC BLOOD PRESSURE: 94 MMHG | HEART RATE: 94 BPM | TEMPERATURE: 97.2 F | SYSTOLIC BLOOD PRESSURE: 126 MMHG

## 2018-11-29 DIAGNOSIS — R20.2 TINGLING IN EXTREMITIES: ICD-10-CM

## 2018-11-29 DIAGNOSIS — J30.9 ALLERGIC RHINITIS, UNSPECIFIED SEASONALITY, UNSPECIFIED TRIGGER: Primary | ICD-10-CM

## 2018-11-29 LAB
EXPIRATION DATE: NORMAL
INTERNAL CONTROL: NORMAL
Lab: NORMAL
S PYO AG THROAT QL: NEGATIVE

## 2018-11-29 PROCEDURE — 99213 OFFICE O/P EST LOW 20 MIN: CPT | Performed by: NURSE PRACTITIONER

## 2018-11-29 PROCEDURE — 87880 STREP A ASSAY W/OPTIC: CPT | Performed by: NURSE PRACTITIONER

## 2018-11-29 RX ORDER — LORATADINE 10 MG/1
10 TABLET ORAL DAILY
Qty: 30 TABLET | Refills: 0 | Status: SHIPPED | OUTPATIENT
Start: 2018-11-29 | End: 2018-12-29

## 2018-11-29 NOTE — PATIENT INSTRUCTIONS
Increase fluid intake  Warm salt water gargles as needed for sore throat  Do not over suppress cough  Plan follow up with primary doctor to discuss tingling sensation to fingers.   If no improvement over next 2-3 days or symptoms worsen, follow up with PCP      Allergic Rhinitis, Adult  Allergic rhinitis is an allergic reaction that affects the mucous membrane inside the nose. It causes sneezing, a runny or stuffy nose, and the feeling of mucus going down the back of the throat (postnasal drip). Allergic rhinitis can be mild to severe.  There are two types of allergic rhinitis:  · Seasonal. This type is also called hay fever. It happens only during certain seasons.  · Perennial. This type can happen at any time of the year.    What are the causes?  This condition happens when the body's defense system (immune system) responds to certain harmless substances called allergens as though they were germs.   Seasonal allergic rhinitis is triggered by pollen, which can come from grasses, trees, and weeds. Perennial allergic rhinitis may be caused by:  · House dust mites.  · Pet dander.  · Mold spores.    What are the signs or symptoms?  Symptoms of this condition include:  · Sneezing.  · Runny or stuffy nose (nasal congestion).  · Postnasal drip.  · Itchy nose.  · Tearing of the eyes.  · Trouble sleeping.  · Daytime sleepiness.    How is this diagnosed?  This condition may be diagnosed based on:  · Your medical history.  · A physical exam.  · Tests to check for related conditions, such as:  ? Asthma.  ? Pink eye.  ? Ear infection.  ? Upper respiratory infection.  · Tests to find out which allergens trigger your symptoms. These may include skin or blood tests.    How is this treated?  There is no cure for this condition, but treatment can help control symptoms. Treatment may include:  · Taking medicines that block allergy symptoms, such as antihistamines. Medicine may be given as a shot, nasal spray, or pill.  · Avoiding the  allergen.  · Desensitization. This treatment involves getting ongoing shots until your body becomes less sensitive to the allergen. This treatment may be done if other treatments do not help.  · If taking medicine and avoiding the allergen does not work, new, stronger medicines may be prescribed.    Follow these instructions at home:  · Find out what you are allergic to. Common allergens include smoke, dust, and pollen.  · Avoid the things you are allergic to. These are some things you can do to help avoid allergens:  ? Replace carpet with wood, tile, or vinyl courtney. Carpet can trap dander and dust.  ? Do not smoke. Do not allow smoking in your home.  ? Change your heating and air conditioning filter at least once a month.  ? During allergy season:  § Keep windows closed as much as possible.  § Plan outdoor activities when pollen counts are lowest. This is usually during the evening hours.  § When coming indoors, change clothing and shower before sitting on furniture or bedding.  · Take over-the-counter and prescription medicines only as told by your health care provider.  · Keep all follow-up visits as told by your health care provider. This is important.  Contact a health care provider if:  · You have a fever.  · You develop a persistent cough.  · You make whistling sounds when you breathe (you wheeze).  · Your symptoms interfere with your normal daily activities.  Get help right away if:  · You have shortness of breath.  Summary  · This condition can be managed by taking medicines as directed and avoiding allergens.  · Contact your health care provider if you develop a persistent cough or fever.  · During allergy season, keep windows closed as much as possible.  This information is not intended to replace advice given to you by your health care provider. Make sure you discuss any questions you have with your health care provider.  Document Released: 09/12/2002 Document Revised: 01/25/2018 Document Reviewed:  01/25/2018  Elsevier Interactive Patient Education © 2018 [employer]

## 2018-11-29 NOTE — PROGRESS NOTES
Subjective   Chirag Mata is a 43 y.o. male.     URI    This is a new problem. The current episode started yesterday. The problem has been gradually worsening. There has been no fever. Associated symptoms include headaches, rhinorrhea (Clear; thick) and a sore throat. Pertinent negatives include no coughing, diarrhea, nausea or vomiting. Associated symptoms comments: Post nasal drip  . He has tried nothing for the symptoms.   Upper Extremity Issue   Associated symptoms include headaches and a sore throat. Pertinent negatives include no coughing, fever, nausea or vomiting.   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  He reports that he wears gloves and boots but he experiences tingling sensations to his finger tips while working.  He states he can feel it mildly while in office today.  He used to have this issue with his toes but states that when he was wearing steel toed boots, he has switched to warmer boots and this has improved.      The following portions of the patient's history were reviewed and updated as appropriate: allergies, current medications, past family history, past medical history, past social history, past surgical history and problem list.    Review of Systems   Constitutional: Negative for fever.   HENT: Positive for postnasal drip, rhinorrhea (Clear; thick) and sore throat.    Respiratory: Negative for cough.    Gastrointestinal: Negative for diarrhea, nausea and vomiting.   Allergic/Immunologic: Negative for environmental allergies.   Neurological: Positive for headache.       Objective   Physical Exam   Constitutional: He appears well-developed and well-nourished. He does not appear ill. No distress.   HENT:   Right Ear: External ear normal. Tympanic membrane is not erythematous (pink) and not bulging.   Left Ear: External ear normal. Tympanic membrane is not erythematous (pink) and not bulging.   Nose: Right sinus exhibits no  maxillary sinus tenderness and no frontal sinus tenderness. Left sinus exhibits no maxillary sinus tenderness and no frontal sinus tenderness.   Mouth/Throat: Posterior oropharyngeal erythema (mild; inflamed appearance) present. No oropharyngeal exudate.   Neck: Neck supple.   Cardiovascular: Normal rate, regular rhythm and normal heart sounds. Exam reveals no gallop and no friction rub.   No murmur heard.  Pulmonary/Chest: Effort normal and breath sounds normal. No stridor. No respiratory distress. He has no decreased breath sounds. He has no wheezes. He has no rhonchi. He has no rales.   Musculoskeletal:   Skin to finger tips is intact; no discoloration; no blistering.    Lymphadenopathy:     He has no cervical adenopathy.   Neurological: He is alert.   Skin: Skin is warm and dry. He is not diaphoretic.   Psychiatric: He has a normal mood and affect. His behavior is normal.         Assessment/Plan   Chirag was seen today for uri.    Diagnoses and all orders for this visit:    Allergic rhinitis, unspecified seasonality, unspecified trigger  -     POCT rapid strep A    Tingling in extremities    Other orders  -     loratadine (CLARITIN) 10 MG tablet; Take 1 tablet by mouth Daily for 30 days.      Strep negative     Increase fluid intake  Warm salt water gargles as needed for sore throat  Do not over suppress cough  Plan follow up with primary doctor to discuss tingling sensation to fingers.   If no improvement over next 2-3 days or symptoms worsen, follow up with PCP

## 2018-12-07 ENCOUNTER — LAB (OUTPATIENT)
Dept: LAB | Facility: HOSPITAL | Age: 43
End: 2018-12-07

## 2018-12-07 DIAGNOSIS — R74.8 ELEVATED LIVER ENZYMES: ICD-10-CM

## 2018-12-07 DIAGNOSIS — Z51.81 THERAPEUTIC DRUG MONITORING: ICD-10-CM

## 2018-12-07 DIAGNOSIS — R56.9 SEIZURE (HCC): Primary | ICD-10-CM

## 2018-12-07 LAB
ALBUMIN SERPL-MCNC: 4.6 G/DL (ref 3.5–5)
ALBUMIN/GLOB SERPL: 1.3 G/DL (ref 1.1–2.5)
ALP SERPL-CCNC: 83 U/L (ref 24–120)
ALT SERPL W P-5'-P-CCNC: 38 U/L (ref 0–54)
ANION GAP SERPL CALCULATED.3IONS-SCNC: 12 MMOL/L (ref 4–13)
AST SERPL-CCNC: 42 U/L (ref 7–45)
BILIRUB SERPL-MCNC: 0.4 MG/DL (ref 0.1–1)
BUN BLD-MCNC: 10 MG/DL (ref 5–21)
BUN/CREAT SERPL: 5.5 (ref 7–25)
CALCIUM SPEC-SCNC: 9.4 MG/DL (ref 8.4–10.4)
CARBAMAZEPINE SERPL-MCNC: 8 MCG/ML (ref 4–12)
CHLORIDE SERPL-SCNC: 91 MMOL/L (ref 98–110)
CO2 SERPL-SCNC: 31 MMOL/L (ref 24–31)
CREAT BLD-MCNC: 1.81 MG/DL (ref 0.5–1.4)
GFR SERPL CREATININE-BSD FRML MDRD: 50 ML/MIN/1.73
GLOBULIN UR ELPH-MCNC: 3.6 GM/DL
GLUCOSE BLD-MCNC: 89 MG/DL (ref 70–100)
POTASSIUM BLD-SCNC: 3.9 MMOL/L (ref 3.5–5.3)
PROT SERPL-MCNC: 8.2 G/DL (ref 6.3–8.7)
SODIUM BLD-SCNC: 134 MMOL/L (ref 135–145)
VALPROATE SERPL-MCNC: <10 MCG/ML (ref 50–100)

## 2018-12-07 PROCEDURE — 36415 COLL VENOUS BLD VENIPUNCTURE: CPT

## 2018-12-07 PROCEDURE — 80164 ASSAY DIPROPYLACETIC ACD TOT: CPT | Performed by: CLINICAL NURSE SPECIALIST

## 2018-12-07 PROCEDURE — 80053 COMPREHEN METABOLIC PANEL: CPT | Performed by: CLINICAL NURSE SPECIALIST

## 2018-12-07 PROCEDURE — 80156 ASSAY CARBAMAZEPINE TOTAL: CPT | Performed by: CLINICAL NURSE SPECIALIST

## 2018-12-10 DIAGNOSIS — Z51.81 THERAPEUTIC DRUG MONITORING: Primary | ICD-10-CM

## 2019-01-03 ENCOUNTER — HOSPITAL ENCOUNTER (OUTPATIENT)
Dept: SLEEP MEDICINE | Facility: HOSPITAL | Age: 44
Discharge: HOME | End: 2019-01-03
Admitting: CLINICAL NURSE SPECIALIST

## 2019-01-03 DIAGNOSIS — R06.02 SOB (SHORTNESS OF BREATH): ICD-10-CM

## 2019-01-03 DIAGNOSIS — R40.0 DAYTIME SOMNOLENCE: ICD-10-CM

## 2019-01-03 PROCEDURE — 95806 SLEEP STUDY UNATT&RESP EFFT: CPT | Performed by: PSYCHIATRY & NEUROLOGY

## 2019-01-03 PROCEDURE — 95806 SLEEP STUDY UNATT&RESP EFFT: CPT

## 2019-01-07 RX ORDER — CARBAMAZEPINE 200 MG/1
400 TABLET ORAL 2 TIMES DAILY
Qty: 120 TABLET | Refills: 5 | Status: SHIPPED | OUTPATIENT
Start: 2019-01-07 | End: 2019-05-07 | Stop reason: SDUPTHER

## 2019-01-10 ENCOUNTER — LAB (OUTPATIENT)
Dept: LAB | Facility: HOSPITAL | Age: 44
End: 2019-01-10

## 2019-01-10 DIAGNOSIS — G47.33 OSA (OBSTRUCTIVE SLEEP APNEA): Primary | ICD-10-CM

## 2019-01-10 DIAGNOSIS — Z51.81 THERAPEUTIC DRUG MONITORING: ICD-10-CM

## 2019-01-10 LAB
ALBUMIN SERPL-MCNC: 4.6 G/DL (ref 3.5–5)
ALBUMIN/GLOB SERPL: 1.2 G/DL (ref 1.1–2.5)
ALP SERPL-CCNC: 85 U/L (ref 24–120)
ALT SERPL W P-5'-P-CCNC: 33 U/L (ref 0–54)
ANION GAP SERPL CALCULATED.3IONS-SCNC: 10 MMOL/L (ref 4–13)
AST SERPL-CCNC: 35 U/L (ref 7–45)
BASOPHILS # BLD AUTO: 0.04 10*3/MM3 (ref 0–0.2)
BASOPHILS NFR BLD AUTO: 0.9 % (ref 0–2)
BILIRUB SERPL-MCNC: 0.3 MG/DL (ref 0.1–1)
BUN BLD-MCNC: 15 MG/DL (ref 5–21)
BUN/CREAT SERPL: 11.1 (ref 7–25)
CALCIUM SPEC-SCNC: 9.4 MG/DL (ref 8.4–10.4)
CARBAMAZEPINE SERPL-MCNC: 9.2 MCG/ML (ref 4–12)
CHLORIDE SERPL-SCNC: 96 MMOL/L (ref 98–110)
CO2 SERPL-SCNC: 31 MMOL/L (ref 24–31)
CREAT BLD-MCNC: 1.35 MG/DL (ref 0.5–1.4)
DEPRECATED RDW RBC AUTO: 43.8 FL (ref 40–54)
EOSINOPHIL # BLD AUTO: 0.05 10*3/MM3 (ref 0–0.7)
EOSINOPHIL NFR BLD AUTO: 1.1 % (ref 0–4)
ERYTHROCYTE [DISTWIDTH] IN BLOOD BY AUTOMATED COUNT: 13.3 % (ref 12–15)
GFR SERPL CREATININE-BSD FRML MDRD: 70 ML/MIN/1.73
GLOBULIN UR ELPH-MCNC: 3.7 GM/DL
GLUCOSE BLD-MCNC: 95 MG/DL (ref 70–100)
HCT VFR BLD AUTO: 42.9 % (ref 40–52)
HGB BLD-MCNC: 14.7 G/DL (ref 14–18)
IMM GRANULOCYTES # BLD AUTO: 0.01 10*3/MM3 (ref 0–0.03)
IMM GRANULOCYTES NFR BLD AUTO: 0.2 % (ref 0–5)
LYMPHOCYTES # BLD AUTO: 1.49 10*3/MM3 (ref 0.72–4.86)
LYMPHOCYTES NFR BLD AUTO: 33.2 % (ref 15–45)
MCH RBC QN AUTO: 30.8 PG (ref 28–32)
MCHC RBC AUTO-ENTMCNC: 34.3 G/DL (ref 33–36)
MCV RBC AUTO: 89.7 FL (ref 82–95)
MONOCYTES # BLD AUTO: 0.37 10*3/MM3 (ref 0.19–1.3)
MONOCYTES NFR BLD AUTO: 8.2 % (ref 4–12)
NEUTROPHILS # BLD AUTO: 2.53 10*3/MM3 (ref 1.87–8.4)
NEUTROPHILS NFR BLD AUTO: 56.4 % (ref 39–78)
NRBC BLD AUTO-RTO: 0 /100 WBC (ref 0–0)
PLATELET # BLD AUTO: 334 10*3/MM3 (ref 130–400)
PMV BLD AUTO: 8.5 FL (ref 6–12)
POTASSIUM BLD-SCNC: 4 MMOL/L (ref 3.5–5.3)
PROT SERPL-MCNC: 8.3 G/DL (ref 6.3–8.7)
RBC # BLD AUTO: 4.78 10*6/MM3 (ref 4.8–5.9)
SODIUM BLD-SCNC: 137 MMOL/L (ref 135–145)
WBC NRBC COR # BLD: 4.49 10*3/MM3 (ref 4.8–10.8)

## 2019-01-10 PROCEDURE — 36415 COLL VENOUS BLD VENIPUNCTURE: CPT

## 2019-01-10 PROCEDURE — 80156 ASSAY CARBAMAZEPINE TOTAL: CPT | Performed by: CLINICAL NURSE SPECIALIST

## 2019-01-10 PROCEDURE — 80053 COMPREHEN METABOLIC PANEL: CPT | Performed by: CLINICAL NURSE SPECIALIST

## 2019-01-10 PROCEDURE — 85025 COMPLETE CBC W/AUTO DIFF WBC: CPT | Performed by: CLINICAL NURSE SPECIALIST

## 2019-01-11 ENCOUNTER — TRANSCRIBE ORDERS (OUTPATIENT)
Dept: SLEEP MEDICINE | Facility: HOSPITAL | Age: 44
End: 2019-01-11

## 2019-01-11 DIAGNOSIS — G47.33 OBSTRUCTIVE SLEEP APNEA, ADULT: Primary | ICD-10-CM

## 2019-01-18 ENCOUNTER — OFFICE VISIT (OUTPATIENT)
Dept: NEUROLOGY | Facility: CLINIC | Age: 44
End: 2019-01-18

## 2019-01-18 VITALS
DIASTOLIC BLOOD PRESSURE: 90 MMHG | HEIGHT: 74 IN | WEIGHT: 237 LBS | BODY MASS INDEX: 30.42 KG/M2 | HEART RATE: 86 BPM | SYSTOLIC BLOOD PRESSURE: 140 MMHG

## 2019-01-18 DIAGNOSIS — G89.29 CHRONIC INTRACTABLE HEADACHE, UNSPECIFIED HEADACHE TYPE: ICD-10-CM

## 2019-01-18 DIAGNOSIS — R51.9 CHRONIC INTRACTABLE HEADACHE, UNSPECIFIED HEADACHE TYPE: ICD-10-CM

## 2019-01-18 DIAGNOSIS — R56.9 SEIZURE (HCC): ICD-10-CM

## 2019-01-18 DIAGNOSIS — Z86.03 HISTORY OF RESECTION OF MENINGIOMA: ICD-10-CM

## 2019-01-18 DIAGNOSIS — Z51.81 THERAPEUTIC DRUG MONITORING: Primary | ICD-10-CM

## 2019-01-18 DIAGNOSIS — Z98.890 HISTORY OF RESECTION OF MENINGIOMA: ICD-10-CM

## 2019-01-18 PROCEDURE — 99406 BEHAV CHNG SMOKING 3-10 MIN: CPT | Performed by: CLINICAL NURSE SPECIALIST

## 2019-01-18 PROCEDURE — 99213 OFFICE O/P EST LOW 20 MIN: CPT | Performed by: CLINICAL NURSE SPECIALIST

## 2019-01-18 RX ORDER — CARBAMAZEPINE 100 MG/1
100 TABLET, CHEWABLE ORAL 2 TIMES DAILY
Qty: 60 TABLET | Refills: 2 | Status: SHIPPED | OUTPATIENT
Start: 2019-01-18 | End: 2019-04-05

## 2019-01-18 NOTE — PROGRESS NOTES
Subjective     Chief Complaint   Patient presents with   • Seizures         Chirag Mata is a 43 y.o. 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 He tells me this job does not require him to use sharp tools or use heavy equipment or climb ladders.  He is here today for  follow up for seizure. He is accompanied by his girl friend, Carmen.   Patient is ambulating unassisted. He has history of  bilateral frontal meningioma resections in 2015. He is now taking Tegretol 200 mg 2 tablets BID. He was able to wean and discontinue Depakote as he was having side effects and elevated AST/ALT. He did have follow up labs 1/10/19 and I have reviewed results with patient.   He denies side effects or rash. He does take Elavil 25 mg for chronic HA. This has been decreased at some point from 75 mg nightly. .  BP is elevated today. States he did take HCTZ. He is to f/u with PCP.  At last visit was complaining of snoring with observed apnea with SOB. He states gets about 4-5 hours sleep each night. He did have home sleep study that did show MARIA LUISA. He is scheduled for polysomnogram titration 3/2019.     He reports that in the last 1-2 months has had coughing spells with one he had syncopal episode and was found in the drive way. Other spells occur almost daily and will have mild staring and mild hand tremor. Most time episodes are followed by mild confusion.  At previous visit patient had requested referral for 2nd opinion and has upcoming appointment 2/26/19.     EPWORTH= 3, STOP-BANG= HIGH  He has hx of tobacco abuse 1 PPD FOR at least 20 years and HTN.           Patient has history of bilateral frontal meningioma resections in 2015. He reports in 2012 began to have dizziness and would fall down.  He reportedly took Keppra just after surgery and it made him sick.  He stopped it shortly afterwards.  He has had no issues until the several days prior to admission October 15, 2018 .  His  girlfriend  assisted in the history.  She tells me that for the  several days prior to admission he was having  episodes of staring off in space and not being responsive.  He went to bed 10/14/18 at 11 PM.  At 3 AM this morning she woke up and found him to have increased tone and rhythmic jerking of the left face, arm, and leg.  This lasted less than 15 seconds but occur approximately 6-10 times.  He had additional seizures in the ambulance on the way to the hospital and several in the ED as well.  These were eventually stopped after multiple rounds of Versed and a load of fosphenytoin.  He did have tongue biting with this but no urinary incontinence.  He has no previous history of seizures.      Seizures    This is a chronic problem. Episode onset: last reported 10/21/18. There were more than 10 seizures. The most recent episode lasted 30 to 120 seconds. Associated symptoms include confusion, headaches, chest pain and cough. Pertinent negatives include no speech difficulty, no nausea, no vomiting and no diarrhea. Characteristics include rhythmic jerking (shaking of legs), loss of consciousness, bit tongue and apnea. Characteristics do not include bowel incontinence or bladder incontinence. stare off, not able to talk. confusion The episode was witnessed (girlfriend). The seizures continued in the ED. hx of bilateral frontal meningioma resection 2015        Current Outpatient Medications   Medication Sig Dispense Refill   • amitriptyline (ELAVIL) 75 MG tablet Take 1 tablet by mouth Every Night. (Patient taking differently: Take 25 mg by mouth Every Night.) 30 tablet 2   • carBAMazepine (TEGRETOL) 200 MG tablet Take 2 tablets by mouth 2 (Two) Times a Day. 120 tablet 5   • hydrochlorothiazide (HYDRODIURIL) 25 MG tablet Take 25 mg by mouth Daily.     • carBAMazepine (TEGretol) 100 MG chewable tablet Chew 1 tablet 2 (Two) Times a Day. 60 tablet 2     No current facility-administered medications for this visit.   "      Past Medical History:   Diagnosis Date   • Drug abuse (CMS/[facility])    • Hypertension        Past Surgical History:   Procedure Laterality Date   • TESTICLE TORSION REPAIR         family history is not on file.    Social History     Tobacco Use   • Smoking status: Current Every Day Smoker     Packs/day: 1.00   Substance Use Topics   • Alcohol use: Yes     Comment: ocassionally   • Drug use: No       Review of Systems   Constitutional: Negative.  Negative for fatigue.   HENT: Negative for trouble swallowing and voice change.         Hiccups   Eyes: Negative.    Respiratory: Positive for apnea, cough and shortness of breath.    Cardiovascular: Positive for chest pain. Negative for leg swelling.   Gastrointestinal: Negative.  Negative for bowel incontinence, constipation, diarrhea, nausea and vomiting.   Endocrine: Negative.  Negative for cold intolerance and heat intolerance.   Genitourinary: Negative.  Negative for bladder incontinence, dysuria and frequency.   Musculoskeletal: Negative for arthralgias and gait problem.   Skin: Negative.    Allergic/Immunologic: Negative.    Neurological: Positive for seizures, loss of consciousness and headaches. Negative for dizziness, speech difficulty and weakness.   Hematological: Negative.    Psychiatric/Behavioral: Positive for confusion. Negative for agitation and hallucinations.   All other systems reviewed and are negative.      Objective     /90   Pulse 86   Ht 188 cm (74\")   Wt 108 kg (237 lb)   BMI 30.43 kg/m² , Body mass index is 30.43 kg/m².    Physical Exam   Constitutional: He is oriented to person, place, and time. Vital signs are normal. He appears well-developed and well-nourished. He is cooperative.   HENT:   Head: Normocephalic and atraumatic.   Right Ear: Hearing and external ear normal.   Left Ear: Hearing and external ear normal.   Nose: Nose normal.   Mouth/Throat: Oropharynx is clear and moist.   Eyes: Conjunctivae, EOM and lids are normal. " Pupils are equal, round, and reactive to light. Right eye exhibits normal extraocular motion and no nystagmus. Left eye exhibits normal extraocular motion and no nystagmus. Right pupil is round and reactive. Left pupil is round and reactive. Pupils are equal.   Neck: Normal range of motion. Neck supple. Carotid bruit is not present.   Cardiovascular: Normal rate, regular rhythm and normal heart sounds.   No murmur heard.  Pulses:       Dorsalis pedis pulses are 1+ on the right side, and 1+ on the left side.        Posterior tibial pulses are 1+ on the right side, and 1+ on the left side.   Pulmonary/Chest: Effort normal and breath sounds normal. He has no decreased breath sounds. He has no rhonchi.   Abdominal: Soft. Bowel sounds are normal.   Musculoskeletal: Normal range of motion.     Vascular Status -  His right foot exhibits no edema. His left foot exhibits no edema.  Neurological: He is alert and oriented to person, place, and time. He has normal strength and normal reflexes. He displays no tremor. No cranial nerve deficit or sensory deficit. He exhibits normal muscle tone. He displays a negative Romberg sign. Coordination and gait normal.   Reflex Scores:       Tricep reflexes are 2+ on the right side and 2+ on the left side.       Bicep reflexes are 2+ on the right side and 2+ on the left side.       Brachioradialis reflexes are 2+ on the right side and 2+ on the left side.       Patellar reflexes are 2+ on the right side and 2+ on the left side.       Achilles reflexes are 2+ on the right side and 2+ on the left side.  Awake, alert. No aphasia, no dysarthria  Completes simple and complex commands    CN II:  Visual fields full.  Pupils equally reactive to light  CN III, IV, VI:  Extraocular Muscles full with no signs of nystagmus  CN V:  Facial sensory is symmetric with no asymetries.  CN VII:  Facial motor symmetric  CN VIII:  Gross hearing intact bilaterally  CN IX:  Palate elevates symmetrically  CN X:   Palate elevates symmetrically  CN XI:  Shoulder shrug symmetric  CN XII:  Tongue is midline on protrusion    Full and symmetric strength bilateral upper and lower extremities.   Skin: Skin is warm and dry.   Psychiatric: He has a normal mood and affect. His speech is normal and behavior is normal. Cognition and memory are normal.   Nursing note and vitals reviewed.      Results for orders placed or performed in visit on 01/10/19   Comprehensive Metabolic Panel   Result Value Ref Range    Glucose 95 70 - 100 mg/dL    BUN 15 5 - 21 mg/dL    Creatinine 1.35 0.50 - 1.40 mg/dL    Sodium 137 135 - 145 mmol/L    Potassium 4.0 3.5 - 5.3 mmol/L    Chloride 96 (L) 98 - 110 mmol/L    CO2 31.0 24.0 - 31.0 mmol/L    Calcium 9.4 8.4 - 10.4 mg/dL    Total Protein 8.3 6.3 - 8.7 g/dL    Albumin 4.60 3.50 - 5.00 g/dL    ALT (SGPT) 33 0 - 54 U/L    AST (SGOT) 35 7 - 45 U/L    Alkaline Phosphatase 85 24 - 120 U/L    Total Bilirubin 0.3 0.1 - 1.0 mg/dL    eGFR  African Amer 70 >60 mL/min/1.73    Globulin 3.7 gm/dL    A/G Ratio 1.2 1.1 - 2.5 g/dL    BUN/Creatinine Ratio 11.1 7.0 - 25.0    Anion Gap 10.0 4.0 - 13.0 mmol/L   Carbamazepine Level, Total   Result Value Ref Range    Carbamazepine Level 9.2 4.0 - 12.0 mcg/mL   CBC Auto Differential   Result Value Ref Range    WBC 4.49 (L) 4.80 - 10.80 10*3/mm3    RBC 4.78 (L) 4.80 - 5.90 10*6/mm3    Hemoglobin 14.7 14.0 - 18.0 g/dL    Hematocrit 42.9 40.0 - 52.0 %    MCV 89.7 82.0 - 95.0 fL    MCH 30.8 28.0 - 32.0 pg    MCHC 34.3 33.0 - 36.0 g/dL    RDW 13.3 12.0 - 15.0 %    RDW-SD 43.8 40.0 - 54.0 fl    MPV 8.5 6.0 - 12.0 fL    Platelets 334 130 - 400 10*3/mm3    Neutrophil % 56.4 39.0 - 78.0 %    Lymphocyte % 33.2 15.0 - 45.0 %    Monocyte % 8.2 4.0 - 12.0 %    Eosinophil % 1.1 0.0 - 4.0 %    Basophil % 0.9 0.0 - 2.0 %    Immature Grans % 0.2 0.0 - 5.0 %    Neutrophils, Absolute 2.53 1.87 - 8.40 10*3/mm3    Lymphocytes, Absolute 1.49 0.72 - 4.86 10*3/mm3    Monocytes, Absolute 0.37 0.19 -  1.30 10*3/mm3    Eosinophils, Absolute 0.05 0.00 - 0.70 10*3/mm3    Basophils, Absolute 0.04 0.00 - 0.20 10*3/mm3    Immature Grans, Absolute 0.01 0.00 - 0.03 10*3/mm3    nRBC 0.0 0.0 - 0.0 /100 WBC      Home sleep study: FINDINGS ON STUDY:  Patient underwent a 1 night home sleep test using the [employer] device.  By behavioral criteria patient slept for approximately 5.2 hours with sleep latency is 6 minutes and sleep efficiency 74%.  AHI 21 and RDI 32.  Patient had 0.6% time below 90% oxygen saturation with low SpO2 of 78.6%.  Snoring was noted 42.4% of the time with 41.9% being very loud.  Patient was supine percent of the night.  Mean pulse rate 96 bpm.     IMPRESSION:    Axis A 1: Obstructive sleep apnea G 47.33  Axis B 1: CPAP or BiPAP titration with O2 protocol as indicated and in  attended study is preferred.  Axis C: Underlying medical problems and medication effects may be contributory.  Weight loss for may be helpful if clinically indicated.  Close follow-up patient's family physician and neurologist and emphasis on safety to be accomplished.           ASSESSMENT/PLAN    Diagnoses and all orders for this visit:    Therapeutic drug monitoring  -     Comprehensive Metabolic Panel; Future  -     Carbamazepine Level, Total; Future    Seizure (CMS/HCC)    Chronic intractable headache, unspecified headache type    History of resection of meningioma    Other orders  -     carBAMazepine (TEGretol) 100 MG chewable tablet; Chew 1 tablet 2 (Two) Times a Day.    1. Patient now having episodes of coughing followed by staring and at least one episode of LOC. Staring episodes accompanied by hand tremor. willContinue  Tegretol  200 mg 2 tablet BID and add 100 mg BID for total of 500 mg BID.  Counseled on side effects including rash, SJS, hyponatremia  2. patient scheduled for PSG 3/2019  3. No driving for 90 days from last seizure  4. Counseled on safety measures for seizure related to occupation. No  climbing ladders, using sharp tools, shower only.  5. continue with Elavil 25 mg at HS  6. Check CBC, CMP,  and tegretol level in 2 weeks  8. Counseled on risk factors that lower seizure threshold  9. Seizure precautions were discussed to include no tub baths, no swimming, avoiding lack of sleep, and avoiding known triggers. Education given of things that may contribute to a seizure to include, but not limited to: stressful situations, fever, fatigue, lack of sleep, low blood sugar, hyperventilation, flashing lights, and caffeine. Instructions given to take seizure medications as prescribed. Education given to family member on what to do during a seizure and care following the seizure. Education given to contact this office prior to stopping or changing any medications.  10. Patient requesting referral to Dr. Hahn at [facility]  for 2nd opinion  11. I advised Chirag of the risks of continuing to use tobacco, and I provided him with tobacco cessation educational materials in the After Visit Summary.     During this visit, I spent 3-10 minutes counseling the patient regarding tobacco cessation.  12.  Patient's Body mass index is 30.43 kg/m². BMI is above normal parameters. Recommendations include: educational material.  14. At previous  visit patient wanted 2nd opinion and patient has appointment with Dr. Juan Snell 2/26/19.  15. Plan to repeat MRI brain 10/2019 for surveillance    HPI and ROS reviewed and updated.      allergies and all known medications/prescriptions have been reviewed using resources available on this encounter.    Return in about 2 months (around 3/18/2019).        ROBERT Triplett

## 2019-01-18 NOTE — PATIENT INSTRUCTIONS
Steps to Quit Smoking  Smoking tobacco can be bad for your health. It can also affect almost every organ in your body. Smoking puts you and people around you at risk for many serious long-lasting (chronic) diseases. Quitting smoking is hard, but it is one of the best things that you can do for your health. It is never too late to quit.  What are the benefits of quitting smoking?  When you quit smoking, you lower your risk for getting serious diseases and conditions. They can include:  · Lung cancer or lung disease.  · Heart disease.  · Stroke.  · Heart attack.  · Not being able to have children (infertility).  · Weak bones (osteoporosis) and broken bones (fractures).    If you have coughing, wheezing, and shortness of breath, those symptoms may get better when you quit. You may also get sick less often. If you are pregnant, quitting smoking can help to lower your chances of having a baby of low birth weight.  What can I do to help me quit smoking?  Talk with your doctor about what can help you quit smoking. Some things you can do (strategies) include:  · Quitting smoking totally, instead of slowly cutting back how much you smoke over a period of time.  · Going to in-person counseling. You are more likely to quit if you go to many counseling sessions.  · Using resources and support systems, such as:  ? Online chats with a counselor.  ? Phone quitlines.  ? Printed self-help materials.  ? Support groups or group counseling.  ? Text messaging programs.  ? Mobile phone apps or applications.  · Taking medicines. Some of these medicines may have nicotine in them. If you are pregnant or breastfeeding, do not take any medicines to quit smoking unless your doctor says it is okay. Talk with your doctor about counseling or other things that can help you.    Talk with your doctor about using more than one strategy at the same time, such as taking medicines while you are also going to in-person counseling. This can help make  quitting easier.  What things can I do to make it easier to quit?  Quitting smoking might feel very hard at first, but there is a lot that you can do to make it easier. Take these steps:  · Talk to your family and friends. Ask them to support and encourage you.  · Call phone quitlines, reach out to support groups, or work with a counselor.  · Ask people who smoke to not smoke around you.  · Avoid places that make you want (trigger) to smoke, such as:  ? Bars.  ? Parties.  ? Smoke-break areas at work.  · Spend time with people who do not smoke.  · Lower the stress in your life. Stress can make you want to smoke. Try these things to help your stress:  ? Getting regular exercise.  ? Deep-breathing exercises.  ? Yoga.  ? Meditating.  ? Doing a body scan. To do this, close your eyes, focus on one area of your body at a time from head to toe, and notice which parts of your body are tense. Try to relax the muscles in those areas.  · Download or buy apps on your mobile phone or tablet that can help you stick to your quit plan. There are many free apps, such as QuitGuide from the CDC (Centers for Disease Control and Prevention). You can find more support from smokefree.gov and other websites.    This information is not intended to replace advice given to you by your health care provider. Make sure you discuss any questions you have with your health care provider.  Document Released: 10/14/2010 Document Revised: 08/15/2017 Document Reviewed: 05/03/2016  [employer] Interactive Patient Education © 2018 [employer]  Epilepsy  Epilepsy is a condition in which a person has repeated seizures over time. A seizure is a sudden burst of abnormal electrical and chemical activity in the brain. Seizures can cause a change in attention, behavior, or the ability to remain awake and alert (altered mental status).  Epilepsy increases a person's risk of falls, accidents, and injury. It can also lead to complications,  including:  · Depression.  · Poor memory.  · Sudden unexplained death in epilepsy (SUDEP). This complication is rare, and its cause is not known.    Most people with epilepsy lead normal lives.  What are the causes?  This condition may be caused by:  · A head injury.  · An injury that happens at birth.  · A high fever during childhood.  · A stroke.  · Bleeding that goes into or around the brain.  · Certain medicines and drugs.  · Having too little oxygen for a long period of time.  · Abnormal brain development.  · Certain infections, such as meningitis and encephalitis.  · Brain tumors.  · Conditions that are passed along from parent to child (are hereditary).    What are the signs or symptoms?  Symptoms of a seizure vary greatly from person to person. They include:  · Convulsions.  · Stiffening of the body.  · Involuntary movements of the arms or legs.  · Loss of consciousness.  · Breathing problems.  · Falling suddenly.  · Confusion.  · Head nodding.  · Eye blinking or fluttering.  · Lip smacking.  · Drooling.  · Rapid eye movements.  · Grunting.  · Loss of bladder control and bowel control.  · Staring.  · Unresponsiveness.    Some people have symptoms right before a seizure happens (aura) and right after a seizure happens. Symptoms of an aura include:  · Fear or anxiety.  · Nausea.  · Feeling like the room is spinning (vertigo).  · A feeling of having seen or heard something before (celia vu).  · Odd tastes or smells.  · Changes in vision, such as seeing flashing lights or spots.    Symptoms that follow a seizure include:  · Confusion.  · Sleepiness.  · Headache.    How is this diagnosed?  This condition is diagnosed based on:  · Your symptoms.  · Your medical history.  · A physical exam.  · A neurological exam. A neurological exam is similar to a physical exam. It involves checking your strength, reflexes, coordination, and sensations.  · Tests, such as:  ? An electroencephalogram (EEG). This is a painless test  that creates a diagram of your brain waves.  ? An MRI of the brain.  ? A CT scan of the brain.  ? A lumbar puncture, also called a spinal tap.  ? Blood tests to check for signs of infection or abnormal blood chemistry.    How is this treated?  There is no cure for this condition, but treatment can help control seizures. Treatment may involve:  · Taking medicines to control seizures. These include medicines to prevent seizures and medicines to stop seizures as they occur.  · Having a device called a vagus nerve stimulator implanted in the chest. The device sends electrical impulses to the vagus nerve and to the brain to prevent seizures. This treatment may be recommended if medicines do not help.  · Brain surgery. There are several kinds of surgeries that may be done to stop seizures from happening or to reduce how often seizures happen.  · Having regular blood tests. You may need to have blood tests regularly to check that you are getting the right amount of medicine.    Once this condition has been diagnosed, it is important to begin treatment as soon as possible. For some people, epilepsy eventually goes away.  Follow these instructions at home:  Medicines    · Take over-the-counter and prescription medicines only as told by your health care provider.  · Avoid any substances that may prevent your medicine from working properly, such as alcohol.  Activity  · Get enough rest. Lack of sleep can make seizures more likely to occur.  · Follow instructions from your health care provider about driving, swimming, and doing any other activities that would be dangerous if you had a seizure.  Educating others  Teach friends and family what to do if you have a seizure. They should:  · Lay you on the ground to prevent a fall.  · Cushion your head and body.  · Loosen any tight clothing around your neck.  · Turn you on your side. If vomiting occurs, this helps keep your airway clear.  · Stay with you until you recover.  · Not  hold you down. Holding you down will not stop the seizure.  · Not put anything in your mouth.  · Know whether or not you need emergency care.    General instructions  · Avoid anything that has ever triggered a seizure for you.  · Keep a seizure diary. Record what you remember about each seizure, especially anything that might have triggered the seizure.  · Keep all follow-up visits as told by your health care provider. This is important.  Contact a health care provider if:  · Your seizure pattern changes.  · You have symptoms of infection or another illness. This might increase your risk of having a seizure.  Get help right away if:  · You have a seizure that does not stop after 5 minutes.  · You have several seizures in a row without a complete recovery in between seizures.  · You have a seizure that makes it harder to breathe.  · You have a seizure that is different from previous seizures.  · You have a seizure that leaves you unable to speak or use a part of your body.  · You did not wake up immediately after a seizure.  This information is not intended to replace advice given to you by your health care provider. Make sure you discuss any questions you have with your health care provider.  Document Released: 12/18/2006 Document Revised: 07/15/2017 Document Reviewed: 06/27/2017  [employer] Interactive Patient Education © 2018 [employer]

## 2019-01-21 ENCOUNTER — TELEPHONE (OUTPATIENT)
Dept: NEUROLOGY | Facility: CLINIC | Age: 44
End: 2019-01-21

## 2019-01-25 ENCOUNTER — TELEPHONE (OUTPATIENT)
Dept: INTERNAL MEDICINE | Age: 44
End: 2019-01-25

## 2019-01-25 DIAGNOSIS — Z12.5 SCREENING FOR PROSTATE CANCER: Primary | ICD-10-CM

## 2019-01-25 DIAGNOSIS — E29.1 HYPOGONADISM IN MALE: ICD-10-CM

## 2019-02-22 DIAGNOSIS — Z12.5 SCREENING FOR PROSTATE CANCER: ICD-10-CM

## 2019-02-22 DIAGNOSIS — Z13.220 SCREENING FOR HYPERLIPIDEMIA: ICD-10-CM

## 2019-02-22 DIAGNOSIS — E29.1 HYPOGONADISM IN MALE: ICD-10-CM

## 2019-02-22 DIAGNOSIS — I10 ESSENTIAL HYPERTENSION: ICD-10-CM

## 2019-02-22 LAB
ALBUMIN SERPL-MCNC: 4.5 G/DL (ref 3.5–5.2)
ALP BLD-CCNC: 85 U/L (ref 40–130)
ALT SERPL-CCNC: 24 U/L (ref 5–41)
ANION GAP SERPL CALCULATED.3IONS-SCNC: 14 MMOL/L (ref 7–19)
AST SERPL-CCNC: 31 U/L (ref 5–40)
BASOPHILS ABSOLUTE: 0.1 K/UL (ref 0–0.2)
BASOPHILS RELATIVE PERCENT: 0.7 % (ref 0–1)
BILIRUB SERPL-MCNC: <0.2 MG/DL (ref 0.2–1.2)
BUN BLDV-MCNC: 13 MG/DL (ref 6–20)
CALCIUM SERPL-MCNC: 10 MG/DL (ref 8.6–10)
CHLORIDE BLD-SCNC: 94 MMOL/L (ref 98–111)
CHOLESTEROL, TOTAL: 140 MG/DL (ref 160–199)
CO2: 30 MMOL/L (ref 22–29)
CREAT SERPL-MCNC: 1.2 MG/DL (ref 0.5–1.2)
EOSINOPHILS ABSOLUTE: 0.2 K/UL (ref 0–0.6)
EOSINOPHILS RELATIVE PERCENT: 3 % (ref 0–5)
GFR NON-AFRICAN AMERICAN: >60
GLUCOSE BLD-MCNC: 88 MG/DL (ref 74–109)
HCT VFR BLD CALC: 42.7 % (ref 42–52)
HDLC SERPL-MCNC: 53 MG/DL (ref 55–121)
HEMOGLOBIN: 14.1 G/DL (ref 14–18)
LDL CHOLESTEROL CALCULATED: 55 MG/DL
LYMPHOCYTES ABSOLUTE: 2.1 K/UL (ref 1.1–4.5)
LYMPHOCYTES RELATIVE PERCENT: 28.8 % (ref 20–40)
MCH RBC QN AUTO: 31 PG (ref 27–31)
MCHC RBC AUTO-ENTMCNC: 33 G/DL (ref 33–37)
MCV RBC AUTO: 93.8 FL (ref 80–94)
MONOCYTES ABSOLUTE: 0.5 K/UL (ref 0–0.9)
MONOCYTES RELATIVE PERCENT: 6.9 % (ref 0–10)
NEUTROPHILS ABSOLUTE: 4.5 K/UL (ref 1.5–7.5)
NEUTROPHILS RELATIVE PERCENT: 60.3 % (ref 50–65)
PDW BLD-RTO: 13.2 % (ref 11.5–14.5)
PLATELET # BLD: 248 K/UL (ref 130–400)
PMV BLD AUTO: 8.9 FL (ref 9.4–12.4)
POTASSIUM SERPL-SCNC: 3.6 MMOL/L (ref 3.5–5)
PROSTATE SPECIFIC ANTIGEN: 0.68 NG/ML (ref 0–4)
RBC # BLD: 4.55 M/UL (ref 4.7–6.1)
SODIUM BLD-SCNC: 138 MMOL/L (ref 136–145)
TESTOSTERONE TOTAL: 192 NG/DL (ref 249–836)
TOTAL PROTEIN: 7.9 G/DL (ref 6.6–8.7)
TRIGL SERPL-MCNC: 161 MG/DL (ref 0–149)
WBC # BLD: 7.4 K/UL (ref 4.8–10.8)

## 2019-02-28 ENCOUNTER — OFFICE VISIT (OUTPATIENT)
Dept: INTERNAL MEDICINE | Age: 44
End: 2019-02-28
Payer: MEDICAID

## 2019-02-28 VITALS
WEIGHT: 250 LBS | BODY MASS INDEX: 32.08 KG/M2 | SYSTOLIC BLOOD PRESSURE: 140 MMHG | TEMPERATURE: 97.6 F | OXYGEN SATURATION: 98 % | HEIGHT: 74 IN | DIASTOLIC BLOOD PRESSURE: 80 MMHG | HEART RATE: 108 BPM

## 2019-02-28 DIAGNOSIS — Z00.00 ROUTINE GENERAL MEDICAL EXAMINATION AT A HEALTH CARE FACILITY: Primary | ICD-10-CM

## 2019-02-28 DIAGNOSIS — F41.9 ANXIETY: ICD-10-CM

## 2019-02-28 DIAGNOSIS — E29.1 HYPOGONADISM MALE: ICD-10-CM

## 2019-02-28 DIAGNOSIS — I10 ESSENTIAL HYPERTENSION: ICD-10-CM

## 2019-02-28 DIAGNOSIS — Z86.011 HISTORY OF MENINGIOMA OF THE BRAIN: ICD-10-CM

## 2019-02-28 DIAGNOSIS — R56.9 SEIZURES (HCC): ICD-10-CM

## 2019-02-28 DIAGNOSIS — K64.9 HEMORRHOIDS, UNSPECIFIED HEMORRHOID TYPE: ICD-10-CM

## 2019-02-28 PROCEDURE — 99214 OFFICE O/P EST MOD 30 MIN: CPT | Performed by: PHYSICIAN ASSISTANT

## 2019-02-28 RX ORDER — HYDRALAZINE HYDROCHLORIDE 10 MG/1
10 TABLET, FILM COATED ORAL DAILY
Qty: 90 TABLET | Refills: 3 | Status: SHIPPED | OUTPATIENT
Start: 2019-02-28 | End: 2020-02-28

## 2019-02-28 ASSESSMENT — ENCOUNTER SYMPTOMS
PHOTOPHOBIA: 0
CHEST TIGHTNESS: 0
SHORTNESS OF BREATH: 0
WHEEZING: 0
ABDOMINAL PAIN: 0
SINUS PRESSURE: 0
CONSTIPATION: 0
COUGH: 0
COLOR CHANGE: 0
NAUSEA: 0
BACK PAIN: 0
DIARRHEA: 0
SORE THROAT: 0
EYE PAIN: 0
RHINORRHEA: 0
VOMITING: 0
EYE REDNESS: 0

## 2019-02-28 ASSESSMENT — PATIENT HEALTH QUESTIONNAIRE - PHQ9
SUM OF ALL RESPONSES TO PHQ9 QUESTIONS 1 & 2: 0
SUM OF ALL RESPONSES TO PHQ QUESTIONS 1-9: 0
SUM OF ALL RESPONSES TO PHQ QUESTIONS 1-9: 0
2. FEELING DOWN, DEPRESSED OR HOPELESS: 0
1. LITTLE INTEREST OR PLEASURE IN DOING THINGS: 0

## 2019-03-04 ENCOUNTER — OFFICE VISIT (OUTPATIENT)
Dept: INTERNAL MEDICINE | Age: 44
End: 2019-03-04
Payer: MEDICAID

## 2019-03-04 VITALS
HEART RATE: 84 BPM | DIASTOLIC BLOOD PRESSURE: 90 MMHG | OXYGEN SATURATION: 98 % | TEMPERATURE: 98.2 F | SYSTOLIC BLOOD PRESSURE: 138 MMHG

## 2019-03-04 DIAGNOSIS — L73.9 PERINEAL FOLLICULITIS: ICD-10-CM

## 2019-03-04 PROCEDURE — 99213 OFFICE O/P EST LOW 20 MIN: CPT | Performed by: INTERNAL MEDICINE

## 2019-03-04 RX ORDER — DOXYCYCLINE 100 MG/1
100 TABLET ORAL 2 TIMES DAILY
Qty: 20 TABLET | Refills: 0 | Status: SHIPPED | OUTPATIENT
Start: 2019-03-04 | End: 2019-03-14

## 2019-03-12 DIAGNOSIS — E29.1 HYPOGONADISM MALE: ICD-10-CM

## 2019-03-12 LAB
FOLLICLE STIMULATING HORMONE: 4 MIU/ML
LUTEINIZING HORMONE: 5.9 MIU/ML

## 2019-03-15 LAB
SEX HORMONE BINDING GLOBULIN: 35 NMOL/L (ref 11–80)
TESTOSTERONE FREE-NONMALE: 100 PG/ML (ref 47–244)
TESTOSTERONE TOTAL: 494 NG/DL (ref 220–1000)

## 2019-03-19 ENCOUNTER — HOSPITAL ENCOUNTER (OUTPATIENT)
Dept: SLEEP MEDICINE | Facility: HOSPITAL | Age: 44
Discharge: HOME | End: 2019-03-19
Admitting: CLINICAL NURSE SPECIALIST

## 2019-03-19 VITALS
WEIGHT: 245 LBS | HEIGHT: 74 IN | DIASTOLIC BLOOD PRESSURE: 95 MMHG | BODY MASS INDEX: 31.44 KG/M2 | RESPIRATION RATE: 14 BRPM | SYSTOLIC BLOOD PRESSURE: 145 MMHG | HEART RATE: 94 BPM | OXYGEN SATURATION: 99 %

## 2019-03-19 DIAGNOSIS — G47.33 OSA (OBSTRUCTIVE SLEEP APNEA): ICD-10-CM

## 2019-03-19 PROCEDURE — 95811 POLYSOM 6/>YRS CPAP 4/> PARM: CPT | Performed by: PSYCHIATRY & NEUROLOGY

## 2019-03-19 PROCEDURE — 95811 POLYSOM 6/>YRS CPAP 4/> PARM: CPT

## 2019-03-20 ENCOUNTER — TELEPHONE (OUTPATIENT)
Dept: INTERNAL MEDICINE | Age: 44
End: 2019-03-20

## 2019-03-22 DIAGNOSIS — L73.9 PERINEAL FOLLICULITIS: Primary | ICD-10-CM

## 2019-03-22 RX ORDER — SULFAMETHOXAZOLE AND TRIMETHOPRIM 800; 160 MG/1; MG/1
1 TABLET ORAL 2 TIMES DAILY
Qty: 20 TABLET | Refills: 0 | Status: SHIPPED | OUTPATIENT
Start: 2019-03-22 | End: 2019-03-26

## 2019-03-26 ENCOUNTER — TELEPHONE (OUTPATIENT)
Dept: INTERNAL MEDICINE | Age: 44
End: 2019-03-26

## 2019-03-26 ENCOUNTER — LAB (OUTPATIENT)
Dept: LAB | Facility: HOSPITAL | Age: 44
End: 2019-03-26

## 2019-03-26 DIAGNOSIS — Z51.81 THERAPEUTIC DRUG MONITORING: Primary | ICD-10-CM

## 2019-03-26 DIAGNOSIS — E87.1 HYPONATREMIA: ICD-10-CM

## 2019-03-26 DIAGNOSIS — Z51.81 THERAPEUTIC DRUG MONITORING: ICD-10-CM

## 2019-03-26 LAB
ALBUMIN SERPL-MCNC: 5 G/DL (ref 3.5–5)
ALBUMIN/GLOB SERPL: 1.6 G/DL (ref 1.1–2.5)
ALP SERPL-CCNC: 89 U/L (ref 24–120)
ALT SERPL W P-5'-P-CCNC: 24 U/L (ref 0–54)
ANION GAP SERPL CALCULATED.3IONS-SCNC: 11 MMOL/L (ref 4–13)
AST SERPL-CCNC: 36 U/L (ref 7–45)
BILIRUB SERPL-MCNC: 0.5 MG/DL (ref 0.1–1)
BUN BLD-MCNC: 13 MG/DL (ref 5–21)
BUN/CREAT SERPL: 10.7 (ref 7–25)
CALCIUM SPEC-SCNC: 10 MG/DL (ref 8.4–10.4)
CARBAMAZEPINE SERPL-MCNC: 12.5 MCG/ML (ref 4–12)
CHLORIDE SERPL-SCNC: 87 MMOL/L (ref 98–110)
CO2 SERPL-SCNC: 29 MMOL/L (ref 24–31)
CREAT BLD-MCNC: 1.22 MG/DL (ref 0.5–1.4)
GFR SERPL CREATININE-BSD FRML MDRD: 79 ML/MIN/1.73
GLOBULIN UR ELPH-MCNC: 3.2 GM/DL
GLUCOSE BLD-MCNC: 69 MG/DL (ref 70–100)
POTASSIUM BLD-SCNC: 4.4 MMOL/L (ref 3.5–5.3)
PROT SERPL-MCNC: 8.2 G/DL (ref 6.3–8.7)
SODIUM BLD-SCNC: 127 MMOL/L (ref 135–145)

## 2019-03-26 PROCEDURE — 80053 COMPREHEN METABOLIC PANEL: CPT | Performed by: CLINICAL NURSE SPECIALIST

## 2019-03-26 PROCEDURE — 80156 ASSAY CARBAMAZEPINE TOTAL: CPT | Performed by: CLINICAL NURSE SPECIALIST

## 2019-03-26 PROCEDURE — 36415 COLL VENOUS BLD VENIPUNCTURE: CPT

## 2019-03-26 RX ORDER — CEPHALEXIN 500 MG/1
500 CAPSULE ORAL 4 TIMES DAILY
Qty: 28 CAPSULE | Refills: 0 | Status: SHIPPED | OUTPATIENT
Start: 2019-03-26 | End: 2019-04-02

## 2019-04-05 ENCOUNTER — OFFICE VISIT (OUTPATIENT)
Dept: NEUROLOGY | Facility: CLINIC | Age: 44
End: 2019-04-05

## 2019-04-05 VITALS
SYSTOLIC BLOOD PRESSURE: 120 MMHG | HEIGHT: 74 IN | RESPIRATION RATE: 12 BRPM | BODY MASS INDEX: 31.44 KG/M2 | DIASTOLIC BLOOD PRESSURE: 80 MMHG | HEART RATE: 88 BPM | WEIGHT: 245 LBS

## 2019-04-05 DIAGNOSIS — G47.33 OSA ON CPAP: ICD-10-CM

## 2019-04-05 DIAGNOSIS — Z99.89 OSA ON CPAP: ICD-10-CM

## 2019-04-05 DIAGNOSIS — G89.29 CHRONIC INTRACTABLE HEADACHE, UNSPECIFIED HEADACHE TYPE: ICD-10-CM

## 2019-04-05 DIAGNOSIS — R51.9 CHRONIC INTRACTABLE HEADACHE, UNSPECIFIED HEADACHE TYPE: ICD-10-CM

## 2019-04-05 DIAGNOSIS — R56.9 SEIZURE (HCC): Primary | ICD-10-CM

## 2019-04-05 DIAGNOSIS — Z86.03 HISTORY OF RESECTION OF MENINGIOMA: ICD-10-CM

## 2019-04-05 DIAGNOSIS — Z98.890 HISTORY OF RESECTION OF MENINGIOMA: ICD-10-CM

## 2019-04-05 PROCEDURE — 99406 BEHAV CHNG SMOKING 3-10 MIN: CPT | Performed by: CLINICAL NURSE SPECIALIST

## 2019-04-05 PROCEDURE — 99213 OFFICE O/P EST LOW 20 MIN: CPT | Performed by: CLINICAL NURSE SPECIALIST

## 2019-04-05 RX ORDER — HYDRALAZINE HYDROCHLORIDE 10 MG/1
10 TABLET, FILM COATED ORAL DAILY
COMMUNITY

## 2019-04-05 RX ORDER — AMITRIPTYLINE HYDROCHLORIDE 50 MG/1
TABLET, FILM COATED ORAL
Qty: 45 TABLET | Refills: 5 | Status: ON HOLD | OUTPATIENT
Start: 2019-04-05 | End: 2019-05-10

## 2019-04-05 NOTE — PROGRESS NOTES
Subjective     Chief Complaint   Patient presents with   • Migraine     No headaches recently   • Seizures     No seizures lately. He is not taking the extra Tegretol during the day.   • Sleep Apnea     Wearing CPAP and tolerating it well.         Chirag Mata is a 43 y.o. male right handed, currently not working.   He is here today for  follow up for seizure. He is accompanied by his girl friend, Carmen.  Patient is ambulating unassisted. He was last seen 1/18/2019. At last visit had described events consistent with breakthrough seizure. Tegretol 100 mg added to 200 mg BID. Patients states he did take but caused excessive drowsiness and he stopped taking 100 mg and has continued with tegretol 200mg BID.  He also was having increased HA. He was taking Elavil 50 mg but his SO takes 25 mg and has been taking some of hers for total of 75 mg. With this he reports improvement of HA. He did have labs about 1 week ago. Tegretol level 12.7 and sodium level was 127. He was to have repeat labs prior to today but did not have done yet.   He did have home sleep study followed by in lab PSG and is now using CPAP for about 1 week. Reports since using does have more restored sleep. He is scheduled for sleep follow up with LETICIA JONES 4/23/19.      He has history of  bilateral frontal meningioma resections in 2015.  he has hx of HTN,    EPWORTH= 3, STOP-BANG= HIGH  He has hx of tobacco abuse 1 PPD FOR at least 20 years and HTN.          Patient has history of bilateral frontal meningioma resections in 2015. He reports in 2012 began to have dizziness and would fall down.  He reportedly took Keppra just after surgery and it made him sick.  He stopped it shortly afterwards.  He has had no issues until the several days prior to admission October 15, 2018 .  His girlfriend  assisted in the history.  She tells me that for the  several days prior to admission he was having  episodes of staring off in space and not being  responsive.  He went to bed 10/14/18 at 11 PM.  At 3 AM this morning she woke up and found him to have increased tone and rhythmic jerking of the left face, arm, and leg.  This lasted less than 15 seconds but occur approximately 6-10 times.  He had additional seizures in the ambulance on the way to the hospital and several in the ED as well.  These were eventually stopped after multiple rounds of Versed and a load of fosphenytoin.  He did have tongue biting with this but no urinary incontinence.  He has no previous history of seizures.      Seizures    This is a chronic problem. Episode onset: last reported 10/21/18. There were more than 10 seizures. The most recent episode lasted 30 to 120 seconds. Associated symptoms include confusion, headaches and chest pain. Pertinent negatives include no speech difficulty, no cough, no nausea, no vomiting and no diarrhea. Characteristics include rhythmic jerking (shaking of legs), loss of consciousness, bit tongue and apnea. Characteristics do not include bowel incontinence or bladder incontinence. stare off, not able to talk. confusion The episode was witnessed (girlfriend). The seizures continued in the ED. hx of bilateral frontal meningioma resection 2015        Current Outpatient Medications   Medication Sig Dispense Refill   • amitriptyline (ELAVIL) 50 MG tablet Take 1 1/2 tablets at HS 45 tablet 5   • carBAMazepine (TEGRETOL) 200 MG tablet Take 2 tablets by mouth 2 (Two) Times a Day. 120 tablet 5   • hydrALAZINE (APRESOLINE) 10 MG tablet Take 10 mg by mouth Daily.     • hydrochlorothiazide (HYDRODIURIL) 25 MG tablet Take 25 mg by mouth Daily.       No current facility-administered medications for this visit.        Past Medical History:   Diagnosis Date   • Drug abuse (CMS/HCC)    • Hypertension        Past Surgical History:   Procedure Laterality Date   • TESTICLE TORSION REPAIR         family history is not on file.    Social History     Tobacco Use   • Smoking status:  "Current Every Day Smoker     Packs/day: 1.00   Substance Use Topics   • Alcohol use: Yes     Comment: ocassionally   • Drug use: No       Review of Systems   Constitutional: Negative.  Negative for fatigue.   HENT: Negative for trouble swallowing and voice change.         Hiccups   Eyes: Negative.    Respiratory: Positive for apnea and shortness of breath. Negative for cough.    Cardiovascular: Positive for chest pain. Negative for leg swelling.   Gastrointestinal: Negative.  Negative for bowel incontinence, constipation, diarrhea, nausea and vomiting.   Endocrine: Negative.  Negative for cold intolerance and heat intolerance.   Genitourinary: Negative.  Negative for bladder incontinence, dysuria and frequency.   Musculoskeletal: Negative for arthralgias and gait problem.   Skin: Negative.    Allergic/Immunologic: Negative.    Neurological: Positive for seizures, loss of consciousness and headaches. Negative for dizziness, speech difficulty and weakness.   Hematological: Negative.    Psychiatric/Behavioral: Positive for confusion. Negative for agitation and hallucinations.   All other systems reviewed and are negative.      Objective     /80 (BP Location: Left arm, Patient Position: Sitting, Cuff Size: Adult)   Pulse 88   Resp 12   Ht 188 cm (74\")   Wt 111 kg (245 lb)   BMI 31.46 kg/m² , Body mass index is 31.46 kg/m².    Physical Exam   Constitutional: He is oriented to person, place, and time. Vital signs are normal. He appears well-developed and well-nourished. He is cooperative.   HENT:   Head: Normocephalic and atraumatic.   Right Ear: Hearing and external ear normal.   Left Ear: Hearing and external ear normal.   Nose: Nose normal.   Mouth/Throat: Oropharynx is clear and moist.   Eyes: Conjunctivae, EOM and lids are normal. Pupils are equal, round, and reactive to light. Right eye exhibits normal extraocular motion and no nystagmus. Left eye exhibits normal extraocular motion and no nystagmus. Right " pupil is round and reactive. Left pupil is round and reactive. Pupils are equal.   Neck: Normal range of motion. Neck supple. Carotid bruit is not present.   Cardiovascular: Normal rate, regular rhythm and normal heart sounds.   No murmur heard.  Pulses:       Dorsalis pedis pulses are 1+ on the right side, and 1+ on the left side.        Posterior tibial pulses are 1+ on the right side, and 1+ on the left side.   Pulmonary/Chest: Effort normal and breath sounds normal. He has no decreased breath sounds. He has no rhonchi.   Abdominal: Soft. Bowel sounds are normal.   Musculoskeletal: Normal range of motion.     Vascular Status -  His right foot exhibits no edema. His left foot exhibits no edema.  Neurological: He is alert and oriented to person, place, and time. He has normal strength and normal reflexes. He displays no tremor. No cranial nerve deficit or sensory deficit. He exhibits normal muscle tone. He displays a negative Romberg sign. Coordination and gait normal.   Reflex Scores:       Tricep reflexes are 2+ on the right side and 2+ on the left side.       Bicep reflexes are 2+ on the right side and 2+ on the left side.       Brachioradialis reflexes are 2+ on the right side and 2+ on the left side.       Patellar reflexes are 2+ on the right side and 2+ on the left side.       Achilles reflexes are 2+ on the right side and 2+ on the left side.  Awake, alert. No aphasia, no dysarthria  Completes simple and complex commands    CN II:  Visual fields full.  Pupils equally reactive to light  CN III, IV, VI:  Extraocular Muscles full with no signs of nystagmus  CN V:  Facial sensory is symmetric with no asymetries.  CN VII:  Facial motor symmetric  CN VIII:  Gross hearing intact bilaterally  CN IX:  Palate elevates symmetrically  CN X:  Palate elevates symmetrically  CN XI:  Shoulder shrug symmetric  CN XII:  Tongue is midline on protrusion    Full and symmetric strength bilateral upper and lower extremities.    Skin: Skin is warm and dry.   Psychiatric: He has a normal mood and affect. His speech is normal and behavior is normal. Cognition and memory are normal.   Nursing note and vitals reviewed.      Results for orders placed or performed in visit on 03/26/19   Comprehensive Metabolic Panel   Result Value Ref Range    Glucose 69 (L) 70 - 100 mg/dL    BUN 13 5 - 21 mg/dL    Creatinine 1.22 0.50 - 1.40 mg/dL    Sodium 127 (L) 135 - 145 mmol/L    Potassium 4.4 3.5 - 5.3 mmol/L    Chloride 87 (L) 98 - 110 mmol/L    CO2 29.0 24.0 - 31.0 mmol/L    Calcium 10.0 8.4 - 10.4 mg/dL    Total Protein 8.2 6.3 - 8.7 g/dL    Albumin 5.00 3.50 - 5.00 g/dL    ALT (SGPT) 24 0 - 54 U/L    AST (SGOT) 36 7 - 45 U/L    Alkaline Phosphatase 89 24 - 120 U/L    Total Bilirubin 0.5 0.1 - 1.0 mg/dL    eGFR  African Amer 79 >60 mL/min/1.73    Globulin 3.2 gm/dL    A/G Ratio 1.6 1.1 - 2.5 g/dL    BUN/Creatinine Ratio 10.7 7.0 - 25.0    Anion Gap 11.0 4.0 - 13.0 mmol/L   Carbamazepine Level, Total   Result Value Ref Range    Carbamazepine Level 12.5 (H) 4.0 - 12.0 mcg/mL      Polysomnogram: SLEEP STUDY REPORT     REFERRING PHYSICIAN:  ROBERT Powell     HISTORY OF PRESENT ILLNESS: Patient with history of MARIA LUISA for titration.     FINDINGS ON STUDY: Patient titrated to CPAP of 11 cm water pressure with heated humidification.  Time in bed for 33 minutes with total sleep time 393 minutes and sleep efficiency 91%.  Latency to sleep was 27.5 minutes.  Latency to  minutes.  REM is 10.4%.  Stage I-II 0.9%.  Stage II 86%.  Stage III 0.6%.  AHI 4.3.  Low SpO2 is 86%.  PLM index not significant.  Patient was supine 47% of the night.  Average pulse rate 78.3 bpm with his pulse rate 98 bpm and lowest pulse rate 65 bpm.  Patient spent 0.6 minutes in the 80-89% oxygen saturation range with 0.3 minutes less than 89% oxygen saturation.  Patient exhibited some sharp activity noted during the record and has had previous history of seizures from bifrontal  meningioma resection 2015.  Patient was noted to have extrasystole noted /questionable PVC.     IMPRESSION:    Axis A 1: Obstructive sleep apnea G 47.33  Axis B 1: CPAP at 11 cm of water pressure with heated humidification.  Axis C: Underlying medical problems and medication effects may be contributory as above.  Question of increased cortical irritability which may be seizure genic seen during the recording.  Further evaluation by EEG/prolonged EEG may be helpful if clinically indicated.  Close follow-up with patient's family physician emphasis on safety to be accomplished.  Note that patient had previous EEG in 2018 abnormal with right hemispheric slowing      ASSESSMENT/PLAN    Diagnoses and all orders for this visit:    Seizure (CMS/HCC)    Chronic intractable headache, unspecified headache type    History of resection of meningioma    Other orders  -     hydrALAZINE (APRESOLINE) 10 MG tablet; Take 10 mg by mouth Daily.  -     amitriptyline (ELAVIL) 50 MG tablet; Take 1 1/2 tablets at HS    1. Continue  Tegretol  200 mg 2 tablet BID . Patient did not tolerate the additon of 100 mg and is no longer taking  2. Patient to repeat labs previously ordered.  3. No driving for 90 days from last seizure 1/2019.   4. Counseled on safety measures for seizure related to occupation. No climbing ladders, using sharp tools, shower only.  5. increase Elavil 75 mg nighthly  6. Counseled on risk factors that lower seizure threshold  7. Seizure precautions were discussed to include no tub baths, no swimming, avoiding lack of sleep, and avoiding known triggers. Education given of things that may contribute to a seizure to include, but not limited to: stressful situations, fever, fatigue, lack of sleep, low blood sugar, hyperventilation, flashing lights, and caffeine. Instructions given to take seizure medications as prescribed. Education given to family member on what to do during a seizure and care following the seizure. Education  given to contact this office prior to stopping or changing any medications.  8. Patient requesting referral and has upcoming appointment with Dr. Snell.  9.  I advised Chirag of the risks of continuing to use tobacco, and I provided him with tobacco cessation educational materials in the After Visit Summary.     During this visit, I spent 3-10  minutes counseling the patient regarding tobacco cessation.  10.  Patient's Body mass index is 31.46 kg/m². BMI is above normal parameters. Recommendations include: educational material.  11. Plan to repeat MRI brain 10/2019 for surveillance    HPI and ROS reviewed and updated.      allergies and all known medications/prescriptions have been reviewed using resources available on this encounter.    Return in about 4 months (around 8/5/2019).        Viviane Glasgow, APRN

## 2019-04-05 NOTE — PATIENT INSTRUCTIONS
Epilepsy  Epilepsy is a condition in which a person has repeated seizures over time. A seizure is a sudden burst of abnormal electrical and chemical activity in the brain. Seizures can cause a change in attention, behavior, or the ability to remain awake and alert (altered mental status).  Epilepsy increases a person's risk of falls, accidents, and injury. It can also lead to complications, including:  · Depression.  · Poor memory.  · Sudden unexplained death in epilepsy (SUDEP). This complication is rare, and its cause is not known.    Most people with epilepsy lead normal lives.  What are the causes?  This condition may be caused by:  · A head injury.  · An injury that happens at birth.  · A high fever during childhood.  · A stroke.  · Bleeding that goes into or around the brain.  · Certain medicines and drugs.  · Having too little oxygen for a long period of time.  · Abnormal brain development.  · Certain infections, such as meningitis and encephalitis.  · Brain tumors.  · Conditions that are passed along from parent to child (are hereditary).    What are the signs or symptoms?  Symptoms of a seizure vary greatly from person to person. They include:  · Convulsions.  · Stiffening of the body.  · Involuntary movements of the arms or legs.  · Loss of consciousness.  · Breathing problems.  · Falling suddenly.  · Confusion.  · Head nodding.  · Eye blinking or fluttering.  · Lip smacking.  · Drooling.  · Rapid eye movements.  · Grunting.  · Loss of bladder control and bowel control.  · Staring.  · Unresponsiveness.    Some people have symptoms right before a seizure happens (aura) and right after a seizure happens. Symptoms of an aura include:  · Fear or anxiety.  · Nausea.  · Feeling like the room is spinning (vertigo).  · A feeling of having seen or heard something before (celia vu).  · Odd tastes or smells.  · Changes in vision, such as seeing flashing lights or spots.    Symptoms that follow a seizure  include:  · Confusion.  · Sleepiness.  · Headache.    How is this diagnosed?  This condition is diagnosed based on:  · Your symptoms.  · Your medical history.  · A physical exam.  · A neurological exam. A neurological exam is similar to a physical exam. It involves checking your strength, reflexes, coordination, and sensations.  · Tests, such as:  ? An electroencephalogram (EEG). This is a painless test that creates a diagram of your brain waves.  ? An MRI of the brain.  ? A CT scan of the brain.  ? A lumbar puncture, also called a spinal tap.  ? Blood tests to check for signs of infection or abnormal blood chemistry.    How is this treated?  There is no cure for this condition, but treatment can help control seizures. Treatment may involve:  · Taking medicines to control seizures. These include medicines to prevent seizures and medicines to stop seizures as they occur.  · Having a device called a vagus nerve stimulator implanted in the chest. The device sends electrical impulses to the vagus nerve and to the brain to prevent seizures. This treatment may be recommended if medicines do not help.  · Brain surgery. There are several kinds of surgeries that may be done to stop seizures from happening or to reduce how often seizures happen.  · Having regular blood tests. You may need to have blood tests regularly to check that you are getting the right amount of medicine.    Once this condition has been diagnosed, it is important to begin treatment as soon as possible. For some people, epilepsy eventually goes away.  Follow these instructions at home:  Medicines    · Take over-the-counter and prescription medicines only as told by your health care provider.  · Avoid any substances that may prevent your medicine from working properly, such as alcohol.  Activity  · Get enough rest. Lack of sleep can make seizures more likely to occur.  · Follow instructions from your health care provider about driving, swimming, and doing  any other activities that would be dangerous if you had a seizure.  Educating others  Teach friends and family what to do if you have a seizure. They should:  · Lay you on the ground to prevent a fall.  · Cushion your head and body.  · Loosen any tight clothing around your neck.  · Turn you on your side. If vomiting occurs, this helps keep your airway clear.  · Stay with you until you recover.  · Not hold you down. Holding you down will not stop the seizure.  · Not put anything in your mouth.  · Know whether or not you need emergency care.    General instructions  · Avoid anything that has ever triggered a seizure for you.  · Keep a seizure diary. Record what you remember about each seizure, especially anything that might have triggered the seizure.  · Keep all follow-up visits as told by your health care provider. This is important.  Contact a health care provider if:  · Your seizure pattern changes.  · You have symptoms of infection or another illness. This might increase your risk of having a seizure.  Get help right away if:  · You have a seizure that does not stop after 5 minutes.  · You have several seizures in a row without a complete recovery in between seizures.  · You have a seizure that makes it harder to breathe.  · You have a seizure that is different from previous seizures.  · You have a seizure that leaves you unable to speak or use a part of your body.  · You did not wake up immediately after a seizure.  This information is not intended to replace advice given to you by your health care provider. Make sure you discuss any questions you have with your health care provider.  Document Released: 12/18/2006 Document Revised: 07/15/2017 Document Reviewed: 06/27/2017  [employer] Interactive Patient Education © 2019 [employer]  BMI for Adults  Body mass index (BMI) is a number that is calculated from a person's weight and height. In most adults, the number is used to find how much of an adult's weight is  made up of fat. BMI is not as accurate as a direct measure of body fat.  How is BMI calculated?  BMI is calculated by dividing weight in kilograms by height in meters squared. It can also be calculated by dividing weight in pounds by height in inches squared, then multiplying the resulting number by 703. Charts are available to help you find your BMI quickly and easily without doing this calculation.  How is BMI interpreted?  Health care professionals use BMI charts to identify whether an adult is underweight, at a normal weight, or overweight based on the following guidelines:  · Underweight: BMI less than 18.5.  · Normal weight: BMI between 18.5 and 24.9.  · Overweight: BMI between 25 and 29.9.  · Obese: BMI of 30 and above.    BMI is usually interpreted the same for males and females.  Weight includes both fat and muscle, so someone with a muscular build, such as an athlete, may have a BMI that is higher than 24.9. In cases like these, BMI may not accurately depict body fat. To determine if excess body fat is the cause of a BMI of 25 or higher, further assessments may need to be done by a health care provider.  Why is BMI a useful tool?  BMI is used to identify a possible weight problem that may be related to a medical problem or may increase the risk for medical problems. BMI can also be used to promote changes to reach a healthy weight.  This information is not intended to replace advice given to you by your health care provider. Make sure you discuss any questions you have with your health care provider.  Document Released: 08/29/2005 Document Revised: 04/27/2017 Document Reviewed: 05/15/2015  [employer] Interactive Patient Education © 2018 [employer]  Steps to Quit Smoking  Smoking tobacco can be bad for your health. It can also affect almost every organ in your body. Smoking puts you and people around you at risk for many serious long-lasting (chronic) diseases. Quitting smoking is hard, but it is one of  the best things that you can do for your health. It is never too late to quit.  What are the benefits of quitting smoking?  When you quit smoking, you lower your risk for getting serious diseases and conditions. They can include:  · Lung cancer or lung disease.  · Heart disease.  · Stroke.  · Heart attack.  · Not being able to have children (infertility).  · Weak bones (osteoporosis) and broken bones (fractures).    If you have coughing, wheezing, and shortness of breath, those symptoms may get better when you quit. You may also get sick less often. If you are pregnant, quitting smoking can help to lower your chances of having a baby of low birth weight.  What can I do to help me quit smoking?  Talk with your doctor about what can help you quit smoking. Some things you can do (strategies) include:  · Quitting smoking totally, instead of slowly cutting back how much you smoke over a period of time.  · Going to in-person counseling. You are more likely to quit if you go to many counseling sessions.  · Using resources and support systems, such as:  ? Online chats with a counselor.  ? Phone quitlines.  ? Printed self-help materials.  ? Support groups or group counseling.  ? Text messaging programs.  ? Mobile phone apps or applications.  · Taking medicines. Some of these medicines may have nicotine in them. If you are pregnant or breastfeeding, do not take any medicines to quit smoking unless your doctor says it is okay. Talk with your doctor about counseling or other things that can help you.    Talk with your doctor about using more than one strategy at the same time, such as taking medicines while you are also going to in-person counseling. This can help make quitting easier.  What things can I do to make it easier to quit?  Quitting smoking might feel very hard at first, but there is a lot that you can do to make it easier. Take these steps:  · Talk to your family and friends. Ask them to support and encourage  you.  · Call phone quitlines, reach out to support groups, or work with a counselor.  · Ask people who smoke to not smoke around you.  · Avoid places that make you want (trigger) to smoke, such as:  ? Bars.  ? Parties.  ? Smoke-break areas at work.  · Spend time with people who do not smoke.  · Lower the stress in your life. Stress can make you want to smoke. Try these things to help your stress:  ? Getting regular exercise.  ? Deep-breathing exercises.  ? Yoga.  ? Meditating.  ? Doing a body scan. To do this, close your eyes, focus on one area of your body at a time from head to toe, and notice which parts of your body are tense. Try to relax the muscles in those areas.  · Download or buy apps on your mobile phone or tablet that can help you stick to your quit plan. There are many free apps, such as QuitGuide from the CDC (Centers for Disease Control and Prevention). You can find more support from smokefree.gov and other websites.    This information is not intended to replace advice given to you by your health care provider. Make sure you discuss any questions you have with your health care provider.  Document Released: 10/14/2010 Document Revised: 08/15/2017 Document Reviewed: 05/03/2016  [employer] Interactive Patient Education © 2019 [employer]

## 2019-04-11 ENCOUNTER — OFFICE VISIT (OUTPATIENT)
Dept: OTOLARYNGOLOGY | Age: 44
End: 2019-04-11
Payer: MEDICAID

## 2019-04-11 ENCOUNTER — OFFICE VISIT (OUTPATIENT)
Dept: INTERNAL MEDICINE | Age: 44
End: 2019-04-11
Payer: MEDICAID

## 2019-04-11 VITALS
TEMPERATURE: 96.4 F | HEART RATE: 98 BPM | BODY MASS INDEX: 31.52 KG/M2 | OXYGEN SATURATION: 95 % | WEIGHT: 245.6 LBS | SYSTOLIC BLOOD PRESSURE: 120 MMHG | HEIGHT: 74 IN | DIASTOLIC BLOOD PRESSURE: 100 MMHG

## 2019-04-11 VITALS
DIASTOLIC BLOOD PRESSURE: 100 MMHG | RESPIRATION RATE: 18 BRPM | HEIGHT: 74 IN | BODY MASS INDEX: 31.44 KG/M2 | WEIGHT: 245 LBS | SYSTOLIC BLOOD PRESSURE: 120 MMHG

## 2019-04-11 DIAGNOSIS — I10 ESSENTIAL HYPERTENSION: ICD-10-CM

## 2019-04-11 DIAGNOSIS — L02.811 ABSCESS OF HEAD: Primary | ICD-10-CM

## 2019-04-11 DIAGNOSIS — L02.01 CUTANEOUS ABSCESS OF FACE: Primary | ICD-10-CM

## 2019-04-11 PROCEDURE — 99202 OFFICE O/P NEW SF 15 MIN: CPT | Performed by: OTOLARYNGOLOGY

## 2019-04-11 PROCEDURE — 99213 OFFICE O/P EST LOW 20 MIN: CPT | Performed by: PHYSICIAN ASSISTANT

## 2019-04-11 PROCEDURE — 10060 I&D ABSCESS SIMPLE/SINGLE: CPT | Performed by: OTOLARYNGOLOGY

## 2019-04-11 ASSESSMENT — ENCOUNTER SYMPTOMS
BACK PAIN: 0
COLOR CHANGE: 0
PHOTOPHOBIA: 0
EYE REDNESS: 0
SHORTNESS OF BREATH: 0
VOMITING: 0
DIARRHEA: 0
ABDOMINAL PAIN: 0
SORE THROAT: 0
NAUSEA: 0
EYE PAIN: 0
SINUS PRESSURE: 0
CHEST TIGHTNESS: 0
WHEEZING: 0
COUGH: 0
CONSTIPATION: 0
RHINORRHEA: 0

## 2019-04-11 NOTE — PROGRESS NOTES
[facility]  [address] [facility]  [address] [address]  Dept: [phone]  Dept Fax: [phone]  Loc: [phone]     Tere Mast is a 37 y.o. male who presents today for his medical conditions/complaintsas noted below. Tere Mast is c/o of New Patient (Referred by ERICK Saleh ) and Cyst (Forehead)      Past Medical History:   Diagnosis Date    Headache     Hypertension       Past Surgical History:   Procedure Laterality Date    BRAIN SURGERY  2015    tumor removal       No family history on file. Social History     Tobacco Use    Smoking status: Current Every Day Smoker     Packs/day: 0.50     Types: Cigarettes    Smokeless tobacco: Never Used   Substance Use Topics    Alcohol use: Yes         Current Outpatient Medications   Medication Sig Dispense Refill    Multiple Vitamins-Minerals (MENS MULTIVITAMIN PLUS) TABS Take by mouth daily      hydrALAZINE (APRESOLINE) 10 MG tablet Take 1 tablet by mouth daily 90 tablet 3    ondansetron (ZOFRAN) 4 MG tablet Take 1 tablet by mouth every 12 hours as needed for Nausea or Vomiting 60 tablet 1    hydrochlorothiazide (HYDRODIURIL) 25 MG tablet Take 1 tablet by mouth daily 90 tablet 3    amitriptyline (ELAVIL) 50 MG tablet Take 1 tablet by mouth nightly (Patient taking differently: Take 75 mg by mouth nightly ) 90 tablet 3     No current facility-administered medications for this visit. No Known Allergies    Subjective:   Facial abscess focal right forehead with 1 cm abscess and surrounding induration. Review of Systems  Not on any anticoagulants    Surgeon:  Jaspreet Bianchi MD    Anesthesia:  Local     Preoperative Diagnosis:  Facial abscess    Postoperative Diagnosis:  Same    Operative Procedure: Incision and drainage forehead abscess    Discription of technique:  Skin prepped povoiodine and injected with 1% xylocaine.     Findings: 1 cm purulence expressed coated with mupirocin and

## 2019-04-11 NOTE — PROGRESS NOTES
[facility]  [address] [facility]  [address] [place] [address]  Dept: [phone]  Dept Fax: [phone]: [phone]      [facility]  OFFICE NOTE      Chief Complaint   Patient presents with    Other     knot on right side of head, now right eye is swollen for 4 days now        Kai Yates is a 37y.o. year old male who is seen for evaluation of abscess/infected cyst on the right side of the 4 head over the eye causing pain and swelling around the eye. Patient states was a small ball and it is gotten bigger and become more painful over the last 4 days. Patient's significant other stated that they have been putting warm wet compresses and try and do pop it but it only got a little bit of pus out of it. Patient states is causing him quite a bit of discomfort. Patient's significant other wanted to be cut out. Patient states has some Keflex at home that he has not taken yet but not sure whether he should take it or not. Patient's blood pressure was mildly elevated today. Patient states that they check it every day and ever since we added hydralazine it is been doing very well. Patient states doesn't know his cause muscle pain or as being in the doctor's office. Patient denies any chest pain or shortness of breath. Active Ambulatory Problems     Diagnosis Date Noted    Anxiety 07/20/2018    Primary insomnia 07/20/2018    Essential hypertension 07/20/2018    History of meningioma of the brain 07/20/2018    Hemorrhoids 07/20/2018    Perineal folliculitis 94/53/1184     Resolved Ambulatory Problems     Diagnosis Date Noted    No Resolved Ambulatory Problems     Past Medical History:   Diagnosis Date    Headache     Hypertension        Past Medical History:   Diagnosis Date    Headache     Hypertension        Prior to Visit Medications    Medication Sig Taking?  Authorizing Provider   Multiple Vitamins-Minerals (MENS MULTIVITAMIN PLUS) TABS Take by mouth daily Yes Historical Provider, MD   hydrALAZINE (APRESOLINE) 10 MG tablet Take 1 tablet by mouth daily Yes MOLLY Payne   ondansetron (ZOFRAN) 4 MG tablet Take 1 tablet by mouth every 12 hours as needed for Nausea or Vomiting Yes MOLLY Payne   hydrochlorothiazide (HYDRODIURIL) 25 MG tablet Take 1 tablet by mouth daily Yes MOLLY Payne   amitriptyline (ELAVIL) 50 MG tablet Take 1 tablet by mouth nightly  Patient taking differently: Take 75 mg by mouth nightly  Yes MOLLY Payne       Past Surgical History:   Procedure Laterality Date    BRAIN SURGERY  2015    tumor removal       History reviewed. No pertinent family history. No Known Allergies    Social History     Socioeconomic History    Marital status: Single     Spouse name: Not on file    Number of children: Not on file    Years of education: Not on file    Highest education level: Not on file   Occupational History    Not on file   Social Needs    Financial resource strain: Not on file    Food insecurity:     Worry: Not on file     Inability: Not on file    Transportation needs:     Medical: Not on file     Non-medical: Not on file   Tobacco Use    Smoking status: Current Every Day Smoker     Packs/day: 0.50     Types: Cigarettes    Smokeless tobacco: Never Used   Substance and Sexual Activity    Alcohol use:  Yes    Drug use: No    Sexual activity: Not on file   Lifestyle    Physical activity:     Days per week: Not on file     Minutes per session: Not on file    Stress: Not on file   Relationships    Social connections:     Talks on phone: Not on file     Gets together: Not on file     Attends [employer] service: Not on file     Active member of club or organization: Not on file     Attends meetings of clubs or organizations: Not on file     Relationship status: Not on file    Intimate partner violence:     Fear of current or ex partner: Not on file     Emotionally abused: 120/100   Pulse 98   Temp 96.4 °F (35.8 °C)   Ht 6' 2\" (1.88 m)   Wt 245 lb 9.6 oz (111.4 kg)   SpO2 95%   BMI 31.53 kg/m²     PHYSICAL EXAM  Physical Exam   Constitutional: Vital signs are normal. He appears well-developed and well-nourished. HENT:   Head: Normocephalic and atraumatic. Right Ear: External ear normal.   Left Ear: External ear normal.   Nose: Nose normal.   Eyes: Pupils are equal, round, and reactive to light. Right eye exhibits no discharge. Left eye exhibits no discharge. Neck: Normal range of motion. No tracheal deviation present. Cardiovascular: Normal rate, regular rhythm and normal heart sounds. Exam reveals no gallop and no friction rub. No murmur heard. Pulmonary/Chest: Effort normal and breath sounds normal. No respiratory distress. He has no wheezes. He has no rales. He exhibits no tenderness. Abdominal: Soft. Bowel sounds are normal. There is no tenderness. There is no rebound and no guarding. Musculoskeletal: Normal range of motion. He exhibits no tenderness or deformity. Lymphadenopathy:     He has no cervical adenopathy. Neurological: He is alert. He has normal reflexes. Skin: Skin is warm, dry and intact. No lesion and no rash noted. No erythema. Psychiatric: He has a normal mood and affect. His mood appears not anxious. He does not exhibit a depressed mood. Nursing note and vitals reviewed. ASSESSMENT      ICD-10-CM    1. Abscess of head L02.811 Tamara Lopes MD, Otolaryngology, [place]   2. Essential hypertension I10        PLAN  I discussed with patient that I do not feel comfortable opening abscess right above the eye and on someone's face. Patient is worried about having any kind of scar or any blemish on his  Face I called over to Gen. Surgery and Dr. Brnadan Mejia recommended patient be seen by ear nose and throat. Call Dr. Alexys Valencia and explained the lesion and he kindly agreed to see the patient.   Patient will continue to monitor blood pressure. Patient states is been doing really good on the HCTZ and the hydralazine. Return if symptoms worsen or fail to improve, for Follow up with labs. All questions answered. Patient voices understanding and agrees to plans along with risks and benefits of plan. Patient is instructed to continue prior medications, diet, and exercise plans as instructed. Patient agrees to follow up as instructions, sooner if needed, or to go to ER if condition becomes emergent. Additional Instructions: As always, patient is advised to bring in medication bottles in order to correctly reconcile with our current list.      Bernarda Mendez PA-C    EMR Dragon/transcription disclaimer: Much of this encounter note is electronic transcription/translation of spoken language to printed text. The electronictranslation of spoken language may be erroneous, or at times, nonsensical words or phrases may be inadvertently transcribed.  Although I have reviewed the note for such errors, some may still exist.

## 2019-04-17 ENCOUNTER — TELEPHONE (OUTPATIENT)
Dept: NEUROSURGERY | Age: 44
End: 2019-04-17

## 2019-04-18 ENCOUNTER — TELEPHONE (OUTPATIENT)
Dept: NEUROLOGY | Facility: CLINIC | Age: 44
End: 2019-04-18

## 2019-04-18 NOTE — TELEPHONE ENCOUNTER
Carmen left a message and asked if you would call her.  She said she wanted to talk to you about something that was discussed at his last appointment.

## 2019-04-18 NOTE — TELEPHONE ENCOUNTER
I did talk with Carmen, patient SO, he is in a residential facility and is using CPAP but will be there for about 30 days and will not be able to keep 4/23/19 appointment. Please move appointment to end of May with blaire. thanks

## 2019-05-07 RX ORDER — CARBAMAZEPINE 200 MG/1
400 TABLET ORAL 2 TIMES DAILY
Qty: 120 TABLET | Refills: 2 | Status: SHIPPED | OUTPATIENT
Start: 2019-05-07 | End: 2019-07-31 | Stop reason: SDUPTHER

## 2019-05-09 ENCOUNTER — HOSPITAL ENCOUNTER (INPATIENT)
Facility: HOSPITAL | Age: 44
LOS: 4 days | Discharge: HOME | End: 2019-05-14
Attending: EMERGENCY MEDICINE | Admitting: PSYCHIATRY & NEUROLOGY

## 2019-05-09 ENCOUNTER — APPOINTMENT (OUTPATIENT)
Dept: CT IMAGING | Facility: HOSPITAL | Age: 44
End: 2019-05-09

## 2019-05-09 DIAGNOSIS — R51.9 NONINTRACTABLE HEADACHE, UNSPECIFIED CHRONICITY PATTERN, UNSPECIFIED HEADACHE TYPE: Primary | ICD-10-CM

## 2019-05-09 DIAGNOSIS — R90.0 INTRACRANIAL MASS: ICD-10-CM

## 2019-05-09 LAB
ALBUMIN SERPL-MCNC: 4.1 G/DL (ref 3.5–5)
ALBUMIN/GLOB SERPL: 1.2 G/DL (ref 1.1–2.5)
ALP SERPL-CCNC: 64 U/L (ref 24–120)
ALT SERPL W P-5'-P-CCNC: 22 U/L (ref 0–54)
ANION GAP SERPL CALCULATED.3IONS-SCNC: 8 MMOL/L (ref 4–13)
AST SERPL-CCNC: 27 U/L (ref 7–45)
BILIRUB SERPL-MCNC: 0.3 MG/DL (ref 0.1–1)
BUN BLD-MCNC: 11 MG/DL (ref 5–21)
BUN/CREAT SERPL: 10.9 (ref 7–25)
CALCIUM SPEC-SCNC: 9.3 MG/DL (ref 8.4–10.4)
CHLORIDE SERPL-SCNC: 91 MMOL/L (ref 98–110)
CO2 SERPL-SCNC: 33 MMOL/L (ref 24–31)
CREAT BLD-MCNC: 1.01 MG/DL (ref 0.5–1.4)
DEPRECATED RDW RBC AUTO: 43.4 FL (ref 40–54)
ERYTHROCYTE [DISTWIDTH] IN BLOOD BY AUTOMATED COUNT: 13.1 % (ref 12–15)
GFR SERPL CREATININE-BSD FRML MDRD: 98 ML/MIN/1.73
GLOBULIN UR ELPH-MCNC: 3.3 GM/DL
GLUCOSE BLD-MCNC: 102 MG/DL (ref 70–100)
HCT VFR BLD AUTO: 35.4 % (ref 40–52)
HGB BLD-MCNC: 12.4 G/DL (ref 14–18)
HOLD SPECIMEN: NORMAL
HOLD SPECIMEN: NORMAL
LYMPHOCYTES # BLD MANUAL: 1.27 10*3/MM3 (ref 0.72–4.86)
LYMPHOCYTES NFR BLD MANUAL: 1 % (ref 4–12)
LYMPHOCYTES NFR BLD MANUAL: 22 % (ref 15–45)
MCH RBC QN AUTO: 31.7 PG (ref 28–32)
MCHC RBC AUTO-ENTMCNC: 35 G/DL (ref 33–36)
MCV RBC AUTO: 90.5 FL (ref 82–95)
MICROCYTES BLD QL: ABNORMAL
MONOCYTES # BLD AUTO: 0.06 10*3/MM3 (ref 0.19–1.3)
NEUTROPHILS # BLD AUTO: 4.11 10*3/MM3 (ref 1.87–8.4)
NEUTROPHILS NFR BLD MANUAL: 71 % (ref 39–78)
NEUTS VAC BLD QL SMEAR: ABNORMAL
PLAT MORPH BLD: NORMAL
PLATELET # BLD AUTO: 244 10*3/MM3 (ref 130–400)
PMV BLD AUTO: 8.8 FL (ref 6–12)
POTASSIUM BLD-SCNC: 3.8 MMOL/L (ref 3.5–5.3)
PROT SERPL-MCNC: 7.4 G/DL (ref 6.3–8.7)
RBC # BLD AUTO: 3.91 10*6/MM3 (ref 4.8–5.9)
SODIUM BLD-SCNC: 132 MMOL/L (ref 135–145)
VARIANT LYMPHS NFR BLD MANUAL: 6 % (ref 0–5)
WBC NRBC COR # BLD: 5.79 10*3/MM3 (ref 4.8–10.8)
WHOLE BLOOD HOLD SPECIMEN: NORMAL
WHOLE BLOOD HOLD SPECIMEN: NORMAL

## 2019-05-09 PROCEDURE — 99284 EMERGENCY DEPT VISIT MOD MDM: CPT

## 2019-05-09 PROCEDURE — 25010000002 METOCLOPRAMIDE PER 10 MG: Performed by: EMERGENCY MEDICINE

## 2019-05-09 PROCEDURE — 80156 ASSAY CARBAMAZEPINE TOTAL: CPT | Performed by: EMERGENCY MEDICINE

## 2019-05-09 PROCEDURE — 25010000002 DIPHENHYDRAMINE PER 50 MG: Performed by: EMERGENCY MEDICINE

## 2019-05-09 PROCEDURE — 85025 COMPLETE CBC W/AUTO DIFF WBC: CPT | Performed by: EMERGENCY MEDICINE

## 2019-05-09 PROCEDURE — 80053 COMPREHEN METABOLIC PANEL: CPT | Performed by: EMERGENCY MEDICINE

## 2019-05-09 PROCEDURE — 0 IOPAMIDOL PER 1 ML: Performed by: EMERGENCY MEDICINE

## 2019-05-09 PROCEDURE — 85007 BL SMEAR W/DIFF WBC COUNT: CPT | Performed by: EMERGENCY MEDICINE

## 2019-05-09 PROCEDURE — 70496 CT ANGIOGRAPHY HEAD: CPT

## 2019-05-09 PROCEDURE — 70450 CT HEAD/BRAIN W/O DYE: CPT

## 2019-05-09 RX ORDER — ACETAMINOPHEN 500 MG
500 TABLET ORAL ONCE
Status: COMPLETED | OUTPATIENT
Start: 2019-05-09 | End: 2019-05-09

## 2019-05-09 RX ORDER — DIPHENHYDRAMINE HYDROCHLORIDE 50 MG/ML
25 INJECTION INTRAMUSCULAR; INTRAVENOUS ONCE
Status: COMPLETED | OUTPATIENT
Start: 2019-05-09 | End: 2019-05-09

## 2019-05-09 RX ORDER — METOCLOPRAMIDE HYDROCHLORIDE 5 MG/ML
10 INJECTION INTRAMUSCULAR; INTRAVENOUS ONCE
Status: COMPLETED | OUTPATIENT
Start: 2019-05-09 | End: 2019-05-09

## 2019-05-09 RX ORDER — SODIUM CHLORIDE 0.9 % (FLUSH) 0.9 %
10 SYRINGE (ML) INJECTION AS NEEDED
Status: DISCONTINUED | OUTPATIENT
Start: 2019-05-09 | End: 2019-05-14 | Stop reason: HOSPADM

## 2019-05-09 RX ADMIN — IOPAMIDOL 125 ML: 755 INJECTION, SOLUTION INTRAVENOUS at 23:05

## 2019-05-09 RX ADMIN — DIPHENHYDRAMINE HYDROCHLORIDE 25 MG: 50 INJECTION, SOLUTION INTRAMUSCULAR; INTRAVENOUS at 22:03

## 2019-05-09 RX ADMIN — SODIUM CHLORIDE 500 ML: 9 INJECTION, SOLUTION INTRAVENOUS at 22:03

## 2019-05-09 RX ADMIN — ACETAMINOPHEN 500 MG: 500 TABLET, FILM COATED ORAL at 22:08

## 2019-05-09 RX ADMIN — METOCLOPRAMIDE 10 MG: 5 INJECTION, SOLUTION INTRAMUSCULAR; INTRAVENOUS at 22:03

## 2019-05-10 ENCOUNTER — APPOINTMENT (OUTPATIENT)
Dept: CT IMAGING | Facility: HOSPITAL | Age: 44
End: 2019-05-10

## 2019-05-10 ENCOUNTER — APPOINTMENT (OUTPATIENT)
Dept: MRI IMAGING | Facility: HOSPITAL | Age: 44
End: 2019-05-10

## 2019-05-10 PROBLEM — R51.9 NONINTRACTABLE HEADACHE: Status: ACTIVE | Noted: 2019-05-10

## 2019-05-10 LAB
ANION GAP SERPL CALCULATED.3IONS-SCNC: 9 MMOL/L (ref 4–13)
BUN BLD-MCNC: 10 MG/DL (ref 5–21)
BUN/CREAT SERPL: 10.2 (ref 7–25)
CALCIUM SPEC-SCNC: 9.4 MG/DL (ref 8.4–10.4)
CARBAMAZEPINE SERPL-MCNC: 9.1 MCG/ML (ref 4–12)
CHLORIDE SERPL-SCNC: 95 MMOL/L (ref 98–110)
CO2 SERPL-SCNC: 30 MMOL/L (ref 24–31)
CREAT BLD-MCNC: 0.98 MG/DL (ref 0.5–1.4)
DEPRECATED RDW RBC AUTO: 42.8 FL (ref 40–54)
DEPRECATED RDW RBC AUTO: 43.3 FL (ref 40–54)
EOSINOPHIL # BLD MANUAL: 0.16 10*3/MM3 (ref 0–0.7)
EOSINOPHIL NFR BLD MANUAL: 3 % (ref 0–4)
ERYTHROCYTE [DISTWIDTH] IN BLOOD BY AUTOMATED COUNT: 13 % (ref 12–15)
ERYTHROCYTE [DISTWIDTH] IN BLOOD BY AUTOMATED COUNT: 13.2 % (ref 12–15)
GFR SERPL CREATININE-BSD FRML MDRD: 101 ML/MIN/1.73
GLUCOSE BLD-MCNC: 104 MG/DL (ref 70–100)
HCT VFR BLD AUTO: 34.7 % (ref 40–52)
HCT VFR BLD AUTO: 36.1 % (ref 40–52)
HGB BLD-MCNC: 12 G/DL (ref 14–18)
HGB BLD-MCNC: 12.7 G/DL (ref 14–18)
LYMPHOCYTES # BLD MANUAL: 0.65 10*3/MM3 (ref 0.72–4.86)
LYMPHOCYTES # BLD MANUAL: 1.73 10*3/MM3 (ref 0.72–4.86)
LYMPHOCYTES NFR BLD MANUAL: 14.4 % (ref 15–45)
LYMPHOCYTES NFR BLD MANUAL: 32 % (ref 15–45)
LYMPHOCYTES NFR BLD MANUAL: 5 % (ref 4–12)
LYMPHOCYTES NFR BLD MANUAL: 7.2 % (ref 4–12)
MCH RBC QN AUTO: 31.5 PG (ref 28–32)
MCH RBC QN AUTO: 31.8 PG (ref 28–32)
MCHC RBC AUTO-ENTMCNC: 34.6 G/DL (ref 33–36)
MCHC RBC AUTO-ENTMCNC: 35.2 G/DL (ref 33–36)
MCV RBC AUTO: 90.3 FL (ref 82–95)
MCV RBC AUTO: 91.1 FL (ref 82–95)
MONOCYTES # BLD AUTO: 0.27 10*3/MM3 (ref 0.19–1.3)
MONOCYTES # BLD AUTO: 0.32 10*3/MM3 (ref 0.19–1.3)
NEUTROPHILS # BLD AUTO: 3.14 10*3/MM3 (ref 1.87–8.4)
NEUTROPHILS # BLD AUTO: 3.42 10*3/MM3 (ref 1.87–8.4)
NEUTROPHILS NFR BLD MANUAL: 58 % (ref 39–78)
NEUTROPHILS NFR BLD MANUAL: 76.3 % (ref 39–78)
PLAT MORPH BLD: NORMAL
PLAT MORPH BLD: NORMAL
PLATELET # BLD AUTO: 253 10*3/MM3 (ref 130–400)
PLATELET # BLD AUTO: 256 10*3/MM3 (ref 130–400)
PMV BLD AUTO: 8.8 FL (ref 6–12)
PMV BLD AUTO: 9.2 FL (ref 6–12)
POTASSIUM BLD-SCNC: 3.9 MMOL/L (ref 3.5–5.3)
RBC # BLD AUTO: 3.81 10*6/MM3 (ref 4.8–5.9)
RBC # BLD AUTO: 4 10*6/MM3 (ref 4.8–5.9)
RBC MORPH BLD: NORMAL
RBC MORPH BLD: NORMAL
SODIUM BLD-SCNC: 134 MMOL/L (ref 135–145)
VARIANT LYMPHS NFR BLD MANUAL: 2 % (ref 0–5)
VARIANT LYMPHS NFR BLD MANUAL: 2.1 % (ref 0–5)
WBC MORPH BLD: NORMAL
WBC MORPH BLD: NORMAL
WBC NRBC COR # BLD: 4.48 10*3/MM3 (ref 4.8–10.8)
WBC NRBC COR # BLD: 5.42 10*3/MM3 (ref 4.8–10.8)

## 2019-05-10 PROCEDURE — G0378 HOSPITAL OBSERVATION PER HR: HCPCS

## 2019-05-10 PROCEDURE — 99219 PR INITIAL OBSERVATION CARE/DAY 50 MINUTES: CPT | Performed by: NEUROLOGICAL SURGERY

## 2019-05-10 PROCEDURE — 87040 BLOOD CULTURE FOR BACTERIA: CPT | Performed by: PSYCHIATRY & NEUROLOGY

## 2019-05-10 PROCEDURE — 25010000002 PROCHLORPERAZINE 10 MG/2ML SOLUTION: Performed by: PSYCHIATRY & NEUROLOGY

## 2019-05-10 PROCEDURE — 70486 CT MAXILLOFACIAL W/O DYE: CPT

## 2019-05-10 PROCEDURE — 25010000002 KETOROLAC TROMETHAMINE PER 15 MG: Performed by: NEUROLOGICAL SURGERY

## 2019-05-10 PROCEDURE — 85007 BL SMEAR W/DIFF WBC COUNT: CPT | Performed by: PSYCHIATRY & NEUROLOGY

## 2019-05-10 PROCEDURE — 85027 COMPLETE CBC AUTOMATED: CPT | Performed by: EMERGENCY MEDICINE

## 2019-05-10 PROCEDURE — 70553 MRI BRAIN STEM W/O & W/DYE: CPT

## 2019-05-10 PROCEDURE — 85025 COMPLETE CBC W/AUTO DIFF WBC: CPT | Performed by: PSYCHIATRY & NEUROLOGY

## 2019-05-10 PROCEDURE — 0 GADOBENATE DIMEGLUMINE 529 MG/ML SOLUTION: Performed by: PSYCHIATRY & NEUROLOGY

## 2019-05-10 PROCEDURE — 36415 COLL VENOUS BLD VENIPUNCTURE: CPT | Performed by: EMERGENCY MEDICINE

## 2019-05-10 PROCEDURE — 80048 BASIC METABOLIC PNL TOTAL CA: CPT | Performed by: PSYCHIATRY & NEUROLOGY

## 2019-05-10 PROCEDURE — 99222 1ST HOSP IP/OBS MODERATE 55: CPT | Performed by: PSYCHIATRY & NEUROLOGY

## 2019-05-10 PROCEDURE — 63710000001 DEXAMETHASONE PER 0.25 MG: Performed by: NEUROLOGICAL SURGERY

## 2019-05-10 PROCEDURE — A9577 INJ MULTIHANCE: HCPCS | Performed by: PSYCHIATRY & NEUROLOGY

## 2019-05-10 PROCEDURE — 99253 IP/OBS CNSLTJ NEW/EST LOW 45: CPT | Performed by: NURSE PRACTITIONER

## 2019-05-10 RX ORDER — ACETAMINOPHEN 325 MG/1
650 TABLET ORAL EVERY 6 HOURS PRN
Status: DISCONTINUED | OUTPATIENT
Start: 2019-05-10 | End: 2019-05-10 | Stop reason: SDUPTHER

## 2019-05-10 RX ORDER — ACETAMINOPHEN 325 MG/1
650 TABLET ORAL EVERY 4 HOURS PRN
Status: DISCONTINUED | OUTPATIENT
Start: 2019-05-10 | End: 2019-05-14 | Stop reason: HOSPADM

## 2019-05-10 RX ORDER — HYDROCHLOROTHIAZIDE 25 MG/1
25 TABLET ORAL DAILY
Status: DISCONTINUED | OUTPATIENT
Start: 2019-05-10 | End: 2019-05-14 | Stop reason: HOSPADM

## 2019-05-10 RX ORDER — AMITRIPTYLINE HYDROCHLORIDE 25 MG/1
50 TABLET, FILM COATED ORAL NIGHTLY
Status: DISCONTINUED | OUTPATIENT
Start: 2019-05-10 | End: 2019-05-14 | Stop reason: HOSPADM

## 2019-05-10 RX ORDER — CARBAMAZEPINE 200 MG/1
400 TABLET ORAL 2 TIMES DAILY
Status: DISCONTINUED | OUTPATIENT
Start: 2019-05-10 | End: 2019-05-14 | Stop reason: HOSPADM

## 2019-05-10 RX ORDER — DEXAMETHASONE 4 MG/1
4 TABLET ORAL EVERY 6 HOURS SCHEDULED
Status: DISCONTINUED | OUTPATIENT
Start: 2019-05-10 | End: 2019-05-14 | Stop reason: HOSPADM

## 2019-05-10 RX ORDER — TRAMADOL HYDROCHLORIDE 50 MG/1
50 TABLET ORAL EVERY 6 HOURS PRN
Status: DISCONTINUED | OUTPATIENT
Start: 2019-05-10 | End: 2019-05-14 | Stop reason: HOSPADM

## 2019-05-10 RX ORDER — SODIUM CHLORIDE 0.9 % (FLUSH) 0.9 %
3 SYRINGE (ML) INJECTION EVERY 12 HOURS SCHEDULED
Status: DISCONTINUED | OUTPATIENT
Start: 2019-05-10 | End: 2019-05-14 | Stop reason: HOSPADM

## 2019-05-10 RX ORDER — MEPERIDINE HYDROCHLORIDE 25 MG/ML
25 INJECTION INTRAMUSCULAR; INTRAVENOUS; SUBCUTANEOUS ONCE
Status: DISCONTINUED | OUTPATIENT
Start: 2019-05-10 | End: 2019-05-11

## 2019-05-10 RX ORDER — SODIUM CHLORIDE 0.9 % (FLUSH) 0.9 %
3-10 SYRINGE (ML) INJECTION AS NEEDED
Status: DISCONTINUED | OUTPATIENT
Start: 2019-05-10 | End: 2019-05-14 | Stop reason: HOSPADM

## 2019-05-10 RX ORDER — ONDANSETRON 2 MG/ML
4 INJECTION INTRAMUSCULAR; INTRAVENOUS EVERY 6 HOURS PRN
Status: DISCONTINUED | OUTPATIENT
Start: 2019-05-10 | End: 2019-05-14 | Stop reason: HOSPADM

## 2019-05-10 RX ORDER — SODIUM CHLORIDE, SODIUM LACTATE, POTASSIUM CHLORIDE, CALCIUM CHLORIDE 600; 310; 30; 20 MG/100ML; MG/100ML; MG/100ML; MG/100ML
100 INJECTION, SOLUTION INTRAVENOUS CONTINUOUS
Status: DISCONTINUED | OUTPATIENT
Start: 2019-05-10 | End: 2019-05-14 | Stop reason: HOSPADM

## 2019-05-10 RX ORDER — KETOROLAC TROMETHAMINE 30 MG/ML
15 INJECTION, SOLUTION INTRAMUSCULAR; INTRAVENOUS EVERY 6 HOURS PRN
Status: DISCONTINUED | OUTPATIENT
Start: 2019-05-10 | End: 2019-05-14 | Stop reason: HOSPADM

## 2019-05-10 RX ORDER — HYDRALAZINE HYDROCHLORIDE 10 MG/1
10 TABLET, FILM COATED ORAL DAILY
Status: DISCONTINUED | OUTPATIENT
Start: 2019-05-10 | End: 2019-05-14 | Stop reason: HOSPADM

## 2019-05-10 RX ORDER — AMITRIPTYLINE HYDROCHLORIDE 75 MG/1
75 TABLET, FILM COATED ORAL NIGHTLY
COMMUNITY

## 2019-05-10 RX ADMIN — HYDROCHLOROTHIAZIDE 25 MG: 25 TABLET ORAL at 09:21

## 2019-05-10 RX ADMIN — PROCHLORPERAZINE EDISYLATE 5 MG: 5 INJECTION INTRAMUSCULAR; INTRAVENOUS at 19:46

## 2019-05-10 RX ADMIN — PROCHLORPERAZINE EDISYLATE 5 MG: 5 INJECTION INTRAMUSCULAR; INTRAVENOUS at 13:30

## 2019-05-10 RX ADMIN — GADOBENATE DIMEGLUMINE 20 ML: 529 INJECTION, SOLUTION INTRAVENOUS at 16:15

## 2019-05-10 RX ADMIN — AMITRIPTYLINE HYDROCHLORIDE 50 MG: 25 TABLET, FILM COATED ORAL at 21:11

## 2019-05-10 RX ADMIN — ACETAMINOPHEN 650 MG: 325 TABLET, FILM COATED ORAL at 09:20

## 2019-05-10 RX ADMIN — CARBAMAZEPINE 400 MG: 200 TABLET ORAL at 21:11

## 2019-05-10 RX ADMIN — CARBAMAZEPINE 400 MG: 200 TABLET ORAL at 09:20

## 2019-05-10 RX ADMIN — VALPROATE SODIUM 1000 MG: 100 INJECTION, SOLUTION INTRAVENOUS at 02:34

## 2019-05-10 RX ADMIN — SODIUM CHLORIDE, POTASSIUM CHLORIDE, SODIUM LACTATE AND CALCIUM CHLORIDE 100 ML/HR: 600; 310; 30; 20 INJECTION, SOLUTION INTRAVENOUS at 02:34

## 2019-05-10 RX ADMIN — ACETAMINOPHEN 650 MG: 325 TABLET, FILM COATED ORAL at 15:52

## 2019-05-10 RX ADMIN — HYDRALAZINE HYDROCHLORIDE 10 MG: 10 TABLET, FILM COATED ORAL at 09:20

## 2019-05-10 RX ADMIN — KETOROLAC TROMETHAMINE 15 MG: 30 INJECTION, SOLUTION INTRAMUSCULAR at 21:11

## 2019-05-10 RX ADMIN — ACETAMINOPHEN 650 MG: 325 TABLET, FILM COATED ORAL at 05:34

## 2019-05-10 RX ADMIN — SODIUM CHLORIDE, PRESERVATIVE FREE 3 ML: 5 INJECTION INTRAVENOUS at 21:11

## 2019-05-10 RX ADMIN — SODIUM CHLORIDE, PRESERVATIVE FREE 3 ML: 5 INJECTION INTRAVENOUS at 09:21

## 2019-05-10 RX ADMIN — DEXAMETHASONE 4 MG: 4 TABLET ORAL at 21:11

## 2019-05-10 NOTE — PLAN OF CARE
Problem: Patient Care Overview  Goal: Plan of Care Review  Outcome: Ongoing (interventions implemented as appropriate)   05/10/19 1738   Coping/Psychosocial   Plan of Care Reviewed With patient;significant other   Plan of Care Review   Progress improving   OTHER   Outcome Summary MRI done, results pending. C/O HA with some relief with compazine.     Goal: Individualization and Mutuality  Outcome: Ongoing (interventions implemented as appropriate)    Goal: Discharge Needs Assessment  Outcome: Ongoing (interventions implemented as appropriate)    Goal: Interprofessional Rounds/Family Conf  Outcome: Ongoing (interventions implemented as appropriate)      Problem: Pain, Acute (Adult)  Goal: Acceptable Pain Control/Comfort Level  Outcome: Ongoing (interventions implemented as appropriate)

## 2019-05-10 NOTE — PROGRESS NOTES
Call by radiology.  Further read of Head CT concerning for possible infection in sinus cavity in previous surgical bed.  Recommended ENT consult.    H & P to follow    Moise Crawley MD  05/10/19  8:48 AM

## 2019-05-10 NOTE — CONSULTS
"[facility] ([place]) Consult Note:    Referring Provider: Moise Crawley MD    Reason for Consultation: Headaches, possible sinus infection    Patient Care Team:  Haider Santacruz PA-C as PCP - General (Physician Assistant)  [facility]    Chief complaint: Headache    Subjective .     History of present illness:  Chirag Mata is a  43 y.o. male with a history of bilateral frontal meningioma resections in 2015.  He presented to [facility] ER on 5/9/19 with complaints of a severe headache.  He states this began 6 days ago and has been progressively worsened.  He states it initially started behind his eyes and now feels like \"someone is squeezing my brain.\"  He currently rates his pain an 8 out of 10 on the pain scale.  Nothing makes this better or worse.  He reports a history of frequent headaches, but states he has never had a headache like this before.  He denies any nasal congestion, nasal drainage, or history of any frequent sinus infections.  Approximately 1 month ago, Dr. Vásquez performed an I&D of a right forehead abscess.  He was placed on Keflex.  The patient states this has resolved.  He has had a CT scan of the head dated May 9, 2019 revealing a mass in the floor of the anterior cranial fossa extending through the frontal floor of the frontal cranial fossa and into the sinuses.    Review of Systems  Review of Systems   Constitutional: Negative for chills and fever.   HENT: Negative for congestion, rhinorrhea, sinus pressure, sinus pain, sore throat, trouble swallowing and voice change.    Respiratory: Negative for cough and shortness of breath.    Cardiovascular: Negative for chest pain.   Gastrointestinal: Negative for nausea and vomiting.   Neurological: Positive for headaches. Negative for dizziness, syncope and weakness.       History  Past Medical History:   Diagnosis Date   • Drug abuse (CMS/[facility])    • Hypertension    ,   Past Surgical History:   Procedure Laterality Date   • TESTICLE " TORSION REPAIR     , History reviewed. No pertinent family history.,   Social History     Tobacco Use   • Smoking status: Current Every Day Smoker     Packs/day: 1.00   Substance Use Topics   • Alcohol use: Yes     Comment: ocassionally   • Drug use: No   ,   Medications Prior to Admission   Medication Sig Dispense Refill Last Dose   • amitriptyline (ELAVIL) 50 MG tablet Take 1 1/2 tablets at HS 45 tablet 5    • carBAMazepine (TEGRETOL) 200 MG tablet Take 2 tablets by mouth 2 (Two) Times a Day. 120 tablet 2    • hydrALAZINE (APRESOLINE) 10 MG tablet Take 10 mg by mouth Daily.      • hydrochlorothiazide (HYDRODIURIL) 25 MG tablet Take 25 mg by mouth Daily.   Taking    and Allergies:  Bactrim [sulfamethoxazole-trimethoprim]    Objective     Vital Signs   Temp:  [98.7 °F (37.1 °C)-99 °F (37.2 °C)] 98.8 °F (37.1 °C)  Heart Rate:  [] 96  Resp:  [14-17] 16  BP: (130-145)/(72-92) 130/83    Physical Exam:   Physical Exam   Constitutional: He is oriented to person, place, and time. He appears well-developed and well-nourished. He is sleeping and cooperative. No distress.   HENT:   Head: Normocephalic and atraumatic.   Right Ear: External ear normal.   Left Ear: External ear normal.   Nose: No nasal deformity. Right sinus exhibits frontal sinus tenderness. Right sinus exhibits no maxillary sinus tenderness. Left sinus exhibits frontal sinus tenderness. Left sinus exhibits no maxillary sinus tenderness.   Mouth/Throat: Uvula is midline, oropharynx is clear and moist and mucous membranes are normal.   Eyes: Conjunctivae, EOM and lids are normal.   Neck: Phonation normal. Neck supple.   Pulmonary/Chest: Breath sounds normal. No respiratory distress.   Lymphadenopathy:     He has no cervical adenopathy.   Neurological: He is oriented to person, place, and time. He has normal strength.   Skin: Skin is warm and intact.   Psychiatric: He has a normal mood and affect. His speech is normal and behavior is normal.       Results  Review:     Lab Results (last 24 hours)     Procedure Component Value Units Date/Time    CBC & Differential [[id number]] Collected:  05/10/19 0837    Specimen:  Blood Updated:  05/10/19 0932    Narrative:       The following orders were created for panel order CBC & Differential.  Procedure                               Abnormality         Status                     ---------                               -----------         ------                     CBC Auto Differential[[id number]]        Abnormal            Final result                 Please view results for these tests on the individual orders.    CBC Auto Differential [[id number]]  (Abnormal) Collected:  05/10/19 0837    Specimen:  Blood Updated:  05/10/19 0932     WBC 4.48 10*3/mm3      RBC 4.00 10*6/mm3      Hemoglobin 12.7 g/dL      Hematocrit 36.1 %      MCV 90.3 fL      MCH 31.8 pg      MCHC 35.2 g/dL      RDW 13.0 %      RDW-SD 42.8 fl      MPV 8.8 fL      Platelets 253 10*3/mm3     Narrative:       The previously reported component NRBC is no longer being reported.    Manual Differential [[id number]]  (Abnormal) Collected:  05/10/19 0837    Specimen:  Blood Updated:  05/10/19 0932     Neutrophil % 76.3 %      Lymphocyte % 14.4 %      Monocyte % 7.2 %      Atypical Lymphocyte % 2.1 %      Neutrophils Absolute 3.42 10*3/mm3      Lymphocytes Absolute 0.65 10*3/mm3      Monocytes Absolute 0.32 10*3/mm3      RBC Morphology Normal     WBC Morphology Normal     Platelet Morphology Normal    Basic Metabolic Panel [[id number]]  (Abnormal) Collected:  05/10/19 0837    Specimen:  Blood Updated:  05/10/19 0911     Glucose 104 mg/dL      BUN 10 mg/dL      Creatinine 0.98 mg/dL      Sodium 134 mmol/L      Potassium 3.9 mmol/L      Chloride 95 mmol/L      CO2 30.0 mmol/L      Calcium 9.4 mg/dL      eGFR  African Amer 101 mL/min/1.73      BUN/Creatinine Ratio 10.2     Anion Gap 9.0 mmol/L     Narrative:       GFR Normal >60  Chronic Kidney Disease <60  Kidney Failure <15     Blood Culture - Blood, Hand, Right [[id number]] Collected:  05/10/19 0844    Specimen:  Blood from Hand, Right Updated:  05/10/19 0858    Blood Culture - Blood, Arm, Right [[id number]] Collected:  05/10/19 0837    Specimen:  Blood from Arm, Right Updated:  05/10/19 0858    CBC & Differential [[id number]] Collected:  05/10/19 0454    Specimen:  Blood Updated:  05/10/19 0618    Narrative:       The following orders were created for panel order CBC & Differential.  Procedure                               Abnormality         Status                     ---------                               -----------         ------                     Manual Differential[[id number]]                                                         CBC Auto Differential[[id number]]        Abnormal            Final result                 Please view results for these tests on the individual orders.    CBC Auto Differential [[id number]]  (Abnormal) Collected:  05/10/19 0454    Specimen:  Blood Updated:  05/10/19 0618     WBC 5.42 10*3/mm3      RBC 3.81 10*6/mm3      Hemoglobin 12.0 g/dL      Hematocrit 34.7 %      MCV 91.1 fL      MCH 31.5 pg      MCHC 34.6 g/dL      RDW 13.2 %      RDW-SD 43.3 fl      MPV 9.2 fL      Platelets 256 10*3/mm3     Manual Differential [[id number]]  (Normal) Collected:  05/10/19 0454    Specimen:  Blood Updated:  05/10/19 0618     Neutrophil % 58.0 %      Lymphocyte % 32.0 %      Monocyte % 5.0 %      Eosinophil % 3.0 %      Atypical Lymphocyte % 2.0 %      Neutrophils Absolute 3.14 10*3/mm3      Lymphocytes Absolute 1.73 10*3/mm3      Monocytes Absolute 0.27 10*3/mm3      Eosinophils Absolute 0.16 10*3/mm3      RBC Morphology Normal     WBC Morphology Normal     Platelet Morphology Normal    Carbamazepine Level, Total [[id number]]  (Normal) Collected:  05/09/19 2205    Specimen:  Blood Updated:  05/10/19 0003     Carbamazepine Level 9.1 mcg/mL     [place] Draw [[id number]] Collected:  05/09/19 2205    Specimen:  Blood  Updated:  05/09/19 2315    Narrative:       The following orders were created for panel order [place] Draw.  Procedure                               Abnormality         Status                     ---------                               -----------         ------                     Light Blue Top[[id number]]                                   Final result               Green Top (Gel)[[id number]]                                  Final result               Lavender Top[[id number]]                                     Final result               Red Top[[id number]]                                          Final result                 Please view results for these tests on the individual orders.    Light Blue Top [[id number]] Collected:  05/09/19 2205    Specimen:  Blood Updated:  05/09/19 2315     Extra Tube hold for add-on     Comment: Auto resulted       Green Top (Gel) [[id number]] Collected:  05/09/19 2205    Specimen:  Blood Updated:  05/09/19 2315     Extra Tube Hold for add-ons.     Comment: Auto resulted.       Lavender Top [[id number]] Collected:  05/09/19 2205    Specimen:  Blood Updated:  05/09/19 2315     Extra Tube hold for add-on     Comment: Auto resulted       Red Top [[id number]] Collected:  05/09/19 2205    Specimen:  Blood Updated:  05/09/19 2315     Extra Tube Hold for add-ons.     Comment: Auto resulted.       Manual Differential [[id number]]  (Abnormal) Collected:  05/09/19 2205    Specimen:  Blood Updated:  05/09/19 2239     Neutrophil % 71.0 %      Lymphocyte % 22.0 %      Monocyte % 1.0 %      Atypical Lymphocyte % 6.0 %      Neutrophils Absolute 4.11 10*3/mm3      Lymphocytes Absolute 1.27 10*3/mm3      Monocytes Absolute 0.06 10*3/mm3      Microcytes Slight/1+     Vacuolated Neutrophils Slight/1+     Platelet Morphology Normal    CBC & Differential [[id number]] Collected:  05/09/19 2205    Specimen:  Blood Updated:  05/09/19 2239    Narrative:       The following orders were created for panel order CBC &  Differential.  Procedure                               Abnormality         Status                     ---------                               -----------         ------                     CBC Auto Differential[[id number]]        Abnormal            Final result                 Please view results for these tests on the individual orders.    CBC Auto Differential [[id number]]  (Abnormal) Collected:  05/09/19 2205    Specimen:  Blood Updated:  05/09/19 2239     WBC 5.79 10*3/mm3      RBC 3.91 10*6/mm3      Hemoglobin 12.4 g/dL      Hematocrit 35.4 %      MCV 90.5 fL      MCH 31.7 pg      MCHC 35.0 g/dL      RDW 13.1 %      RDW-SD 43.4 fl      MPV 8.8 fL      Platelets 244 10*3/mm3     Comprehensive Metabolic Panel [[id number]]  (Abnormal) Collected:  05/09/19 2205    Specimen:  Blood Updated:  05/09/19 2227     Glucose 102 mg/dL      BUN 11 mg/dL      Creatinine 1.01 mg/dL      Sodium 132 mmol/L      Potassium 3.8 mmol/L      Chloride 91 mmol/L      CO2 33.0 mmol/L      Calcium 9.3 mg/dL      Total Protein 7.4 g/dL      Albumin 4.10 g/dL      ALT (SGPT) 22 U/L      AST (SGOT) 27 U/L      Alkaline Phosphatase 64 U/L      Total Bilirubin 0.3 mg/dL      eGFR  African Amer 98 mL/min/1.73      Globulin 3.3 gm/dL      A/G Ratio 1.2 g/dL      BUN/Creatinine Ratio 10.9     Anion Gap 8.0 mmol/L     Narrative:       GFR Normal >60  Chronic Kidney Disease <60  Kidney Failure <15         Imaging Results (last 24 hours)     Procedure Component Value Units Date/Time    SCANNED - IMAGING [[id number]] Resulted:  05/09/19      Updated:  05/10/19 0847    CT Head Without Contrast [[id number]] Collected:  05/10/19 0735     Updated:  05/10/19 0846    Narrative:       EXAMINATION:  CT HEAD WO CONTRAST-  5/9/2019 10:50 PM CDT     HISTORY: Prior history of frontal meningioma removal in 2015. New severe  headache. Evaluate for recurrence.     TECHNIQUE: Multiple axial images were obtained through the brain without  contrast infusion. Multiplanar  images were reconstructed.     DLP: 822 mGy-cm. Automated dosage control was utilized.     COMPARISON: No comparison study.     FINDINGS: There is abnormal mass effect along the floor of the frontal  cranial fossa that extends through the floor of the frontal sinus and  into the sphenoid and ethmoid sinus region. This measures about 2.8 cm  transversely 3 cm in AP dimension and about 2.5 cm cephalocaudal. There  is pre-existing encephalomalacia/gliosis in the frontal lobes  bilaterally anteriorly and inferiorly. The ventricular system is  nondilated. There is no hemorrhage. The maxillary sinuses are clear. The  frontal sinuses are clear. The mastoid air cells are clear.       Impression:       1. Mass in the floor of the anterior cranial fossa extending through the  floor of the frontal cranial fossa and into the sphenoid/ethmoid sinus  region. This is predominantly soft tissue density and may represent  recurrent tumor. Infection is also in the differential. The patient's  previous diagnosis is meningioma.  2. Pre-existing encephalomalacia/gliosis in the anterior and inferior  frontal lobes.     This report was finalized on 05/10/2019 08:43 by Dr. Josef Hung MD.    CT venogram head w contrast [[id number]] Collected:  05/10/19 0743     Updated:  05/10/19 0845    Narrative:       EXAMINATION:  CT VENOGRAM HEAD WITH CONTRAST-  5/9/2019 10:50 PM CDT     HISTORY: Dural venous sinus thrombosis suspected. Recurrent mass.  Abnormal CT.     COMPARISON: 10/15/2018.     TECHNIQUE: Spiral CT angiography/mammography was performed of the brain.  Dual phases were obtained. Sagittal, coronal and 3-D images were  reconstructed. DLP: 1673 mGy-cm.     FINDINGS: There is a mass that appears to be arising in the floor of the  anterior cranial fossa that is eroding the bone of the floor of the  frontal cranial fossa and extending into the sphenoid and ethmoid sinus  region. There is enhancement of the mass with scattered areas of  low  density within the mass. The patient reportedly had a previous  meningioma. The mass measures about 3 cm transversely, 2.9 cm in  cephalocaudal dimension and about 3.2 cm and AP dimension. There is  encephalomalacia and gliosis in the anterior and inferior frontal lobes  bilaterally that is present on previous studies.     The vertebrobasilar arteries are intact. The internal carotid arteries  are intact. The anterior, middle and posterior cerebral arteries are  intact. The superior sagittal sinus is patent but somewhat small in  caliber. The transverse sinuses are patent but also small in caliber.  The sigmoid sinuses are patent bilaterally. The right side is dominant.  There is a partially empty appearance of the sella turcica.       Impression:       1. Suggestion of recurrent mass along the floor of the frontal cranial  fossa extending through the floor and into the sphenoid and ethmoid  sinus region. There is enhancement of the mass with scattered areas of  low density. Infection is in the differential, including fungal  infection. Given prior history of meningioma, this may represent  recurrent meningioma, although the appearance is somewhat atypical.  Meningioma typically enhances homogeneously. Other tumors cannot be  excluded.  2. No major arterial branch occlusions. The venous sinuses are patent  but somewhat small in caliber. There is also a partially empty  appearance of the sella turcica.     The findings were discussed with Dr. Crawley.  This report was finalized on 05/10/2019 08:42 by Dr. Josef Hung MD.            Assessment/Plan       Nonintractable headache      I will discuss this patient with Dr. Brewer.        Renee Archibald, ROBERT  05/10/19  12:08 PM

## 2019-05-10 NOTE — H&P
Neurology History & Physical    Indication for Admission: headache    History of present illness: This is a 43-year-old -American male with a history of bilateral frontal meningioma resections in 2015.  He initially had a poor reaction to Keppra and subsequent to this was not taking antiepileptics.  I saw him in the fall 2018.  Specifically in October he presented having several generalized tonic-clonic seizures.  He was initially loaded with Depakote after an MRI of the brain and EEG were unremarkable.  He was followed up in the neurology clinic subsequent to this and was transitioned to Tegretol therapy.  He has a chronic daily headache that is managed with Elavil and Tegretol.  He also is a recovering opiate addict and is currently attending [facility] and is on restrictions for any mind altering medications and specifically noted is avoidance of all opiate-based medications.    I received a call last night from his girlfriend who stated that for 6 days he has been suffering a vice-like holocephalic headache.  On the 10th of last month he had a skin infection that sounded like a focal cellulitis over the right eye.  He was seen and had the lesion excised.  He received 10 days of Keflex.  Reportedly he was compliant with this.  Since that time he has had no fevers, chills, night sweats, nor neck stiffness.  He has had no light or sound sensitivity.  He denies nausea and vomiting.  Once again his main complaint now is that he is suffering from a worsening headache that is above his baseline.  He was seen in our emergency room last night and head CT shows evidence of a masslike lesion in the frontal area that may extend to the sinuses.  Differential diagnosis includes tumor recurrence versus infection.  He denies any fevers and he has no elevation of his white count.  He does not remember his last seizure but it was quite some time ago.  He denies any neurologic symptoms including diplopia, blurred vision,  speech changes, facial drooping, unilateral weakness, unilateral numbness.    Past Medical History:   Diagnosis Date   • Drug abuse (CMS/HCC)    • Hypertension        Allergies   Allergen Reactions   • Bactrim [Sulfamethoxazole-Trimethoprim] Other (See Comments)     SHAKES VOMITTING       Medications Prior to Admission   Medication Sig Dispense Refill Last Dose   • amitriptyline (ELAVIL) 50 MG tablet Take 1 1/2 tablets at HS 45 tablet 5    • carBAMazepine (TEGRETOL) 200 MG tablet Take 2 tablets by mouth 2 (Two) Times a Day. 120 tablet 2    • hydrALAZINE (APRESOLINE) 10 MG tablet Take 10 mg by mouth Daily.      • hydrochlorothiazide (HYDRODIURIL) 25 MG tablet Take 25 mg by mouth Daily.   Taking       Social History     Socioeconomic History   • Marital status: Single     Spouse name: Not on file   • Number of children: Not on file   • Years of education: Not on file   • Highest education level: Not on file   Tobacco Use   • Smoking status: Current Every Day Smoker     Packs/day: 1.00   Substance and Sexual Activity   • Alcohol use: Yes     Comment: ocassionally   • Drug use: No   • Sexual activity: Yes     Partners: Female     History reviewed. No pertinent family history.    Review of Systems  Review of Systems -a 14 point review of system was performed was positive only for headache as noted above.    Vital Signs   Temp:  [98.7 °F (37.1 °C)-99 °F (37.2 °C)] 99 °F (37.2 °C)  Heart Rate:  [] 105  Resp:  [14-17] 14  BP: (130-145)/(72-92) 130/72    General Exam:  Head:  Normal cephalic, atraumatic  HEENT:  Neck supple  Fundoscopic Exam:  No signs of disc edema  CVS:  Regular rate and rhythm.  No murmurs  Carotid Examination:  No bruits  Lungs:  Clear to auscultation  Abdomen:  Non-tender, Non-distended  Extremities:  No signs of peripheral edema  Skin:  No rashes    Neurologic Exam:    Mental Status:    -Awake, Alert, Oriented X 3  -No word finding difficulties  -No aphasia  -No dysarthria  -Follows simple and  complex commands    CN II:  Visual fields full.  Pupils equally reactive to light  CN III, IV, VI:  Extraocular Muscles full with no signs of nystagmus  CN V:  Facial sensory is symmetric with no asymmetries.  CN VII:  Facial motor symmetric  CN VIII:  Gross hearing intact bilaterally  CN IX:  Palate elevates symmetrically  CN X:  Palate elevates symmetrically  CN XI:  Shoulder shrug symmetric  CN XII:  Tongue is midline on protrusion    Motor: (strength out of 5:  1= minimal movement, 2 = movement in plane of gravity, 3 = movement against gravity, 4 = movement against some resistance, 5 = full strength)    -Right Upper Ext: Proximal: 5 Distal: 5  -Left Upper Ext: Proximal: 5 Distal: 5    -Right Lower Ext: Proximal: 5 Distal: 5  -Left Lower Ext: Proximal: 5 Distal: 5    DTR:  -Right   Bicep: 2+ Tricep: 2+ Brachoradialis: 2+   Patella: 2+ Ankle: 2+ Neg Babinski  -Left   Bicep: 2+ Tricep: 2+ Brachoradialis: 2+   Patella: 2+ Ankle: 2+ Neg Babinski    Sensory:  -Intact to light touch, pinprick, temperature, pain, and proprioception    Coordination:  -Finger to nose intact  -Heel to shin intact  -No ataxia    Gait  -No signs of ataxia  -ambulates unassisted      Results Review:  Lab Results (last 7 days)     Procedure Component Value Units Date/Time    CBC & Differential [[id number]] Collected:  05/10/19 0837    Specimen:  Blood Updated:  05/10/19 0932    Narrative:       The following orders were created for panel order CBC & Differential.  Procedure                               Abnormality         Status                     ---------                               -----------         ------                     CBC Auto Differential[[id number]]        Abnormal            Final result                 Please view results for these tests on the individual orders.    CBC Auto Differential [[id number]]  (Abnormal) Collected:  05/10/19 0837    Specimen:  Blood Updated:  05/10/19 0932     WBC 4.48 10*3/mm3      RBC 4.00 10*6/mm3       Hemoglobin 12.7 g/dL      Hematocrit 36.1 %      MCV 90.3 fL      MCH 31.8 pg      MCHC 35.2 g/dL      RDW 13.0 %      RDW-SD 42.8 fl      MPV 8.8 fL      Platelets 253 10*3/mm3     Narrative:       The previously reported component NRBC is no longer being reported.    Manual Differential [[id number]]  (Abnormal) Collected:  05/10/19 0837    Specimen:  Blood Updated:  05/10/19 0932     Neutrophil % 76.3 %      Lymphocyte % 14.4 %      Monocyte % 7.2 %      Atypical Lymphocyte % 2.1 %      Neutrophils Absolute 3.42 10*3/mm3      Lymphocytes Absolute 0.65 10*3/mm3      Monocytes Absolute 0.32 10*3/mm3      RBC Morphology Normal     WBC Morphology Normal     Platelet Morphology Normal    Basic Metabolic Panel [[id number]]  (Abnormal) Collected:  05/10/19 0837    Specimen:  Blood Updated:  05/10/19 0911     Glucose 104 mg/dL      BUN 10 mg/dL      Creatinine 0.98 mg/dL      Sodium 134 mmol/L      Potassium 3.9 mmol/L      Chloride 95 mmol/L      CO2 30.0 mmol/L      Calcium 9.4 mg/dL      eGFR  African Amer 101 mL/min/1.73      BUN/Creatinine Ratio 10.2     Anion Gap 9.0 mmol/L     Narrative:       GFR Normal >60  Chronic Kidney Disease <60  Kidney Failure <15    Blood Culture - Blood, Hand, Right [[id number]] Collected:  05/10/19 0844    Specimen:  Blood from Hand, Right Updated:  05/10/19 0858    Blood Culture - Blood, Arm, Right [[id number]] Collected:  05/10/19 0837    Specimen:  Blood from Arm, Right Updated:  05/10/19 0858    CBC & Differential [[id number]] Collected:  05/10/19 0454    Specimen:  Blood Updated:  05/10/19 0618    Narrative:       The following orders were created for panel order CBC & Differential.  Procedure                               Abnormality         Status                     ---------                               -----------         ------                     Manual Differential[[id number]]                                                         CBC Auto Differential[[id number]]         Abnormal            Final result                 Please view results for these tests on the individual orders.    CBC Auto Differential [[id number]]  (Abnormal) Collected:  05/10/19 0454    Specimen:  Blood Updated:  05/10/19 0618     WBC 5.42 10*3/mm3      RBC 3.81 10*6/mm3      Hemoglobin 12.0 g/dL      Hematocrit 34.7 %      MCV 91.1 fL      MCH 31.5 pg      MCHC 34.6 g/dL      RDW 13.2 %      RDW-SD 43.3 fl      MPV 9.2 fL      Platelets 256 10*3/mm3     Manual Differential [[id number]]  (Normal) Collected:  05/10/19 0454    Specimen:  Blood Updated:  05/10/19 0618     Neutrophil % 58.0 %      Lymphocyte % 32.0 %      Monocyte % 5.0 %      Eosinophil % 3.0 %      Atypical Lymphocyte % 2.0 %      Neutrophils Absolute 3.14 10*3/mm3      Lymphocytes Absolute 1.73 10*3/mm3      Monocytes Absolute 0.27 10*3/mm3      Eosinophils Absolute 0.16 10*3/mm3      RBC Morphology Normal     WBC Morphology Normal     Platelet Morphology Normal    Carbamazepine Level, Total [[id number]]  (Normal) Collected:  05/09/19 2205    Specimen:  Blood Updated:  05/10/19 0003     Carbamazepine Level 9.1 mcg/mL     [place] Draw [[id number]] Collected:  05/09/19 2205    Specimen:  Blood Updated:  05/09/19 2315    Narrative:       The following orders were created for panel order [place] Draw.  Procedure                               Abnormality         Status                     ---------                               -----------         ------                     Light Blue Top[[id number]]                                   Final result               Green Top (Gel)[[id number]]                                  Final result               Lavender Top[[id number]]                                     Final result               Red Top[[id number]]                                          Final result                 Please view results for these tests on the individual orders.    Light Blue Top [[id number]] Collected:  05/09/19 2205    Specimen:  Blood  Updated:  05/09/19 2315     Extra Tube hold for add-on     Comment: Auto resulted       Green Top (Gel) [[id number]] Collected:  05/09/19 2205    Specimen:  Blood Updated:  05/09/19 2315     Extra Tube Hold for add-ons.     Comment: Auto resulted.       Lavender Top [[id number]] Collected:  05/09/19 2205    Specimen:  Blood Updated:  05/09/19 2315     Extra Tube hold for add-on     Comment: Auto resulted       Red Top [[id number]] Collected:  05/09/19 2205    Specimen:  Blood Updated:  05/09/19 2315     Extra Tube Hold for add-ons.     Comment: Auto resulted.       Manual Differential [[id number]]  (Abnormal) Collected:  05/09/19 2205    Specimen:  Blood Updated:  05/09/19 2239     Neutrophil % 71.0 %      Lymphocyte % 22.0 %      Monocyte % 1.0 %      Atypical Lymphocyte % 6.0 %      Neutrophils Absolute 4.11 10*3/mm3      Lymphocytes Absolute 1.27 10*3/mm3      Monocytes Absolute 0.06 10*3/mm3      Microcytes Slight/1+     Vacuolated Neutrophils Slight/1+     Platelet Morphology Normal    CBC & Differential [[id number]] Collected:  05/09/19 2205    Specimen:  Blood Updated:  05/09/19 2239    Narrative:       The following orders were created for panel order CBC & Differential.  Procedure                               Abnormality         Status                     ---------                               -----------         ------                     CBC Auto Differential[[id number]]        Abnormal            Final result                 Please view results for these tests on the individual orders.    CBC Auto Differential [[id number]]  (Abnormal) Collected:  05/09/19 2205    Specimen:  Blood Updated:  05/09/19 2239     WBC 5.79 10*3/mm3      RBC 3.91 10*6/mm3      Hemoglobin 12.4 g/dL      Hematocrit 35.4 %      MCV 90.5 fL      MCH 31.7 pg      MCHC 35.0 g/dL      RDW 13.1 %      RDW-SD 43.4 fl      MPV 8.8 fL      Platelets 244 10*3/mm3     Comprehensive Metabolic Panel [[id number]]  (Abnormal) Collected:  05/09/19 2205     Specimen:  Blood Updated:  05/09/19 2227     Glucose 102 mg/dL      BUN 11 mg/dL      Creatinine 1.01 mg/dL      Sodium 132 mmol/L      Potassium 3.8 mmol/L      Chloride 91 mmol/L      CO2 33.0 mmol/L      Calcium 9.3 mg/dL      Total Protein 7.4 g/dL      Albumin 4.10 g/dL      ALT (SGPT) 22 U/L      AST (SGOT) 27 U/L      Alkaline Phosphatase 64 U/L      Total Bilirubin 0.3 mg/dL      eGFR  African Amer 98 mL/min/1.73      Globulin 3.3 gm/dL      A/G Ratio 1.2 g/dL      BUN/Creatinine Ratio 10.9     Anion Gap 8.0 mmol/L     Narrative:       GFR Normal >60  Chronic Kidney Disease <60  Kidney Failure <15        Patient Active Problem List   Diagnosis   • Seizure (CMS/HCC)   • History of resection of meningioma   • Chronic intractable headache   • MARIA LUISA on CPAP   • Nonintractable headache       CT of head without contrast reviewed by me.  I do agree there is an abnormal mass along the floor of the frontal cranial fossa that extends into the frontal sinuses.  Above this is a pre-existing area of encephalomalacia that is likely postsurgical from the previous meningioma resections.  CT venogram unremarkable.    Impression:    1.  Holocephalic headache that is likely multifactorial in nature.  I find no evidence of active hemorrhage on examination.  He was found to have a mass which may or may not be contributing to this.  Symptomatic treatment of this will remain difficult secondary to his restrictions on mind altering medications.  I think would be reasonable to use Compazine and tramadol.  I explained to the patient that my goal is the diagnosis of the mass lesion and a treatment plan.  It may be that we do not obtain complete pain-free him given the fact that he does have a chronic headache and I have limited options secondary to the contract he has with [facility].  2.  Frontal mass lesion with extension into the sinuses -difficult to say the etiology behind this.  I would like to consult both  neurosurgery and ENT.  I find minimal evidence of this being an infection given the fact that he is afebrile with no elevation of white count as well.  If either of the consultants believe that this may represent infection and believe that a consult to infectious disease is warranted please not hesitate to request this consult or as stated in your note that you wish for me to do this.  3.  History of drug dependence  4.  S/P bilateral meningioma resection  5.  MARIA LUISA on CPAP    Plan:  · ENT consult  · Neurosurgery consult  · Blood cultures pending  · MRI Brain with and without pending  · Depacon given for headache relief  · Will add compazine for symptomatic treatment  · Avoid mind altering medications in accordance with pain contract patient made with [facility]  · Tobacco cessation counseling      Moise Crawley MD  05/10/19  10:48 AM

## 2019-05-10 NOTE — ED PROVIDER NOTES
Subjective   History of Present Illness    43-year-old male presenting with headache.    Patient notes onset of headache approximately 5 days ago, gradual onset, gradually worsening, severe, holocephalic the previous headache.  Patient does note a preceding small facial abscess with surrounding swelling which has since resolved.  Patient does have a history of brain tumor requiring resection.    Patient denies any associated weakness, numbness, tingling, double vision, blurry vision, confusion, trauma.  Patient denies any fevers, neck pain, neck stiffness.    Review of Systems   Constitutional: Negative for fever.   HENT: Positive for congestion. Negative for sinus pain.    Eyes: Negative for pain.   Respiratory: Negative for shortness of breath.    Cardiovascular: Negative for chest pain.   Gastrointestinal: Negative for abdominal pain.   Genitourinary: Negative for flank pain.   Musculoskeletal: Negative for back pain.   Skin: Negative for wound.   Neurological: Positive for headaches. Negative for syncope and weakness.   Psychiatric/Behavioral: The patient is not hyperactive.        Past Medical History:   Diagnosis Date   • Drug abuse (CMS/[facility])    • Hypertension        Allergies   Allergen Reactions   • Bactrim [Sulfamethoxazole-Trimethoprim] Other (See Comments)     SHAKES VOMITTING         Past Surgical History:   Procedure Laterality Date   • TESTICLE TORSION REPAIR         History reviewed. No pertinent family history.    Social History     Socioeconomic History   • Marital status: Single     Spouse name: Not on file   • Number of children: Not on file   • Years of education: Not on file   • Highest education level: Not on file   Tobacco Use   • Smoking status: Current Every Day Smoker     Packs/day: 1.00   Substance and Sexual Activity   • Alcohol use: Yes     Comment: ocassionally   • Drug use: No   • Sexual activity: Yes     Partners: Female           Objective   Physical Exam   Constitutional: He appears  well-developed and well-nourished.   HENT:   Head: Normocephalic and atraumatic.   Eyes: Conjunctivae are normal.   Neck: Normal range of motion.   Cardiovascular: Normal rate, regular rhythm and intact distal pulses.   Pulmonary/Chest: Effort normal and breath sounds normal. No respiratory distress.   Abdominal: Soft. He exhibits no distension. There is no tenderness.   Musculoskeletal: He exhibits no edema.   Neurological:   AxOx3  CN II-XII intact  EOMI, PERRLA  5/5 RUE  5/5 LUE  5/5 RLE  5/5 LLE  Sensation to light touch intact in all extremities  Romberg negative  Gait normal     Skin: Skin is warm and dry. Capillary refill takes less than 2 seconds.   Psychiatric: He has a normal mood and affect.   Nursing note and vitals reviewed.      Procedures           ED Course        CT Head Without Contrast    (Results Pending)   CT venogram head w contrast    (Results Pending)     Labs Reviewed   COMPREHENSIVE METABOLIC PANEL - Abnormal; Notable for the following components:       Result Value    Glucose 102 (*)     Sodium 132 (*)     Chloride 91 (*)     CO2 33.0 (*)     All other components within normal limits    Narrative:     GFR Normal >60  Chronic Kidney Disease <60  Kidney Failure <15   CBC WITH AUTO DIFFERENTIAL - Abnormal; Notable for the following components:    RBC 3.91 (*)     Hemoglobin 12.4 (*)     Hematocrit 35.4 (*)     All other components within normal limits   MANUAL DIFFERENTIAL - Abnormal; Notable for the following components:    Monocyte % 1.0 (*)     Atypical Lymphocyte % 6.0 (*)     Monocytes Absolute 0.06 (*)     All other components within normal limits   CARBAMAZEPINE LEVEL, TOTAL - Normal   RAINBOW DRAW    Narrative:     The following orders were created for panel order [place] Draw.  Procedure                               Abnormality         Status                     ---------                               -----------         ------                     Light Blue Top[[id number]]                                    Final result               Green Top (Gel)[[id number]]                                  Final result               Lavender Top[[id number]]                                     Final result               Red Top[[id number]]                                          Final result                 Please view results for these tests on the individual orders.   CBC WITH AUTO DIFFERENTIAL   CBC AND DIFFERENTIAL    Narrative:     The following orders were created for panel order CBC & Differential.  Procedure                               Abnormality         Status                     ---------                               -----------         ------                     CBC Auto Differential[[id number]]        Abnormal            Final result                 Please view results for these tests on the individual orders.   LIGHT BLUE TOP   GREEN TOP   LAVENDER TOP   RED TOP   CBC AND DIFFERENTIAL    Narrative:     The following orders were created for panel order CBC & Differential.  Procedure                               Abnormality         Status                     ---------                               -----------         ------                     Manual Differential[[id number]]                                                         CBC Auto Differential[[id number]]                                                         Please view results for these tests on the individual orders.   MANUAL DIFFERENTIAL         Endorsed to me: MRV is concerning for neoplasm or abscess. I spoke with Dr. Crawley who reccomends admission to his service and 1000 mg of IV depakon for his headache.               MDM  Number of Diagnoses or Management Options  Diagnosis management comments: 43-year-old male presenting with 5 days of intractable holocephalic headache.  Patient does have a history of brain tumor in the past.  Work-up will include CT of the head.  Also on the differential is a dural sinus thrombosis given  recent facial infection.  This will be evaluated with a CT venogram.    Pending at time of turnover to Dr. Spivey.        Final diagnoses:   Nonintractable headache, unspecified chronicity pattern, unspecified headache type   Intracranial mass            Florencio Spivey MD  05/10/19 0156

## 2019-05-10 NOTE — PLAN OF CARE
Problem: Patient Care Overview  Goal: Plan of Care Review  Outcome: Ongoing (interventions implemented as appropriate)   05/10/19 0547   Coping/Psychosocial   Plan of Care Reviewed With patient;significant other   Plan of Care Review   Progress no change   OTHER   Outcome Summary pt c/o severe headache, Dr. Crawley notified, PRN Tylenol orders recieved and given. Pt alert and oriented x4, some slight swelling noted around left eye, VSS, safety maintained will continue to monitor.       Problem: Pain, Acute (Adult)  Goal: Identify Related Risk Factors and Signs and Symptoms  Outcome: Outcome(s) achieved Date Met: 05/10/19    Goal: Acceptable Pain Control/Comfort Level  Outcome: Ongoing (interventions implemented as appropriate)

## 2019-05-10 NOTE — CONSULTS
NEUROSURGERY INITIAL HOSPITAL ENCOUNTER    Assessment/Plan:   Chirag Mata is a 43 y.o. male with a significant comorbidity bifrontal craniotomy resection in 2015 and seizures.  He presents with a new problem of intractable headache. Physical exam findings of neurologically intact.  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    Differential Diagnosis:   Ethmoid sinus mass: Mucocele, abscess, versus less likely recurrent tumor    Recommendations:  I will confer with Dr. Vu of ENT.  My general suspicion is that this is either a mucocele or abscess.  Given the erosion of the anterior cranial fossa he will require combined intracranial and transnasal endoscopic approach for reconstruction of the floor base and evacuation of abscess.  In the meantime we will need to obtain his outside records.  Evaluation of the location and postoperative status of the anterior fossa will be essential to determine what level of erosion is occurred since the surgery.  Additionally I would like to see the operative notes from the outside hospital to determine whether a periosteal flap might still exist that could be used for reconstruction of the floor.    In the meantime I would like to obtain a CRP and place him on broad-spectrum antibiotics.  After several days of IV antibiotics I think we will be able to discharge him home with suppressive antibiotics.  I recommend Decadron and Toradol for immediate relief of headaches and would place him on a Decadron taper.    After obtaining all the information from the outside hospital Dr. Vu and I will plan for a joint surgery sometime after he returns from vacation.  Should the patient develop worsening symptoms, seizures, strokelike symptoms, or other neurological decline then we will accelerate schedule.    I discussed the patients findings and my recommendations with patient, family, nursing staff and primary care  team    Thank you very much for this interesting consult.     Level of Risk: High due to:  severe exacerbation of chronic illness and illness with threat to life or bodily function  MDM: High Complexity  Mod = 17941, High=99223  ___________________________________________________________________    Reason for consult ethmoid sinus mass    Chief Complaint:   Chief Complaint   Patient presents with   • Headache       HPI: Chirag Mata is a 43 y.o. male with a significant comorbidity bifrontal craniotomy for meningioma in 2015.  He believes that this was done in [facility] although he cannot remember the surgeon.  This was done while he was in [place].  He originally had a poor reaction to Keppra and subsequently was taken off of antiepileptics.  He relocated to [place] and establish care with Dr. Crawley in October 2018 after having a cluster of absence and several grand mal seizures.  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  He was later transitioned to Tegretol.  Since this time he has been suffering from chronic daily headaches and has been managed with Elavil and Tegretol.    Approximately 2 weeks ago the patient was seen at [facility].  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  He was seen by ENT who lanced and debrided this and then placed him on 10 days of Keflex.    On 5/9/2019 the patient's significant other stated that he had been suffering from intractable headaches for 6 days.  These were holocephalic headaches and felt like a vice.  This was a 10 out of 10 in severity.  He has had no evidence of CSF leak or rhinorrhea.  He does have a nasal voice.  He has not had any new seizures.  He has no other signs of infection has not been spiking a fever.  MRI was obtained which showed a more  convincing mass in the ethmoid sinus with erosion of the anterior fossa.  Neurosurgery was consulted for further evaluation.  He also complains of left ear pain.    Review of Systems   Constitutional: Negative.    Eyes: Negative.    Respiratory: Negative.    Cardiovascular: Negative.    Gastrointestinal: Negative.    Endocrine: Negative.    Genitourinary: Negative.    Musculoskeletal: Negative.    Skin: Negative.    Allergic/Immunologic: Negative.    Neurological: Positive for headaches.   Hematological: Negative.    Psychiatric/Behavioral: Negative.         Past Medical History:  has a past medical history of Drug abuse (CMS/HCC) and Hypertension.    Past Surgical History:  has a past surgical history that includes Testicle Torsion Repair.    Family History: family history is not on file.    Social History:  reports that he has been smoking.  He has been smoking about 1.00 pack per day. He does not have any smokeless tobacco history on file. He reports that he drinks alcohol. He reports that he does not use drugs.    Allergies: Bactrim [sulfamethoxazole-trimethoprim]    Home Medications:   Current Facility-Administered Medications:   •  acetaminophen (TYLENOL) tablet 650 mg, 650 mg, Oral, Q4H PRN, Moise Crawley MD, 650 mg at 05/10/19 0920  •  amitriptyline (ELAVIL) tablet 50 mg, 50 mg, Oral, Nightly, Moise Crawley MD  •  carBAMazepine (TEGretol) tablet 400 mg, 400 mg, Oral, BID, Moise Crawley MD, 400 mg at 05/10/19 0920  •  hydrALAZINE (APRESOLINE) tablet 10 mg, 10 mg, Oral, Daily, Moise Crawley MD, 10 mg at 05/10/19 0920  •  hydrochlorothiazide (HYDRODIURIL) tablet 25 mg, 25 mg, Oral, Daily, Moise Crawley MD, 25 mg at 05/10/19 0921  •  lactated ringers infusion, 100 mL/hr, Intravenous, Continuous, Florencio Spivey MD, Last Rate: 100 mL/hr at 05/10/19 0523, 100 mL/hr at 05/10/19 0523  •  meperidine (DEMEROL) injection 25 mg, 25 mg, Intravenous, Once, Florencio Spivey MD  •   ondansetron (ZOFRAN) injection 4 mg, 4 mg, Intravenous, Q6H PRN, Moise Crawley MD  •  prochlorperazine (COMPAZINE) injection 5 mg, 5 mg, Intravenous, Q6H PRN, Moise Crawley MD, 5 mg at 05/10/19 1330  •  sodium chloride 0.9 % flush 10 mL, 10 mL, Intravenous, PRN, Abiel Hills MD  •  sodium chloride 0.9 % flush 3 mL, 3 mL, Intravenous, Q12H, Moise Crawley MD, 3 mL at 05/10/19 0921  •  sodium chloride 0.9 % flush 3-10 mL, 3-10 mL, Intravenous, PRN, Moise Crawley MD  •  traMADol (ULTRAM) tablet 50 mg, 50 mg, Oral, Q6H PRN, Moise Crawley MD    Medications: Scheduled Meds:  amitriptyline 50 mg Oral Nightly   carBAMazepine 400 mg Oral BID   hydrALAZINE 10 mg Oral Daily   hydrochlorothiazide 25 mg Oral Daily   meperidine 25 mg Intravenous Once   sodium chloride 3 mL Intravenous Q12H     Continuous Infusions:  lactated ringers 100 mL/hr Last Rate: 100 mL/hr (05/10/19 0523)     PRN Meds:.•  acetaminophen  •  ondansetron  •  prochlorperazine  •  sodium chloride  •  sodium chloride  •  traMADol    Vital Signs  Temp:  [98.7 °F (37.1 °C)-99 °F (37.2 °C)] 98.8 °F (37.1 °C)  Heart Rate:  [] 96  Resp:  [14-17] 16  BP: (130-145)/(72-92) 130/83    Physical Exam  Physical Exam   Constitutional: He is oriented to person, place, and time. He appears well-developed and well-nourished.   HENT:   Head: Normocephalic and atraumatic.       Eyes: Conjunctivae and EOM are normal. Pupils are equal, round, and reactive to light.   Fundoscopic exam:       The right eye shows no exudate, no hemorrhage and no papilledema.        The left eye shows no exudate, no hemorrhage and no papilledema.   Neck: Normal range of motion. Neck supple.   Cardiovascular:   Pulses:       Dorsalis pedis pulses are 2+ on the right side, and 2+ on the left side.        Posterior tibial pulses are 2+ on the right side, and 2+ on the left side.   Pulmonary/Chest: Effort normal. No respiratory distress.     Vascular Status -  His  right foot exhibits no edema. His left foot exhibits no edema.  Neurological: He is oriented to person, place, and time. He has a normal Finger-Nose-Finger Test, a normal Heel to Ravi Test and a normal Tandem Gait Test. Gait normal.   Skin: Skin is warm. No rash noted. No erythema.   Psychiatric: His speech is normal.       Neurologic Exam     Mental Status   Oriented to person, place, and time.   Registration: recalls 3 of 3 objects. Recall at 5 minutes: recalls 3 of 3 objects.   Attention: normal. Concentration: normal.   Speech: speech is normal   Knowledge: consistent with education.     Cranial Nerves     CN II   Visual acuity: normal    CN III, IV, VI   Pupils are equal, round, and reactive to light.  Extraocular motions are normal.   Diplopia: none    CN V   Facial sensation intact.   Right corneal reflex: normal  Left corneal reflex: normal    CN VII   Right facial weakness: none  Left facial weakness: none    CN VIII   Hearing: intact    CN IX, X   Palate: symmetric  Right gag reflex: normal  Left gag reflex: normal    CN XI   Right trapezius strength: normal  Left trapezius strength: normal    CN XII   Tongue deviation: none    Motor Exam   Right arm tone: normal  Left arm tone: normal  Right arm pronator drift: absent  Left arm pronator drift: absent  Right leg tone: normal  Left leg tone: normal    Strength   Strength 5/5 except as noted.     Sensory Exam   Light touch normal.   Proprioception normal.     Gait, Coordination, and Reflexes     Gait  Gait: normal    Coordination   Finger to nose coordination: normal  Heel to shin coordination: normal  Tandem walking coordination: normal    Reflexes   Reflexes 2+ except as noted.   Right plantar: normal  Left plantar: normal  Right Tena: absent  Left Tena: absent      Results Review:   Independent review and interpretation of imaging  Imaging Results (last 24 hours)     Procedure Component Value Units Date/Time    SCANNED - IMAGING [[id number]]  Resulted:  05/09/19      Updated:  05/10/19 0847    CT Head Without Contrast [[id number]] Collected:  05/10/19 0735     Updated:  05/10/19 0846    Narrative:       EXAMINATION:  CT HEAD WO CONTRAST-  5/9/2019 10:50 PM CDT     HISTORY: Prior history of frontal meningioma removal in 2015. New severe  headache. Evaluate for recurrence.     TECHNIQUE: Multiple axial images were obtained through the brain without  contrast infusion. Multiplanar images were reconstructed.     DLP: 822 mGy-cm. Automated dosage control was utilized.     COMPARISON: No comparison study.     FINDINGS: There is abnormal mass effect along the floor of the frontal  cranial fossa that extends through the floor of the frontal sinus and  into the sphenoid and ethmoid sinus region. This measures about 2.8 cm  transversely 3 cm in AP dimension and about 2.5 cm cephalocaudal. There  is pre-existing encephalomalacia/gliosis in the frontal lobes  bilaterally anteriorly and inferiorly. The ventricular system is  nondilated. There is no hemorrhage. The maxillary sinuses are clear. The  frontal sinuses are clear. The mastoid air cells are clear.       Impression:       1. Mass in the floor of the anterior cranial fossa extending through the  floor of the frontal cranial fossa and into the sphenoid/ethmoid sinus  region. This is predominantly soft tissue density and may represent  recurrent tumor. Infection is also in the differential. The patient's  previous diagnosis is meningioma.  2. Pre-existing encephalomalacia/gliosis in the anterior and inferior  frontal lobes.     This report was finalized on 05/10/2019 08:43 by Dr. Josef Hung MD.    CT venogram head w contrast [[id number]] Collected:  05/10/19 0743     Updated:  05/10/19 0845    Narrative:       EXAMINATION:  CT VENOGRAM HEAD WITH CONTRAST-  5/9/2019 10:50 PM CDT     HISTORY: Dural venous sinus thrombosis suspected. Recurrent mass.  Abnormal CT.     COMPARISON: 10/15/2018.     TECHNIQUE: Spiral  CT angiography/mammography was performed of the brain.  Dual phases were obtained. Sagittal, coronal and 3-D images were  reconstructed. DLP: 1673 mGy-cm.     FINDINGS: There is a mass that appears to be arising in the floor of the  anterior cranial fossa that is eroding the bone of the floor of the  frontal cranial fossa and extending into the sphenoid and ethmoid sinus  region. There is enhancement of the mass with scattered areas of low  density within the mass. The patient reportedly had a previous  meningioma. The mass measures about 3 cm transversely, 2.9 cm in  cephalocaudal dimension and about 3.2 cm and AP dimension. There is  encephalomalacia and gliosis in the anterior and inferior frontal lobes  bilaterally that is present on previous studies.     The vertebrobasilar arteries are intact. The internal carotid arteries  are intact. The anterior, middle and posterior cerebral arteries are  intact. The superior sagittal sinus is patent but somewhat small in  caliber. The transverse sinuses are patent but also small in caliber.  The sigmoid sinuses are patent bilaterally. The right side is dominant.  There is a partially empty appearance of the sella turcica.       Impression:       1. Suggestion of recurrent mass along the floor of the frontal cranial  fossa extending through the floor and into the sphenoid and ethmoid  sinus region. There is enhancement of the mass with scattered areas of  low density. Infection is in the differential, including fungal  infection. Given prior history of meningioma, this may represent  recurrent meningioma, although the appearance is somewhat atypical.  Meningioma typically enhances homogeneously. Other tumors cannot be  excluded.  2. No major arterial branch occlusions. The venous sinuses are patent  but somewhat small in caliber. There is also a partially empty  appearance of the sella turcica.     The findings were discussed with Dr. Crawley.  This report was finalized on  05/10/2019 08:42 by Dr. Josef Hung MD.        MRI brain:  EXAMINATION:  MRI BRAIN W WO CONTRAST-  5/10/2019 2:46 PM CDT     HISTORY: Headache, acute, norm neuro exam; R51-Headache;  R90.0-Intracranial space-occupying lesion found on diagnostic imaging of  central nervous system      COMPARISON: 10/15/2018 brain MR and 05/09/2019 head CT     TECHNIQUE: MR evaluation the brain was performed. Contrast enhanced  imaging obtained.     FINDINGS:     Changes from frontal craniotomy identified for planum sphenoidale  meningioma resection.     There is a large hypercellular enhancing mass filling the sphenoid sinus  extending anteriorly and superiorly to involve the ethmoid sinuses. This  mass does extend through the floor of the anterior cranial fossa abuts  the inferior frontal horns. Differential considerations include  recurrent hypercellular meningioma. Lymphoma is also considered.     Infection is felt to be most less likely based on the lack of T2/FLAIR  signal changes in the brain that appear stable compared to the previous  MR. Also the enhancement along the undersurface of the frontal horns  also appear similar.     Incidentally noted is a low-lying mildly beaking cerebellar tonsils more  apparent compared to the previous study. Significance uncertain, is  increased intracranial pressure suggested.     There is no diffusion signal abnormality indicating acute ischemic  event/area of infarction.     There are encephalomalacic changes involving both frontal horns previous  surgery.     There is no hydrocephalus. There is no midline shift.     IMPRESSION:  1. Large hypercellular mass in the sphenoid and ethmoid sinuses  extending superiorly through to the floor of the anterior cranial fossa.  Imaging characteristics suggest a hypercellular neoplasm, recurrent  meningioma considered.  2. Postsurgical changes frontal lobes.  3. Cerebellar tonsillar ectopia slightly more conspicuous compared to  the previous study.  These findings suggest the possibility of increased  intracranial pressure.  This report was finalized on 05/10/2019 16:17 by Dr. Nabil Blanchard MD.        MRI spine:   CT Head:  CT c-spine:  CT t-spine:  CT l-spine:  X-ray:    I reviewed the patient's new clinical results.  Lab Results (last 24 hours)     Procedure Component Value Units Date/Time    CBC & Differential [[id number]] Collected:  05/10/19 0837    Specimen:  Blood Updated:  05/10/19 0932    Narrative:       The following orders were created for panel order CBC & Differential.  Procedure                               Abnormality         Status                     ---------                               -----------         ------                     CBC Auto Differential[[id number]]        Abnormal            Final result                 Please view results for these tests on the individual orders.    CBC Auto Differential [[id number]]  (Abnormal) Collected:  05/10/19 0837    Specimen:  Blood Updated:  05/10/19 0932     WBC 4.48 10*3/mm3      RBC 4.00 10*6/mm3      Hemoglobin 12.7 g/dL      Hematocrit 36.1 %      MCV 90.3 fL      MCH 31.8 pg      MCHC 35.2 g/dL      RDW 13.0 %      RDW-SD 42.8 fl      MPV 8.8 fL      Platelets 253 10*3/mm3     Narrative:       The previously reported component NRBC is no longer being reported.    Manual Differential [[id number]]  (Abnormal) Collected:  05/10/19 0837    Specimen:  Blood Updated:  05/10/19 0932     Neutrophil % 76.3 %      Lymphocyte % 14.4 %      Monocyte % 7.2 %      Atypical Lymphocyte % 2.1 %      Neutrophils Absolute 3.42 10*3/mm3      Lymphocytes Absolute 0.65 10*3/mm3      Monocytes Absolute 0.32 10*3/mm3      RBC Morphology Normal     WBC Morphology Normal     Platelet Morphology Normal    Basic Metabolic Panel [[id number]]  (Abnormal) Collected:  05/10/19 0837    Specimen:  Blood Updated:  05/10/19 0911     Glucose 104 mg/dL      BUN 10 mg/dL      Creatinine 0.98 mg/dL      Sodium 134 mmol/L       Potassium 3.9 mmol/L      Chloride 95 mmol/L      CO2 30.0 mmol/L      Calcium 9.4 mg/dL      eGFR  African Amer 101 mL/min/1.73      BUN/Creatinine Ratio 10.2     Anion Gap 9.0 mmol/L     Narrative:       GFR Normal >60  Chronic Kidney Disease <60  Kidney Failure <15    Blood Culture - Blood, Hand, Right [[id number]] Collected:  05/10/19 0844    Specimen:  Blood from Hand, Right Updated:  05/10/19 0858    Blood Culture - Blood, Arm, Right [[id number]] Collected:  05/10/19 0837    Specimen:  Blood from Arm, Right Updated:  05/10/19 0858    CBC & Differential [[id number]] Collected:  05/10/19 0454    Specimen:  Blood Updated:  05/10/19 0618    Narrative:       The following orders were created for panel order CBC & Differential.  Procedure                               Abnormality         Status                     ---------                               -----------         ------                     Manual Differential[[id number]]                                                         CBC Auto Differential[[id number]]        Abnormal            Final result                 Please view results for these tests on the individual orders.    CBC Auto Differential [[id number]]  (Abnormal) Collected:  05/10/19 0454    Specimen:  Blood Updated:  05/10/19 0618     WBC 5.42 10*3/mm3      RBC 3.81 10*6/mm3      Hemoglobin 12.0 g/dL      Hematocrit 34.7 %      MCV 91.1 fL      MCH 31.5 pg      MCHC 34.6 g/dL      RDW 13.2 %      RDW-SD 43.3 fl      MPV 9.2 fL      Platelets 256 10*3/mm3     Manual Differential [[id number]]  (Normal) Collected:  05/10/19 0454    Specimen:  Blood Updated:  05/10/19 0618     Neutrophil % 58.0 %      Lymphocyte % 32.0 %      Monocyte % 5.0 %      Eosinophil % 3.0 %      Atypical Lymphocyte % 2.0 %      Neutrophils Absolute 3.14 10*3/mm3      Lymphocytes Absolute 1.73 10*3/mm3      Monocytes Absolute 0.27 10*3/mm3      Eosinophils Absolute 0.16 10*3/mm3      RBC Morphology Normal     WBC Morphology Normal      Platelet Morphology Normal    Carbamazepine Level, Total [[id number]]  (Normal) Collected:  05/09/19 2205    Specimen:  Blood Updated:  05/10/19 0003     Carbamazepine Level 9.1 mcg/mL     [place] Draw [[id number]] Collected:  05/09/19 2205    Specimen:  Blood Updated:  05/09/19 2315    Narrative:       The following orders were created for panel order [place] Draw.  Procedure                               Abnormality         Status                     ---------                               -----------         ------                     Light Blue Top[[id number]]                                   Final result               Green Top (Gel)[[id number]]                                  Final result               Lavender Top[[id number]]                                     Final result               Red Top[[id number]]                                          Final result                 Please view results for these tests on the individual orders.    Light Blue Top [[id number]] Collected:  05/09/19 2205    Specimen:  Blood Updated:  05/09/19 2315     Extra Tube hold for add-on     Comment: Auto resulted       Green Top (Gel) [[id number]] Collected:  05/09/19 2205    Specimen:  Blood Updated:  05/09/19 2315     Extra Tube Hold for add-ons.     Comment: Auto resulted.       Lavender Top [[id number]] Collected:  05/09/19 2205    Specimen:  Blood Updated:  05/09/19 2315     Extra Tube hold for add-on     Comment: Auto resulted       Red Top [[id number]] Collected:  05/09/19 2205    Specimen:  Blood Updated:  05/09/19 2315     Extra Tube Hold for add-ons.     Comment: Auto resulted.       Manual Differential [[id number]]  (Abnormal) Collected:  05/09/19 2205    Specimen:  Blood Updated:  05/09/19 2239     Neutrophil % 71.0 %      Lymphocyte % 22.0 %      Monocyte % 1.0 %      Atypical Lymphocyte % 6.0 %      Neutrophils Absolute 4.11 10*3/mm3      Lymphocytes Absolute 1.27 10*3/mm3      Monocytes Absolute 0.06 10*3/mm3       Microcytes Slight/1+     Vacuolated Neutrophils Slight/1+     Platelet Morphology Normal    CBC & Differential [[id number]] Collected:  05/09/19 2205    Specimen:  Blood Updated:  05/09/19 2239    Narrative:       The following orders were created for panel order CBC & Differential.  Procedure                               Abnormality         Status                     ---------                               -----------         ------                     CBC Auto Differential[[id number]]        Abnormal            Final result                 Please view results for these tests on the individual orders.    CBC Auto Differential [[id number]]  (Abnormal) Collected:  05/09/19 2205    Specimen:  Blood Updated:  05/09/19 2239     WBC 5.79 10*3/mm3      RBC 3.91 10*6/mm3      Hemoglobin 12.4 g/dL      Hematocrit 35.4 %      MCV 90.5 fL      MCH 31.7 pg      MCHC 35.0 g/dL      RDW 13.1 %      RDW-SD 43.4 fl      MPV 8.8 fL      Platelets 244 10*3/mm3     Comprehensive Metabolic Panel [[id number]]  (Abnormal) Collected:  05/09/19 2205    Specimen:  Blood Updated:  05/09/19 2227     Glucose 102 mg/dL      BUN 11 mg/dL      Creatinine 1.01 mg/dL      Sodium 132 mmol/L      Potassium 3.8 mmol/L      Chloride 91 mmol/L      CO2 33.0 mmol/L      Calcium 9.3 mg/dL      Total Protein 7.4 g/dL      Albumin 4.10 g/dL      ALT (SGPT) 22 U/L      AST (SGOT) 27 U/L      Alkaline Phosphatase 64 U/L      Total Bilirubin 0.3 mg/dL      eGFR  African Amer 98 mL/min/1.73      Globulin 3.3 gm/dL      A/G Ratio 1.2 g/dL      BUN/Creatinine Ratio 10.9     Anion Gap 8.0 mmol/L     Narrative:       GFR Normal >60  Chronic Kidney Disease <60  Kidney Failure <15          Jose Enrique Anguiano MD

## 2019-05-11 LAB
ANION GAP SERPL CALCULATED.3IONS-SCNC: 8 MMOL/L (ref 4–13)
BASOPHILS # BLD AUTO: 0.01 10*3/MM3 (ref 0–0.2)
BASOPHILS NFR BLD AUTO: 0.2 % (ref 0–2)
BUN BLD-MCNC: 13 MG/DL (ref 5–21)
BUN/CREAT SERPL: 11.6 (ref 7–25)
CALCIUM SPEC-SCNC: 9.3 MG/DL (ref 8.4–10.4)
CHLORIDE SERPL-SCNC: 95 MMOL/L (ref 98–110)
CO2 SERPL-SCNC: 31 MMOL/L (ref 24–31)
CREAT BLD-MCNC: 1.12 MG/DL (ref 0.5–1.4)
CRP SERPL-MCNC: 13.53 MG/DL (ref 0–0.99)
DEPRECATED RDW RBC AUTO: 43.8 FL (ref 40–54)
EOSINOPHIL # BLD AUTO: 0.02 10*3/MM3 (ref 0–0.7)
EOSINOPHIL NFR BLD AUTO: 0.4 % (ref 0–4)
ERYTHROCYTE [DISTWIDTH] IN BLOOD BY AUTOMATED COUNT: 12.7 % (ref 12–15)
GFR SERPL CREATININE-BSD FRML MDRD: 87 ML/MIN/1.73
GLUCOSE BLD-MCNC: 95 MG/DL (ref 70–100)
GLUCOSE BLDC GLUCOMTR-MCNC: 114 MG/DL (ref 70–130)
GLUCOSE BLDC GLUCOMTR-MCNC: 150 MG/DL (ref 70–130)
GLUCOSE BLDC GLUCOMTR-MCNC: 157 MG/DL (ref 70–130)
HCT VFR BLD AUTO: 37.4 % (ref 40–52)
HGB BLD-MCNC: 12.8 G/DL (ref 14–18)
IMM GRANULOCYTES # BLD AUTO: 0.01 10*3/MM3 (ref 0–0.05)
IMM GRANULOCYTES NFR BLD AUTO: 0.2 % (ref 0–5)
LYMPHOCYTES # BLD AUTO: 1.32 10*3/MM3 (ref 0.72–4.86)
LYMPHOCYTES NFR BLD AUTO: 24.2 % (ref 15–45)
MCH RBC QN AUTO: 31.6 PG (ref 28–32)
MCHC RBC AUTO-ENTMCNC: 34.2 G/DL (ref 33–36)
MCV RBC AUTO: 92.3 FL (ref 82–95)
MONOCYTES # BLD AUTO: 0.48 10*3/MM3 (ref 0.19–1.3)
MONOCYTES NFR BLD AUTO: 8.8 % (ref 4–12)
NEUTROPHILS # BLD AUTO: 3.62 10*3/MM3 (ref 1.87–8.4)
NEUTROPHILS NFR BLD AUTO: 66.2 % (ref 39–78)
NRBC BLD AUTO-RTO: 0 /100 WBC (ref 0–0.2)
PLATELET # BLD AUTO: 270 10*3/MM3 (ref 130–400)
PMV BLD AUTO: 8.8 FL (ref 6–12)
POTASSIUM BLD-SCNC: 4.7 MMOL/L (ref 3.5–5.3)
RBC # BLD AUTO: 4.05 10*6/MM3 (ref 4.8–5.9)
SODIUM BLD-SCNC: 134 MMOL/L (ref 135–145)
WBC NRBC COR # BLD: 5.46 10*3/MM3 (ref 4.8–10.8)

## 2019-05-11 PROCEDURE — 99233 SBSQ HOSP IP/OBS HIGH 50: CPT | Performed by: PSYCHIATRY & NEUROLOGY

## 2019-05-11 PROCEDURE — 25010000002 CEFEPIME PER 500 MG: Performed by: NEUROLOGICAL SURGERY

## 2019-05-11 PROCEDURE — 25010000002 VANCOMYCIN 1 G RECONSTITUTED SOLUTION 1 EACH VIAL: Performed by: NEUROLOGICAL SURGERY

## 2019-05-11 PROCEDURE — 63710000001 DEXAMETHASONE PER 0.25 MG: Performed by: NEUROLOGICAL SURGERY

## 2019-05-11 PROCEDURE — 80048 BASIC METABOLIC PNL TOTAL CA: CPT | Performed by: PSYCHIATRY & NEUROLOGY

## 2019-05-11 PROCEDURE — 25010000002 KETOROLAC TROMETHAMINE PER 15 MG: Performed by: NEUROLOGICAL SURGERY

## 2019-05-11 PROCEDURE — 82962 GLUCOSE BLOOD TEST: CPT

## 2019-05-11 PROCEDURE — 86140 C-REACTIVE PROTEIN: CPT | Performed by: NEUROLOGICAL SURGERY

## 2019-05-11 PROCEDURE — 85025 COMPLETE CBC W/AUTO DIFF WBC: CPT | Performed by: PSYCHIATRY & NEUROLOGY

## 2019-05-11 PROCEDURE — G0378 HOSPITAL OBSERVATION PER HR: HCPCS

## 2019-05-11 PROCEDURE — 25010000002 PROCHLORPERAZINE 10 MG/2ML SOLUTION: Performed by: PSYCHIATRY & NEUROLOGY

## 2019-05-11 RX ORDER — VANCOMYCIN HYDROCHLORIDE 1 G/200ML
1000 INJECTION, SOLUTION INTRAVENOUS EVERY 8 HOURS
Status: DISCONTINUED | OUTPATIENT
Start: 2019-05-11 | End: 2019-05-11

## 2019-05-11 RX ORDER — NICOTINE POLACRILEX 4 MG
15 LOZENGE BUCCAL
Status: DISCONTINUED | OUTPATIENT
Start: 2019-05-11 | End: 2019-05-14 | Stop reason: HOSPADM

## 2019-05-11 RX ORDER — DEXTROSE MONOHYDRATE 25 G/50ML
25 INJECTION, SOLUTION INTRAVENOUS
Status: DISCONTINUED | OUTPATIENT
Start: 2019-05-11 | End: 2019-05-14 | Stop reason: HOSPADM

## 2019-05-11 RX ORDER — PANTOPRAZOLE SODIUM 40 MG/1
40 TABLET, DELAYED RELEASE ORAL
Status: DISCONTINUED | OUTPATIENT
Start: 2019-05-12 | End: 2019-05-14 | Stop reason: HOSPADM

## 2019-05-11 RX ADMIN — CARBAMAZEPINE 400 MG: 200 TABLET ORAL at 22:53

## 2019-05-11 RX ADMIN — PROCHLORPERAZINE EDISYLATE 5 MG: 5 INJECTION INTRAMUSCULAR; INTRAVENOUS at 19:49

## 2019-05-11 RX ADMIN — VANCOMYCIN HYDROCHLORIDE 1750 MG: 1 INJECTION, POWDER, LYOPHILIZED, FOR SOLUTION INTRAVENOUS at 14:14

## 2019-05-11 RX ADMIN — DEXAMETHASONE 4 MG: 4 TABLET ORAL at 05:30

## 2019-05-11 RX ADMIN — PROCHLORPERAZINE EDISYLATE 5 MG: 5 INJECTION INTRAMUSCULAR; INTRAVENOUS at 11:49

## 2019-05-11 RX ADMIN — KETOROLAC TROMETHAMINE 15 MG: 30 INJECTION, SOLUTION INTRAMUSCULAR at 19:49

## 2019-05-11 RX ADMIN — DEXAMETHASONE 4 MG: 4 TABLET ORAL at 13:10

## 2019-05-11 RX ADMIN — METRONIDAZOLE 500 MG: 500 INJECTION, SOLUTION INTRAVENOUS at 13:03

## 2019-05-11 RX ADMIN — PROCHLORPERAZINE EDISYLATE 5 MG: 5 INJECTION INTRAMUSCULAR; INTRAVENOUS at 05:31

## 2019-05-11 RX ADMIN — KETOROLAC TROMETHAMINE 15 MG: 30 INJECTION, SOLUTION INTRAMUSCULAR at 11:49

## 2019-05-11 RX ADMIN — AMITRIPTYLINE HYDROCHLORIDE 50 MG: 25 TABLET, FILM COATED ORAL at 22:53

## 2019-05-11 RX ADMIN — METRONIDAZOLE 500 MG: 500 INJECTION, SOLUTION INTRAVENOUS at 21:36

## 2019-05-11 RX ADMIN — KETOROLAC TROMETHAMINE 15 MG: 30 INJECTION, SOLUTION INTRAMUSCULAR at 05:30

## 2019-05-11 RX ADMIN — HYDRALAZINE HYDROCHLORIDE 10 MG: 10 TABLET, FILM COATED ORAL at 09:14

## 2019-05-11 RX ADMIN — CEFEPIME HYDROCHLORIDE 1 G: 1 INJECTION, POWDER, FOR SOLUTION INTRAMUSCULAR; INTRAVENOUS at 11:51

## 2019-05-11 RX ADMIN — HYDROCHLOROTHIAZIDE 25 MG: 25 TABLET ORAL at 09:14

## 2019-05-11 RX ADMIN — CEFEPIME HYDROCHLORIDE 1 G: 1 INJECTION, POWDER, FOR SOLUTION INTRAMUSCULAR; INTRAVENOUS at 23:00

## 2019-05-11 RX ADMIN — SODIUM CHLORIDE, PRESERVATIVE FREE 3 ML: 5 INJECTION INTRAVENOUS at 09:14

## 2019-05-11 RX ADMIN — DEXAMETHASONE 4 MG: 4 TABLET ORAL at 22:54

## 2019-05-11 RX ADMIN — SODIUM CHLORIDE, PRESERVATIVE FREE 10 ML: 5 INJECTION INTRAVENOUS at 11:51

## 2019-05-11 RX ADMIN — CARBAMAZEPINE 400 MG: 200 TABLET ORAL at 09:14

## 2019-05-11 RX ADMIN — DEXAMETHASONE 4 MG: 4 TABLET ORAL at 17:36

## 2019-05-11 NOTE — PLAN OF CARE
Problem: Patient Care Overview  Goal: Plan of Care Review  Outcome: Ongoing (interventions implemented as appropriate)   05/11/19 6126   Coping/Psychosocial   Plan of Care Reviewed With patient   Plan of Care Review   Progress no change   OTHER   Outcome Summary A&Ox4. Pain controlled with ordered meds. ABX transfused per orders. SO at bedside, very attentive. VSS. Safety maintained.       Problem: Pain, Acute (Adult)  Goal: Acceptable Pain Control/Comfort Level  Outcome: Ongoing (interventions implemented as appropriate)

## 2019-05-11 NOTE — PLAN OF CARE
Problem: Patient Care Overview  Goal: Plan of Care Review  Outcome: Ongoing (interventions implemented as appropriate)   05/11/19 0050   Coping/Psychosocial   Plan of Care Reviewed With patient;significant other   Plan of Care Review   Progress improving   OTHER   Outcome Summary Compazine given with no relief of HA, Toradol given for HA with relief, rested well, BP elevated, stat CT performed at beginning of shift.         Problem: Pain, Acute (Adult)  Goal: Acceptable Pain Control/Comfort Level  Outcome: Ongoing (interventions implemented as appropriate)

## 2019-05-11 NOTE — PROGRESS NOTES
Neurology Progress Note      Chief Complaint:  Headache    Subjective     Subjective:    Headache improved today after steroids given by neurosurgery overnight.  The patient is anxious to go home although understands the need for further work-up and treatment.  Both ENT and neurosurgery consulted on the patient overnight.  They have plans to discuss treatment plans further.  Neurosurgery began an antibiotic course with broad-spectrum antibiotics.  Medications:  Current Facility-Administered Medications   Medication Dose Route Frequency Provider Last Rate Last Dose   • acetaminophen (TYLENOL) tablet 650 mg  650 mg Oral Q4H PRN Moise Crawley MD   650 mg at 05/10/19 1552   • amitriptyline (ELAVIL) tablet 50 mg  50 mg Oral Nightly Moise Crawley MD   50 mg at 05/10/19 2111   • carBAMazepine (TEGretol) tablet 400 mg  400 mg Oral BID Moise Crawley MD   400 mg at 05/11/19 0914   • cefepime (MAXIPIME) 1 g/100 mL 0.9% NS IVPB (mbp)  1 g Intravenous Q8H Jose Enrique Anguiano  mL/hr at 05/11/19 1151 1 g at 05/11/19 1151   • dexamethasone (DECADRON) tablet 4 mg  4 mg Oral Q6H Jose Enrique Anguiano MD   4 mg at 05/11/19 0530   • hydrALAZINE (APRESOLINE) tablet 10 mg  10 mg Oral Daily Moise Crawley MD   10 mg at 05/11/19 0914   • hydrochlorothiazide (HYDRODIURIL) tablet 25 mg  25 mg Oral Daily Moise Crawley MD   25 mg at 05/11/19 0914   • ketorolac (TORADOL) injection 15 mg  15 mg Intravenous Q6H PRN Jose Enrique Anguiano MD   15 mg at 05/11/19 1149   • lactated ringers infusion  100 mL/hr Intravenous Continuous Florencio Spivey  mL/hr at 05/10/19 0523 100 mL/hr at 05/10/19 0523   • meperidine (DEMEROL) injection 25 mg  25 mg Intravenous Once Florencio Spivey MD       • metroNIDAZOLE (FLAGYL) IVPB 500 mg  500 mg Intravenous Q8H Jose Enrique Anguiano MD       • ondansetron (ZOFRAN) injection 4 mg  4 mg Intravenous Q6H PRN Moise Crawley MD       • prochlorperazine  (COMPAZINE) injection 5 mg  5 mg Intravenous Q6H PRN Moise Crawley MD   5 mg at 05/11/19 1149   • sodium chloride 0.9 % flush 10 mL  10 mL Intravenous PRN Abiel Hills MD       • sodium chloride 0.9 % flush 3 mL  3 mL Intravenous Q12H Moise Crawley MD   3 mL at 05/11/19 0914   • sodium chloride 0.9 % flush 3-10 mL  3-10 mL Intravenous PRN Moise Crawley MD   10 mL at 05/11/19 1151   • traMADol (ULTRAM) tablet 50 mg  50 mg Oral Q6H PRN Moise Crawley MD       • vancomycin (VANCOCIN) 1,750 mg in sodium chloride 0.9 % 500 mL IVPB  15 mg/kg Intravenous Q12H Jose Enrique Anguiano MD           Review of Systems:   -A 14 point review of systems is completed and is negative except for headache      Objective      Vital Signs  Temp:  [98.5 °F (36.9 °C)-99 °F (37.2 °C)] 98.5 °F (36.9 °C)  Heart Rate:  [85-96] 96  Resp:  [16-18] 18  BP: (131-151)/(87-99) 131/93    Physical Exam:    General Exam:  Head:  Normal cephalic, atraumatic  HEENT:  Neck supple  Fundoscopic Exam:  No signs of disc edema  CVS:  Regular rate and rhythm.  No murmurs  Carotid Examination:  No bruits  Lungs:  Clear to auscultation  Abdomen:  Non-tender, Non-distended  Extremities:  No signs of peripheral edema  Skin:  No rashes    Neurologic Exam:    Mental Status:    -Awake, Alert, Oriented X 3  -No word finding difficulties  -No aphasia  -No dysarthria  -Follows simple and complex commands    CN II:  Visual fields full.  Pupils equally reactive to light  CN III, IV, VI:  Extraocular Muscles full with no signs of nystagmus  CN V:  Facial sensory is symmetric with no asymmetries.  CN VII:  Facial motor symmetric  CN VIII:  Gross hearing intact bilaterally  CN IX:  Palate elevates symmetrically  CN X:  Palate elevates symmetrically  CN XI:  Shoulder shrug symmetric  CN XII:  Tongue is midline on protrusion    Motor: (strength out of 5:  1= minimal movement, 2 = movement in plane of gravity, 3 = movement against gravity, 4 =  movement against some resistance, 5 = full strength)    -Right Upper Ext: Proximal: 5 Distal: 5  -Left Upper Ext: Proximal: 5 Distal: 5    -Right Lower Ext: Proximal: 5 Distal: 5  -Left Lower Ext: Proximal: 5 Distal: 5    DTR:  -Right   Bicep: 2+ Tricep: 2+ Brachioradialis: 2+   Patella: 2+ Ankle: 2+ Neg Babinski  -Left   Bicep: 2+ Tricep: 2+ Brachioradialis: 2+   Patella: 2+ Ankle: 2+ Neg Babinski    Sensory:  -Intact to light touch, pinprick, temperature, pain, and proprioception    Coordination:  -Finger to nose intact  -Heel to shin intact  -No ataxia    Gait  -No signs of ataxia  -ambulates unassisted       Results Review:    I reviewed the patient's new clinical results.    Results from last 7 days   Lab Units 05/11/19  0526 05/10/19  0837 05/10/19  0454   WBC 10*3/mm3 5.46 4.48* 5.42   HEMOGLOBIN g/dL 12.8* 12.7* 12.0*   HEMATOCRIT % 37.4* 36.1* 34.7*   PLATELETS 10*3/mm3 270 253 256        Results from last 7 days   Lab Units 05/11/19  0526 05/10/19  0837 05/09/19  2205   SODIUM mmol/L 134* 134* 132*   POTASSIUM mmol/L 4.7 3.9 3.8   CHLORIDE mmol/L 95* 95* 91*   CO2 mmol/L 31.0 30.0 33.0*   BUN mg/dL 13 10 11   CREATININE mg/dL 1.12 0.98 1.01   CALCIUM mg/dL 9.3 9.4 9.3   BILIRUBIN mg/dL  --   --  0.3   ALK PHOS U/L  --   --  64   ALT (SGPT) U/L  --   --  22   AST (SGOT) U/L  --   --  27   GLUCOSE mg/dL 95 104* 102*        Lab Results   Component Value Date    MG 2.2 10/15/2018    PROTIME 13.0 10/15/2018    INR 0.95 10/15/2018     No components found for: POCGLUC  No components found for: A1C  No results found for: HDL, LDL  No components found for: B12  Lab Results   Component Value Date    TSH 3.230 10/15/2018     MRI brain with without contrast reviewed by me.  There is evidence of a large hypercellular mass in the sphenoid and ethmoid sinuses that extends up into the anterior cranial fossa.  Is very atypical.  The contrast-enhancement is not continuous.  This is a very atypical look for meningioma  although this cannot be ruled out.    CT of sinuses was performed overnight as well and results are as below:  Redemonstration of mass at the anterior cranial fossa which has bony  margins measuring about 3.4 x 3.1 x 2.2 cm (AP by transverse by  craniocaudal). The bony cortex is severely thinned and may be dehiscent  at the anterior inferior aspect such as sagittal series 10 image 49.   Assessment/Plan     Hospital Problem List      Nonintractable headache    Impression:  1.  Headache: Now improved after steroid administration.  We will continue to avoid narcotics.  2.  Frontal mass lesion with extension into the sinuses -I have discussed the case with neurosurgery and will await ENT recommendations as well.  It is my understanding that the differential diagnosis will remain an atypical meningioma with recurrence from the previous surgical site.  Perhaps even a bigger concern is an atypical abscess in the previous surgical bed.  This may or may not be associated with the previous facial infection with lancing and antibiotics that occurred several weeks ago.  Regardless it sounds as if the initial goal will be to provide the patient with broad-spectrum antibiotics while ENT and neurosurgery discussed the case in more detail and decide on the patient's candidacy for further surgical intervention.  3.  History of drug dependence  4.  S/P bilateral meningioma resection  5.  MARIA LUISA on CPAP at home    Plan:  · Decadron 4mg Q6H started on 5/10  · Cefepime 1gm Q8H started on 5/11  · Vancocin 1750mg Q12H started on 5/11  · CPAP stopped as not not push air into the region with the abcess  · Continue Tegretol for seizure prevention and headaches  · Tobacco cessation counseling  · Avoid mind altering medications in accordance with pain contract patient made with [facility]  · Will await further discussions of ENT and Neurosurgery        Moise Crawley MD  05/11/19  11:52 AM

## 2019-05-11 NOTE — PAYOR COMM NOTE
"ADMIT INPT 5-9-19  UR PHONE    [phone]    Chirag Mata (43 y.o. Male)     Date of Birth Social Security Number Address Home Phone MRN    1975  [address] [place] [id number] [phone] [id number]    Christianity Marital Status          Jew Single       Admission Date Admission Type Admitting Provider Attending Provider Department, Room/Bed    5/9/19 Emergency Moise Crawley MD [place], William Lee, MD [facility] 3A, 329/1    Discharge Date Discharge Disposition Discharge Destination                       Attending Provider:  Jose Enrique Anguiano MD    Allergies:  Bactrim [Sulfamethoxazole-trimethoprim]    Isolation:  None   Infection:  None   Code Status:  CPR    Ht:  188 cm (74\")   Wt:  117 kg (259 lb)    Admission Cmt:  None   Principal Problem:  None                Active Insurance as of 5/9/2019     Primary Coverage     Payor Plan Insurance Group Employer/Plan Group    [employer] [place] [employer] [place]      Payor Plan Address Payor Plan Phone Number Payor Plan Fax Number Effective Dates    [address] [address] [phone]  10/15/2018 - None Entered    [facility] [id number]       Subscriber Name Subscriber Birth Date Member ID       CHIRAG MATA 1975 [id number]                 Emergency Contacts      (Rel.) Home Phone Work Phone Mobile Phone    WaltonCarmen (Significant Other) -- -- [phone]               History & Physical      Moise Crawley MD at 5/10/2019 10:48 AM          Neurology History & Physical    Indication for Admission: headache    History of present illness: This is a 43-year-old -American male with a history of bilateral frontal meningioma resections in 2015.  He initially had a poor reaction to Keppra and subsequent to this was not taking antiepileptics.  I saw him in the fall 2018.  Specifically in October he presented having several generalized tonic-clonic seizures.  He was initially loaded with " Depakote after an MRI of the brain and EEG were unremarkable.  He was followed up in the neurology clinic subsequent to this and was transitioned to Tegretol therapy.  He has a chronic daily headache that is managed with Elavil and Tegretol.  He also is a recovering opiate addict and is currently attending [place] and is on restrictions for any mind altering medications and specifically noted is avoidance of all opiate-based medications.    I received a call last night from his girlfriend who stated that for 6 days he has been suffering a vice-like holocephalic headache.  On the 10th of last month he had a skin infection that sounded like a focal cellulitis over the right eye.  He was seen and had the lesion excised.  He received 10 days of Keflex.  Reportedly he was compliant with this.  Since that time he has had no fevers, chills, night sweats, nor neck stiffness.  He has had no light or sound sensitivity.  He denies nausea and vomiting.  Once again his main complaint now is that he is suffering from a worsening headache that is above his baseline.  He was seen in our emergency room last night and head CT shows evidence of a masslike lesion in the frontal area that may extend to the sinuses.  Differential diagnosis includes tumor recurrence versus infection.  He denies any fevers and he has no elevation of his white count.  He does not remember his last seizure but it was quite some time ago.  He denies any neurologic symptoms including diplopia, blurred vision, speech changes, facial drooping, unilateral weakness, unilateral numbness.    Past Medical History:   Diagnosis Date   • Drug abuse (CMS/[facility])    • Hypertension        Allergies   Allergen Reactions   • Bactrim [Sulfamethoxazole-Trimethoprim] Other (See Comments)     SHAKES VOMITTING       Medications Prior to Admission   Medication Sig Dispense Refill Last Dose   • amitriptyline (ELAVIL) 50 MG tablet Take 1 1/2 tablets at HS 45 tablet 5    •  carBAMazepine (TEGRETOL) 200 MG tablet Take 2 tablets by mouth 2 (Two) Times a Day. 120 tablet 2    • hydrALAZINE (APRESOLINE) 10 MG tablet Take 10 mg by mouth Daily.      • hydrochlorothiazide (HYDRODIURIL) 25 MG tablet Take 25 mg by mouth Daily.   Taking       Social History     Socioeconomic History   • Marital status: Single     Spouse name: Not on file   • Number of children: Not on file   • Years of education: Not on file   • Highest education level: Not on file   Tobacco Use   • Smoking status: Current Every Day Smoker     Packs/day: 1.00   Substance and Sexual Activity   • Alcohol use: Yes     Comment: ocassionally   • Drug use: No   • Sexual activity: Yes     Partners: Female     History reviewed. No pertinent family history.    Review of Systems  Review of Systems -a 14 point review of system was performed was positive only for headache as noted above.    Vital Signs   Temp:  [98.7 °F (37.1 °C)-99 °F (37.2 °C)] 99 °F (37.2 °C)  Heart Rate:  [] 105  Resp:  [14-17] 14  BP: (130-145)/(72-92) 130/72    General Exam:  Head:  Normal cephalic, atraumatic  HEENT:  Neck supple  Fundoscopic Exam:  No signs of disc edema  CVS:  Regular rate and rhythm.  No murmurs  Carotid Examination:  No bruits  Lungs:  Clear to auscultation  Abdomen:  Non-tender, Non-distended  Extremities:  No signs of peripheral edema  Skin:  No rashes    Neurologic Exam:    Mental Status:    -Awake, Alert, Oriented X 3  -No word finding difficulties  -No aphasia  -No dysarthria  -Follows simple and complex commands    CN II:  Visual fields full.  Pupils equally reactive to light  CN III, IV, VI:  Extraocular Muscles full with no signs of nystagmus  CN V:  Facial sensory is symmetric with no asymmetries.  CN VII:  Facial motor symmetric  CN VIII:  Gross hearing intact bilaterally  CN IX:  Palate elevates symmetrically  CN X:  Palate elevates symmetrically  CN XI:  Shoulder shrug symmetric  CN XII:  Tongue is midline on protrusion    Motor:  (strength out of 5:  1= minimal movement, 2 = movement in plane of gravity, 3 = movement against gravity, 4 = movement against some resistance, 5 = full strength)    -Right Upper Ext: Proximal: 5 Distal: 5  -Left Upper Ext: Proximal: 5 Distal: 5    -Right Lower Ext: Proximal: 5 Distal: 5  -Left Lower Ext: Proximal: 5 Distal: 5    DTR:  -Right   Bicep: 2+ Tricep: 2+ Brachoradialis: 2+   Patella: 2+ Ankle: 2+ Neg Babinski  -Left   Bicep: 2+ Tricep: 2+ Brachoradialis: 2+   Patella: 2+ Ankle: 2+ Neg Babinski    Sensory:  -Intact to light touch, pinprick, temperature, pain, and proprioception    Coordination:  -Finger to nose intact  -Heel to shin intact  -No ataxia    Gait  -No signs of ataxia  -ambulates unassisted      Results Review:  Lab Results (last 7 days)     Procedure Component Value Units Date/Time    CBC & Differential [[id number]] Collected:  05/10/19 0837    Specimen:  Blood Updated:  05/10/19 0932    Narrative:       The following orders were created for panel order CBC & Differential.  Procedure                               Abnormality         Status                     ---------                               -----------         ------                     CBC Auto Differential[[id number]]        Abnormal            Final result                 Please view results for these tests on the individual orders.    CBC Auto Differential [[id number]]  (Abnormal) Collected:  05/10/19 0837    Specimen:  Blood Updated:  05/10/19 0932     WBC 4.48 10*3/mm3      RBC 4.00 10*6/mm3      Hemoglobin 12.7 g/dL      Hematocrit 36.1 %      MCV 90.3 fL      MCH 31.8 pg      MCHC 35.2 g/dL      RDW 13.0 %      RDW-SD 42.8 fl      MPV 8.8 fL      Platelets 253 10*3/mm3     Narrative:       The previously reported component NRBC is no longer being reported.    Manual Differential [[id number]]  (Abnormal) Collected:  05/10/19 0837    Specimen:  Blood Updated:  05/10/19 0932     Neutrophil % 76.3 %      Lymphocyte % 14.4 %       Monocyte % 7.2 %      Atypical Lymphocyte % 2.1 %      Neutrophils Absolute 3.42 10*3/mm3      Lymphocytes Absolute 0.65 10*3/mm3      Monocytes Absolute 0.32 10*3/mm3      RBC Morphology Normal     WBC Morphology Normal     Platelet Morphology Normal    Basic Metabolic Panel [[id number]]  (Abnormal) Collected:  05/10/19 0837    Specimen:  Blood Updated:  05/10/19 0911     Glucose 104 mg/dL      BUN 10 mg/dL      Creatinine 0.98 mg/dL      Sodium 134 mmol/L      Potassium 3.9 mmol/L      Chloride 95 mmol/L      CO2 30.0 mmol/L      Calcium 9.4 mg/dL      eGFR  African Amer 101 mL/min/1.73      BUN/Creatinine Ratio 10.2     Anion Gap 9.0 mmol/L     Narrative:       GFR Normal >60  Chronic Kidney Disease <60  Kidney Failure <15    Blood Culture - Blood, Hand, Right [[id number]] Collected:  05/10/19 0844    Specimen:  Blood from Hand, Right Updated:  05/10/19 0858    Blood Culture - Blood, Arm, Right [[id number]] Collected:  05/10/19 0837    Specimen:  Blood from Arm, Right Updated:  05/10/19 0858    CBC & Differential [[id number]] Collected:  05/10/19 0454    Specimen:  Blood Updated:  05/10/19 0618    Narrative:       The following orders were created for panel order CBC & Differential.  Procedure                               Abnormality         Status                     ---------                               -----------         ------                     Manual Differential[[id number]]                                                         CBC Auto Differential[[id number]]        Abnormal            Final result                 Please view results for these tests on the individual orders.    CBC Auto Differential [[id number]]  (Abnormal) Collected:  05/10/19 0454    Specimen:  Blood Updated:  05/10/19 0618     WBC 5.42 10*3/mm3      RBC 3.81 10*6/mm3      Hemoglobin 12.0 g/dL      Hematocrit 34.7 %      MCV 91.1 fL      MCH 31.5 pg      MCHC 34.6 g/dL      RDW 13.2 %      RDW-SD 43.3 fl      MPV 9.2 fL      Platelets  256 10*3/mm3     Manual Differential [[id number]]  (Normal) Collected:  05/10/19 0454    Specimen:  Blood Updated:  05/10/19 0618     Neutrophil % 58.0 %      Lymphocyte % 32.0 %      Monocyte % 5.0 %      Eosinophil % 3.0 %      Atypical Lymphocyte % 2.0 %      Neutrophils Absolute 3.14 10*3/mm3      Lymphocytes Absolute 1.73 10*3/mm3      Monocytes Absolute 0.27 10*3/mm3      Eosinophils Absolute 0.16 10*3/mm3      RBC Morphology Normal     WBC Morphology Normal     Platelet Morphology Normal    Carbamazepine Level, Total [[id number]]  (Normal) Collected:  05/09/19 2205    Specimen:  Blood Updated:  05/10/19 0003     Carbamazepine Level 9.1 mcg/mL     [place] Draw [[id number]] Collected:  05/09/19 2205    Specimen:  Blood Updated:  05/09/19 2315    Narrative:       The following orders were created for panel order [place] Draw.  Procedure                               Abnormality         Status                     ---------                               -----------         ------                     Light Blue Top[[id number]]                                   Final result               Green Top (Gel)[[id number]]                                  Final result               Lavender Top[[id number]]                                     Final result               Red Top[[id number]]                                          Final result                 Please view results for these tests on the individual orders.    Light Blue Top [[id number]] Collected:  05/09/19 2205    Specimen:  Blood Updated:  05/09/19 2315     Extra Tube hold for add-on     Comment: Auto resulted       Green Top (Gel) [[id number]] Collected:  05/09/19 2205    Specimen:  Blood Updated:  05/09/19 2315     Extra Tube Hold for add-ons.     Comment: Auto resulted.       Lavender Top [[id number]] Collected:  05/09/19 2205    Specimen:  Blood Updated:  05/09/19 2315     Extra Tube hold for add-on     Comment: Auto resulted       Red Top [[id number]] Collected:   05/09/19 2205    Specimen:  Blood Updated:  05/09/19 2315     Extra Tube Hold for add-ons.     Comment: Auto resulted.       Manual Differential [[id number]]  (Abnormal) Collected:  05/09/19 2205    Specimen:  Blood Updated:  05/09/19 2239     Neutrophil % 71.0 %      Lymphocyte % 22.0 %      Monocyte % 1.0 %      Atypical Lymphocyte % 6.0 %      Neutrophils Absolute 4.11 10*3/mm3      Lymphocytes Absolute 1.27 10*3/mm3      Monocytes Absolute 0.06 10*3/mm3      Microcytes Slight/1+     Vacuolated Neutrophils Slight/1+     Platelet Morphology Normal    CBC & Differential [[id number]] Collected:  05/09/19 2205    Specimen:  Blood Updated:  05/09/19 2239    Narrative:       The following orders were created for panel order CBC & Differential.  Procedure                               Abnormality         Status                     ---------                               -----------         ------                     CBC Auto Differential[[id number]]        Abnormal            Final result                 Please view results for these tests on the individual orders.    CBC Auto Differential [[id number]]  (Abnormal) Collected:  05/09/19 2205    Specimen:  Blood Updated:  05/09/19 2239     WBC 5.79 10*3/mm3      RBC 3.91 10*6/mm3      Hemoglobin 12.4 g/dL      Hematocrit 35.4 %      MCV 90.5 fL      MCH 31.7 pg      MCHC 35.0 g/dL      RDW 13.1 %      RDW-SD 43.4 fl      MPV 8.8 fL      Platelets 244 10*3/mm3     Comprehensive Metabolic Panel [[id number]]  (Abnormal) Collected:  05/09/19 2205    Specimen:  Blood Updated:  05/09/19 2227     Glucose 102 mg/dL      BUN 11 mg/dL      Creatinine 1.01 mg/dL      Sodium 132 mmol/L      Potassium 3.8 mmol/L      Chloride 91 mmol/L      CO2 33.0 mmol/L      Calcium 9.3 mg/dL      Total Protein 7.4 g/dL      Albumin 4.10 g/dL      ALT (SGPT) 22 U/L      AST (SGOT) 27 U/L      Alkaline Phosphatase 64 U/L      Total Bilirubin 0.3 mg/dL      eGFR  African Amer 98 mL/min/1.73      Globulin  3.3 gm/dL      A/G Ratio 1.2 g/dL      BUN/Creatinine Ratio 10.9     Anion Gap 8.0 mmol/L     Narrative:       GFR Normal >60  Chronic Kidney Disease <60  Kidney Failure <15        Patient Active Problem List   Diagnosis   • Seizure (CMS/HCC)   • History of resection of meningioma   • Chronic intractable headache   • MARIA LUISA on CPAP   • Nonintractable headache       CT of head without contrast reviewed by me.  I do agree there is an abnormal mass along the floor of the frontal cranial fossa that extends into the frontal sinuses.  Above this is a pre-existing area of encephalomalacia that is likely postsurgical from the previous meningioma resections.  CT venogram unremarkable.    Impression:    1.  Holocephalic headache that is likely multifactorial in nature.  I find no evidence of active hemorrhage on examination.  He was found to have a mass which may or may not be contributing to this.  Symptomatic treatment of this will remain difficult secondary to his restrictions on mind altering medications.  I think would be reasonable to use Compazine and tramadol.  I explained to the patient that my goal is the diagnosis of the mass lesion and a treatment plan.  It may be that we do not obtain complete pain-free him given the fact that he does have a chronic headache and I have limited options secondary to the contract he has with [facility].  2.  Frontal mass lesion with extension into the sinuses -difficult to say the etiology behind this.  I would like to consult both neurosurgery and ENT.  I find minimal evidence of this being an infection given the fact that he is afebrile with no elevation of white count as well.  If either of the consultants believe that this may represent infection and believe that a consult to infectious disease is warranted please not hesitate to request this consult or as stated in your note that you wish for me to do this.  3.  History of drug dependence  4.  S/P bilateral meningioma  resection  5.  MARIA LUISA on CPAP    Plan:  · ENT consult  · Neurosurgery consult  · Blood cultures pending  · MRI Brain with and without pending  · Depacon given for headache relief  · Will add compazine for symptomatic treatment  · Avoid mind altering medications in accordance with pain contract patient made with [facility]  · Tobacco cessation counseling      Moise Crawley MD  05/10/19  10:48 AM      Electronically signed by Moise Crawley MD at 5/10/2019 10:57 AM          Emergency Department Notes      Florencio Spivey MD at 5/9/2019  9:44 PM          Subjective   History of Present Illness    43-year-old male presenting with headache.    Patient notes onset of headache approximately 5 days ago, gradual onset, gradually worsening, severe, holocephalic the previous headache.  Patient does note a preceding small facial abscess with surrounding swelling which has since resolved.  Patient does have a history of brain tumor requiring resection.    Patient denies any associated weakness, numbness, tingling, double vision, blurry vision, confusion, trauma.  Patient denies any fevers, neck pain, neck stiffness.    Review of Systems   Constitutional: Negative for fever.   HENT: Positive for congestion. Negative for sinus pain.    Eyes: Negative for pain.   Respiratory: Negative for shortness of breath.    Cardiovascular: Negative for chest pain.   Gastrointestinal: Negative for abdominal pain.   Genitourinary: Negative for flank pain.   Musculoskeletal: Negative for back pain.   Skin: Negative for wound.   Neurological: Positive for headaches. Negative for syncope and weakness.   Psychiatric/Behavioral: The patient is not hyperactive.      Objective   Physical Exam   Constitutional: He appears well-developed and well-nourished.   HENT:   Head: Normocephalic and atraumatic.   Eyes: Conjunctivae are normal.   Neck: Normal range of motion.   Cardiovascular: Normal rate, regular rhythm and intact distal  pulses.   Pulmonary/Chest: Effort normal and breath sounds normal. No respiratory distress.   Abdominal: Soft. He exhibits no distension. There is no tenderness.   Musculoskeletal: He exhibits no edema.   Neurological:   AxOx3  CN II-XII intact  EOMI, PERRLA  5/5 RUE  5/5 LUE  5/5 RLE  5/5 LLE  Sensation to light touch intact in all extremities  Romberg negative  Gait normal     Skin: Skin is warm and dry. Capillary refill takes less than 2 seconds.   Psychiatric: He has a normal mood and affect.   Nursing note and vitals reviewed.      Procedures          ED Course              Endorsed to me: MRV is concerning for neoplasm or abscess. I spoke with Dr. Crawley who reccomends admission to his service and 1000 mg of IV depakon for his headache.               MDM  Number of Diagnoses or Management Options  Diagnosis management comments: 43-year-old male presenting with 5 days of intractable holocephalic headache.  Patient does have a history of brain tumor in the past.  Work-up will include CT of the head.  Also on the differential is a dural sinus thrombosis given recent facial infection.  This will be evaluated with a CT venogram.    Pending at time of turnover to Dr. Spivey.        Final diagnoses:   Nonintractable headache, unspecified chronicity pattern, unspecified headache type   Intracranial mass            Florencio Spivey MD  05/10/19 0156      Electronically signed by Florencio Spivey MD at 5/10/2019  1:56 AM         Lines, Drains & Airways    Active LDAs     Name:   Placement date:   Placement time:   Site:   Days:    Peripheral IV 05/11/19 1100 Anterior;Left;Proximal Forearm   05/11/19    1100    Forearm   less than 1         Inactive LDAs     Name:   Placement date:   Placement time:   Removal date:   Removal time:   Site:   Days:    [REMOVED] Peripheral IV 10/15/18 0431 Left Antecubital   10/15/18    0431    05/09/19    2310    Antecubital   206    [REMOVED] Peripheral IV 05/09/19 2215 Right  Antecubital   05/09/19    2215    05/10/19 not present on assessment    0345    Antecubital   less than 1    [REMOVED] Peripheral IV 05/10/19 0345 Left Hand   05/10/19    0345    05/11/19    1051    Hand   1                Hospital Medications (active)       Dose Frequency Start End    acetaminophen (TYLENOL) tablet 650 mg 650 mg Every 4 Hours PRN 5/10/2019     Sig - Route: Take 2 tablets by mouth Every 4 (Four) Hours As Needed for Mild Pain . - Oral    amitriptyline (ELAVIL) tablet 50 mg 50 mg Nightly 5/10/2019     Sig - Route: Take 2 tablets by mouth Every Night. - Oral    carBAMazepine (TEGretol) tablet 400 mg 400 mg 2 Times Daily 5/10/2019     Sig - Route: Take 2 tablets by mouth 2 (Two) Times a Day. - Oral    cefepime (MAXIPIME) 1 g/100 mL 0.9% NS IVPB (mbp) 1 g Every 8 Hours 5/11/2019 5/25/2019    Sig - Route: Infuse 100 mL into a venous catheter Every 8 (Eight) Hours. - Intravenous    dexamethasone (DECADRON) tablet 4 mg 4 mg Every 6 Hours Scheduled 5/10/2019     Sig - Route: Take 1 tablet by mouth Every 6 (Six) Hours. - Oral    dextrose (D50W) 25 g/ 50mL Intravenous Solution 25 g 25 g Every 15 Minutes PRN 5/11/2019     Sig - Route: Infuse 50 mL into a venous catheter Every 15 (Fifteen) Minutes As Needed for Low Blood Sugar (Blood Sugar Less Than 70). - Intravenous    dextrose (GLUTOSE) oral gel 15 g 15 g Every 15 Minutes PRN 5/11/2019     Sig - Route: Take 15 application by mouth Every 15 (Fifteen) Minutes As Needed for Low Blood Sugar (Blood sugar less than 70). - Oral    glucagon (human recombinant) (GLUCAGEN DIAGNOSTIC) injection 1 mg 1 mg As Needed 5/11/2019     Sig - Route: Inject 1 mg under the skin into the appropriate area as directed As Needed (Blood Glucose Less Than 70). - Subcutaneous    hydrALAZINE (APRESOLINE) tablet 10 mg 10 mg Daily 5/10/2019     Sig - Route: Take 1 tablet by mouth Daily. - Oral    hydrochlorothiazide (HYDRODIURIL) tablet 25 mg 25 mg Daily 5/10/2019     Sig - Route: Take 1  tablet by mouth Daily. - Oral    insulin lispro (humaLOG) injection 2-7 Units 2-7 Units 4 Times Daily With Meals & Nightly 5/11/2019     Sig - Route: Inject 2-7 Units under the skin into the appropriate area as directed 4 (Four) Times a Day With Meals & at Bedtime. - Subcutaneous    ketorolac (TORADOL) injection 15 mg 15 mg Every 6 Hours PRN 5/10/2019 5/14/2019    Sig - Route: Infuse 0.5 mL into a venous catheter Every 6 (Six) Hours As Needed for Moderate Pain  (headache). - Intravenous    lactated ringers infusion 100 mL/hr Continuous 5/10/2019     Sig - Route: Infuse 100 mL/hr into a venous catheter Continuous. - Intravenous    metroNIDAZOLE (FLAGYL) IVPB 500 mg 500 mg Every 8 Hours 5/11/2019 5/25/2019    Sig - Route: Infuse 100 mL into a venous catheter Every 8 (Eight) Hours. - Intravenous    ondansetron (ZOFRAN) injection 4 mg 4 mg Every 6 Hours PRN 5/10/2019     Sig - Route: Infuse 2 mL into a venous catheter Every 6 (Six) Hours As Needed for Nausea or Vomiting. - Intravenous    pantoprazole (PROTONIX) EC tablet 40 mg 40 mg Every Early Morning 5/12/2019     Sig - Route: Take 1 tablet by mouth Every Morning. - Oral    prochlorperazine (COMPAZINE) injection 5 mg 5 mg Every 6 Hours PRN 5/10/2019     Sig - Route: Infuse 1 mL into a venous catheter Every 6 (Six) Hours As Needed (headache). - Intravenous    sodium chloride 0.9 % flush 10 mL 10 mL As Needed 5/9/2019     Sig - Route: Infuse 10 mL into a venous catheter As Needed for Line Care. - Intravenous    sodium chloride 0.9 % flush 3 mL 3 mL Every 12 Hours Scheduled 5/10/2019     Sig - Route: Infuse 3 mL into a venous catheter Every 12 (Twelve) Hours. - Intravenous    sodium chloride 0.9 % flush 3-10 mL 3-10 mL As Needed 5/10/2019     Sig - Route: Infuse 3-10 mL into a venous catheter As Needed for Line Care. - Intravenous    traMADol (ULTRAM) tablet 50 mg 50 mg Every 6 Hours PRN 5/10/2019 5/20/2019    Sig - Route: Take 1 tablet by mouth Every 6 (Six) Hours As  Needed for Moderate Pain . - Oral    vancomycin (VANCOCIN) 1,750 mg in sodium chloride 0.9 % 500 mL IVPB 15 mg/kg × 117 kg Every 12 Hours 5/11/2019 5/25/2019    Sig - Route: Infuse 1,750 mg into a venous catheter Every 12 (Twelve) Hours. - Intravenous    meperidine (DEMEROL) injection 25 mg (Discontinued) 25 mg Once 5/10/2019 5/11/2019    Sig - Route: Infuse 1 mL into a venous catheter 1 (One) Time. - Intravenous    Reason for Discontinue: *Error    Pharmacy to dose vancomycin (Discontinued)  Continuous PRN 5/11/2019 5/11/2019    Sig - Route: Continuous As Needed for Consult. - Does not apply          Lab Results (last 48 hours)     Procedure Component Value Units Date/Time    POC Glucose Once [[id number]]  (Abnormal) Collected:  05/11/19 1332    Specimen:  Blood Updated:  05/11/19 1343     Glucose 150 mg/dL      Comment: : [id number] Elsi Wells[place]) CarolynMeter ID: [id number]       C-reactive Protein [[id number]]  (Abnormal) Collected:  05/11/19 0526    Specimen:  Blood Updated:  05/11/19 1245     C-Reactive Protein 13.53 mg/dL     Blood Culture - Blood, Arm, Right [[id number]] Collected:  05/10/19 0837    Specimen:  Blood from Arm, Right Updated:  05/11/19 0900     Blood Culture No growth at 24 hours    Blood Culture - Blood, Hand, Right [[id number]] Collected:  05/10/19 0844    Specimen:  Blood from Hand, Right Updated:  05/11/19 0900     Blood Culture No growth at 24 hours    Basic Metabolic Panel [[id number]]  (Abnormal) Collected:  05/11/19 0526    Specimen:  Blood Updated:  05/11/19 0555     Glucose 95 mg/dL      BUN 13 mg/dL      Creatinine 1.12 mg/dL      Sodium 134 mmol/L      Potassium 4.7 mmol/L      Chloride 95 mmol/L      CO2 31.0 mmol/L      Calcium 9.3 mg/dL      eGFR   Amer 87 mL/min/1.73      BUN/Creatinine Ratio 11.6     Anion Gap 8.0 mmol/L     Narrative:       GFR Normal >60  Chronic Kidney Disease <60  Kidney Failure <15    CBC & Differential [[id number]] Collected:  05/11/19 0526     Specimen:  Blood Updated:  05/11/19 0546    Narrative:       The following orders were created for panel order CBC & Differential.  Procedure                               Abnormality         Status                     ---------                               -----------         ------                     CBC Auto Differential[[id number]]        Abnormal            Final result                 Please view results for these tests on the individual orders.    CBC Auto Differential [[id number]]  (Abnormal) Collected:  05/11/19 0526    Specimen:  Blood Updated:  05/11/19 0546     WBC 5.46 10*3/mm3      RBC 4.05 10*6/mm3      Hemoglobin 12.8 g/dL      Hematocrit 37.4 %      MCV 92.3 fL      MCH 31.6 pg      MCHC 34.2 g/dL      RDW 12.7 %      RDW-SD 43.8 fl      MPV 8.8 fL      Platelets 270 10*3/mm3      Neutrophil % 66.2 %      Lymphocyte % 24.2 %      Monocyte % 8.8 %      Eosinophil % 0.4 %      Basophil % 0.2 %      Immature Grans % 0.2 %      Neutrophils, Absolute 3.62 10*3/mm3      Lymphocytes, Absolute 1.32 10*3/mm3      Monocytes, Absolute 0.48 10*3/mm3      Eosinophils, Absolute 0.02 10*3/mm3      Basophils, Absolute 0.01 10*3/mm3      Immature Grans, Absolute 0.01 10*3/mm3      nRBC 0.0 /100 WBC     CBC & Differential [[id number]] Collected:  05/10/19 0837    Specimen:  Blood Updated:  05/10/19 0932    Narrative:       The following orders were created for panel order CBC & Differential.  Procedure                               Abnormality         Status                     ---------                               -----------         ------                     CBC Auto Differential[[id number]]        Abnormal            Final result                 Please view results for these tests on the individual orders.    CBC Auto Differential [[id number]]  (Abnormal) Collected:  05/10/19 0837    Specimen:  Blood Updated:  05/10/19 0932     WBC 4.48 10*3/mm3      RBC 4.00 10*6/mm3      Hemoglobin 12.7 g/dL      Hematocrit 36.1  %      MCV 90.3 fL      MCH 31.8 pg      MCHC 35.2 g/dL      RDW 13.0 %      RDW-SD 42.8 fl      MPV 8.8 fL      Platelets 253 10*3/mm3     Narrative:       The previously reported component NRBC is no longer being reported.    Manual Differential [[id number]]  (Abnormal) Collected:  05/10/19 0837    Specimen:  Blood Updated:  05/10/19 0932     Neutrophil % 76.3 %      Lymphocyte % 14.4 %      Monocyte % 7.2 %      Atypical Lymphocyte % 2.1 %      Neutrophils Absolute 3.42 10*3/mm3      Lymphocytes Absolute 0.65 10*3/mm3      Monocytes Absolute 0.32 10*3/mm3      RBC Morphology Normal     WBC Morphology Normal     Platelet Morphology Normal    Basic Metabolic Panel [[id number]]  (Abnormal) Collected:  05/10/19 0837    Specimen:  Blood Updated:  05/10/19 0911     Glucose 104 mg/dL      BUN 10 mg/dL      Creatinine 0.98 mg/dL      Sodium 134 mmol/L      Potassium 3.9 mmol/L      Chloride 95 mmol/L      CO2 30.0 mmol/L      Calcium 9.4 mg/dL      eGFR  African Amer 101 mL/min/1.73      BUN/Creatinine Ratio 10.2     Anion Gap 9.0 mmol/L     Narrative:       GFR Normal >60  Chronic Kidney Disease <60  Kidney Failure <15    CBC & Differential [[id number]] Collected:  05/10/19 0454    Specimen:  Blood Updated:  05/10/19 0618    Narrative:       The following orders were created for panel order CBC & Differential.  Procedure                               Abnormality         Status                     ---------                               -----------         ------                     Manual Differential[[id number]]                                                         CBC Auto Differential[[id number]]        Abnormal            Final result                 Please view results for these tests on the individual orders.    CBC Auto Differential [[id number]]  (Abnormal) Collected:  05/10/19 0454    Specimen:  Blood Updated:  05/10/19 0618     WBC 5.42 10*3/mm3      RBC 3.81 10*6/mm3      Hemoglobin 12.0 g/dL      Hematocrit 34.7  %      MCV 91.1 fL      MCH 31.5 pg      MCHC 34.6 g/dL      RDW 13.2 %      RDW-SD 43.3 fl      MPV 9.2 fL      Platelets 256 10*3/mm3     Manual Differential [[id number]]  (Normal) Collected:  05/10/19 0454    Specimen:  Blood Updated:  05/10/19 0618     Neutrophil % 58.0 %      Lymphocyte % 32.0 %      Monocyte % 5.0 %      Eosinophil % 3.0 %      Atypical Lymphocyte % 2.0 %      Neutrophils Absolute 3.14 10*3/mm3      Lymphocytes Absolute 1.73 10*3/mm3      Monocytes Absolute 0.27 10*3/mm3      Eosinophils Absolute 0.16 10*3/mm3      RBC Morphology Normal     WBC Morphology Normal     Platelet Morphology Normal    Carbamazepine Level, Total [[id number]]  (Normal) Collected:  05/09/19 2205    Specimen:  Blood Updated:  05/10/19 0003     Carbamazepine Level 9.1 mcg/mL     [place] Draw [[id number]] Collected:  05/09/19 2205    Specimen:  Blood Updated:  05/09/19 2315    Narrative:       The following orders were created for panel order [place] Draw.  Procedure                               Abnormality         Status                     ---------                               -----------         ------                     Light Blue Top[[id number]]                                   Final result               Green Top (Gel)[[id number]]                                  Final result               Lavender Top[[id number]]                                     Final result               Red Top[[id number]]                                          Final result                 Please view results for these tests on the individual orders.    Light Blue Top [[id number]] Collected:  05/09/19 2205    Specimen:  Blood Updated:  05/09/19 2315     Extra Tube hold for add-on     Comment: Auto resulted       Green Top (Gel) [[id number]] Collected:  05/09/19 2205    Specimen:  Blood Updated:  05/09/19 2315     Extra Tube Hold for add-ons.     Comment: Auto resulted.       Lavender Top [[id number]] Collected:  05/09/19 2205    Specimen:  Blood  Updated:  05/09/19 2315     Extra Tube hold for add-on     Comment: Auto resulted       Red Top [[id number]] Collected:  05/09/19 2205    Specimen:  Blood Updated:  05/09/19 2315     Extra Tube Hold for add-ons.     Comment: Auto resulted.       Manual Differential [[id number]]  (Abnormal) Collected:  05/09/19 2205    Specimen:  Blood Updated:  05/09/19 2239     Neutrophil % 71.0 %      Lymphocyte % 22.0 %      Monocyte % 1.0 %      Atypical Lymphocyte % 6.0 %      Neutrophils Absolute 4.11 10*3/mm3      Lymphocytes Absolute 1.27 10*3/mm3      Monocytes Absolute 0.06 10*3/mm3      Microcytes Slight/1+     Vacuolated Neutrophils Slight/1+     Platelet Morphology Normal    CBC & Differential [[id number]] Collected:  05/09/19 2205    Specimen:  Blood Updated:  05/09/19 2239    Narrative:       The following orders were created for panel order CBC & Differential.  Procedure                               Abnormality         Status                     ---------                               -----------         ------                     CBC Auto Differential[[id number]]        Abnormal            Final result                 Please view results for these tests on the individual orders.    CBC Auto Differential [[id number]]  (Abnormal) Collected:  05/09/19 2205    Specimen:  Blood Updated:  05/09/19 2239     WBC 5.79 10*3/mm3      RBC 3.91 10*6/mm3      Hemoglobin 12.4 g/dL      Hematocrit 35.4 %      MCV 90.5 fL      MCH 31.7 pg      MCHC 35.0 g/dL      RDW 13.1 %      RDW-SD 43.4 fl      MPV 8.8 fL      Platelets 244 10*3/mm3     Comprehensive Metabolic Panel [[id number]]  (Abnormal) Collected:  05/09/19 2205    Specimen:  Blood Updated:  05/09/19 2227     Glucose 102 mg/dL      BUN 11 mg/dL      Creatinine 1.01 mg/dL      Sodium 132 mmol/L      Potassium 3.8 mmol/L      Chloride 91 mmol/L      CO2 33.0 mmol/L      Calcium 9.3 mg/dL      Total Protein 7.4 g/dL      Albumin 4.10 g/dL      ALT (SGPT) 22 U/L      AST (SGOT) 27  U/L      Alkaline Phosphatase 64 U/L      Total Bilirubin 0.3 mg/dL      eGFR  African Amer 98 mL/min/1.73      Globulin 3.3 gm/dL      A/G Ratio 1.2 g/dL      BUN/Creatinine Ratio 10.9     Anion Gap 8.0 mmol/L     Narrative:       GFR Normal >60  Chronic Kidney Disease <60  Kidney Failure <15          Lab Results (last 48 hours)     Procedure Component Value Units Date/Time    POC Glucose Once [[id number]]  (Abnormal) Collected:  05/11/19 1332    Specimen:  Blood Updated:  05/11/19 1343     Glucose 150 mg/dL      Comment: : [id number] Elsi ([place]) CarolynMeter ID: [id number]       C-reactive Protein [[id number]]  (Abnormal) Collected:  05/11/19 0526    Specimen:  Blood Updated:  05/11/19 1245     C-Reactive Protein 13.53 mg/dL     Blood Culture - Blood, Arm, Right [[id number]] Collected:  05/10/19 0837    Specimen:  Blood from Arm, Right Updated:  05/11/19 0900     Blood Culture No growth at 24 hours    Blood Culture - Blood, Hand, Right [[id number]] Collected:  05/10/19 0844    Specimen:  Blood from Hand, Right Updated:  05/11/19 0900     Blood Culture No growth at 24 hours    Basic Metabolic Panel [[id number]]  (Abnormal) Collected:  05/11/19 0526    Specimen:  Blood Updated:  05/11/19 0555     Glucose 95 mg/dL      BUN 13 mg/dL      Creatinine 1.12 mg/dL      Sodium 134 mmol/L      Potassium 4.7 mmol/L      Chloride 95 mmol/L      CO2 31.0 mmol/L      Calcium 9.3 mg/dL      eGFR   Amer 87 mL/min/1.73      BUN/Creatinine Ratio 11.6     Anion Gap 8.0 mmol/L     Narrative:       GFR Normal >60  Chronic Kidney Disease <60  Kidney Failure <15    CBC & Differential [[id number]] Collected:  05/11/19 0526    Specimen:  Blood Updated:  05/11/19 0546    Narrative:       The following orders were created for panel order CBC & Differential.  Procedure                               Abnormality         Status                     ---------                               -----------         ------                      CBC Auto Differential[[id number]]        Abnormal            Final result                 Please view results for these tests on the individual orders.    CBC Auto Differential [[id number]]  (Abnormal) Collected:  05/11/19 0526    Specimen:  Blood Updated:  05/11/19 0546     WBC 5.46 10*3/mm3      RBC 4.05 10*6/mm3      Hemoglobin 12.8 g/dL      Hematocrit 37.4 %      MCV 92.3 fL      MCH 31.6 pg      MCHC 34.2 g/dL      RDW 12.7 %      RDW-SD 43.8 fl      MPV 8.8 fL      Platelets 270 10*3/mm3      Neutrophil % 66.2 %      Lymphocyte % 24.2 %      Monocyte % 8.8 %      Eosinophil % 0.4 %      Basophil % 0.2 %      Immature Grans % 0.2 %      Neutrophils, Absolute 3.62 10*3/mm3      Lymphocytes, Absolute 1.32 10*3/mm3      Monocytes, Absolute 0.48 10*3/mm3      Eosinophils, Absolute 0.02 10*3/mm3      Basophils, Absolute 0.01 10*3/mm3      Immature Grans, Absolute 0.01 10*3/mm3      nRBC 0.0 /100 WBC     CBC & Differential [[id number]] Collected:  05/10/19 0837    Specimen:  Blood Updated:  05/10/19 0932    Narrative:       The following orders were created for panel order CBC & Differential.  Procedure                               Abnormality         Status                     ---------                               -----------         ------                     CBC Auto Differential[[id number]]        Abnormal            Final result                 Please view results for these tests on the individual orders.    CBC Auto Differential [[id number]]  (Abnormal) Collected:  05/10/19 0837    Specimen:  Blood Updated:  05/10/19 0932     WBC 4.48 10*3/mm3      RBC 4.00 10*6/mm3      Hemoglobin 12.7 g/dL      Hematocrit 36.1 %      MCV 90.3 fL      MCH 31.8 pg      MCHC 35.2 g/dL      RDW 13.0 %      RDW-SD 42.8 fl      MPV 8.8 fL      Platelets 253 10*3/mm3     Narrative:       The previously reported component NRBC is no longer being reported.    Manual Differential [[id number]]  (Abnormal) Collected:  05/10/19 0837     Specimen:  Blood Updated:  05/10/19 0932     Neutrophil % 76.3 %      Lymphocyte % 14.4 %      Monocyte % 7.2 %      Atypical Lymphocyte % 2.1 %      Neutrophils Absolute 3.42 10*3/mm3      Lymphocytes Absolute 0.65 10*3/mm3      Monocytes Absolute 0.32 10*3/mm3      RBC Morphology Normal     WBC Morphology Normal     Platelet Morphology Normal    Basic Metabolic Panel [[id number]]  (Abnormal) Collected:  05/10/19 0837    Specimen:  Blood Updated:  05/10/19 0911     Glucose 104 mg/dL      BUN 10 mg/dL      Creatinine 0.98 mg/dL      Sodium 134 mmol/L      Potassium 3.9 mmol/L      Chloride 95 mmol/L      CO2 30.0 mmol/L      Calcium 9.4 mg/dL      eGFR  African Amer 101 mL/min/1.73      BUN/Creatinine Ratio 10.2     Anion Gap 9.0 mmol/L     Narrative:       GFR Normal >60  Chronic Kidney Disease <60  Kidney Failure <15    CBC & Differential [[id number]] Collected:  05/10/19 0454    Specimen:  Blood Updated:  05/10/19 0618    Narrative:       The following orders were created for panel order CBC & Differential.  Procedure                               Abnormality         Status                     ---------                               -----------         ------                     Manual Differential[[id number]]                                                         CBC Auto Differential[[id number]]        Abnormal            Final result                 Please view results for these tests on the individual orders.    CBC Auto Differential [[id number]]  (Abnormal) Collected:  05/10/19 0454    Specimen:  Blood Updated:  05/10/19 0618     WBC 5.42 10*3/mm3      RBC 3.81 10*6/mm3      Hemoglobin 12.0 g/dL      Hematocrit 34.7 %      MCV 91.1 fL      MCH 31.5 pg      MCHC 34.6 g/dL      RDW 13.2 %      RDW-SD 43.3 fl      MPV 9.2 fL      Platelets 256 10*3/mm3     Manual Differential [[id number]]  (Normal) Collected:  05/10/19 0454    Specimen:  Blood Updated:  05/10/19 0618     Neutrophil % 58.0 %      Lymphocyte % 32.0 %       Monocyte % 5.0 %      Eosinophil % 3.0 %      Atypical Lymphocyte % 2.0 %      Neutrophils Absolute 3.14 10*3/mm3      Lymphocytes Absolute 1.73 10*3/mm3      Monocytes Absolute 0.27 10*3/mm3      Eosinophils Absolute 0.16 10*3/mm3      RBC Morphology Normal     WBC Morphology Normal     Platelet Morphology Normal    Carbamazepine Level, Total [[id number]]  (Normal) Collected:  05/09/19 2205    Specimen:  Blood Updated:  05/10/19 0003     Carbamazepine Level 9.1 mcg/mL     [place] Draw [[id number]] Collected:  05/09/19 2205    Specimen:  Blood Updated:  05/09/19 2315    Narrative:       The following orders were created for panel order [place] Draw.  Procedure                               Abnormality         Status                     ---------                               -----------         ------                     Light Blue Top[[id number]]                                   Final result               Green Top (Gel)[[id number]]                                  Final result               Lavender Top[[id number]]                                     Final result               Red Top[[id number]]                                          Final result                 Please view results for these tests on the individual orders.    Light Blue Top [[id number]] Collected:  05/09/19 2205    Specimen:  Blood Updated:  05/09/19 2315     Extra Tube hold for add-on     Comment: Auto resulted       Green Top (Gel) [[id number]] Collected:  05/09/19 2205    Specimen:  Blood Updated:  05/09/19 2315     Extra Tube Hold for add-ons.     Comment: Auto resulted.       Lavender Top [[id number]] Collected:  05/09/19 2205    Specimen:  Blood Updated:  05/09/19 2315     Extra Tube hold for add-on     Comment: Auto resulted       Red Top [[id number]] Collected:  05/09/19 2205    Specimen:  Blood Updated:  05/09/19 2315     Extra Tube Hold for add-ons.     Comment: Auto resulted.       Manual Differential [[id number]]  (Abnormal) Collected:   05/09/19 2205    Specimen:  Blood Updated:  05/09/19 2239     Neutrophil % 71.0 %      Lymphocyte % 22.0 %      Monocyte % 1.0 %      Atypical Lymphocyte % 6.0 %      Neutrophils Absolute 4.11 10*3/mm3      Lymphocytes Absolute 1.27 10*3/mm3      Monocytes Absolute 0.06 10*3/mm3      Microcytes Slight/1+     Vacuolated Neutrophils Slight/1+     Platelet Morphology Normal    CBC & Differential [[id number]] Collected:  05/09/19 2205    Specimen:  Blood Updated:  05/09/19 2239    Narrative:       The following orders were created for panel order CBC & Differential.  Procedure                               Abnormality         Status                     ---------                               -----------         ------                     CBC Auto Differential[[id number]]        Abnormal            Final result                 Please view results for these tests on the individual orders.    CBC Auto Differential [[id number]]  (Abnormal) Collected:  05/09/19 2205    Specimen:  Blood Updated:  05/09/19 2239     WBC 5.79 10*3/mm3      RBC 3.91 10*6/mm3      Hemoglobin 12.4 g/dL      Hematocrit 35.4 %      MCV 90.5 fL      MCH 31.7 pg      MCHC 35.0 g/dL      RDW 13.1 %      RDW-SD 43.4 fl      MPV 8.8 fL      Platelets 244 10*3/mm3     Comprehensive Metabolic Panel [[id number]]  (Abnormal) Collected:  05/09/19 2205    Specimen:  Blood Updated:  05/09/19 2227     Glucose 102 mg/dL      BUN 11 mg/dL      Creatinine 1.01 mg/dL      Sodium 132 mmol/L      Potassium 3.8 mmol/L      Chloride 91 mmol/L      CO2 33.0 mmol/L      Calcium 9.3 mg/dL      Total Protein 7.4 g/dL      Albumin 4.10 g/dL      ALT (SGPT) 22 U/L      AST (SGOT) 27 U/L      Alkaline Phosphatase 64 U/L      Total Bilirubin 0.3 mg/dL      eGFR  African Amer 98 mL/min/1.73      Globulin 3.3 gm/dL      A/G Ratio 1.2 g/dL      BUN/Creatinine Ratio 10.9     Anion Gap 8.0 mmol/L     Narrative:       GFR Normal >60  Chronic Kidney Disease <60  Kidney Failure <15           Orders (last 48 hrs)     Start     Ordered    05/12/19 0600  pantoprazole (PROTONIX) EC tablet 40 mg  Every Early Morning      05/11/19 1253    05/11/19 1700  POC Glucose 4x Daily AC & at Bedtime  4 Times Daily Before Meals & at Bedtime      05/11/19 1253    05/11/19 1345  insulin lispro (humaLOG) injection 2-7 Units  4 Times Daily With Meals & Nightly      05/11/19 1253    05/11/19 1344  POC Glucose Once  Once      05/11/19 1332    05/11/19 1253  Do NOT Hold Basal Insulin When Patient is NPO, Hold Bolus Dose if NPO  Continuous      05/11/19 1253    05/11/19 1253  Follow [facility] Hypoglycemia Standing Orders For Blood Glucose Less Than 70 mg/dL  Until Discontinued      05/11/19 1253    05/11/19 1253  Hypoglycemia Treatment - Alert Patient That is Not NPO and Can Safely Swallow  Until Discontinued     Comments:  Administer 4 oz Fruit Juice OR 4 oz Regular Soda OR 8 oz Milk OR 15-30 grams (1 tube) of Glucose Gel.  Recheck Blood Glucose 15 Minutes After Ingestion, Repeat Treatment & Continue to Recheck Blood Sugar Every 15 Minutes Until Blood Glucose is 70 mg/dL or Higher.  Once Blood Glucose is 70 mg/dL or Higher and if It Will Be more than 60 Minutes Until the Next Meal, Provide Appropriate Snack (Including Carbohydrate Food) Based on Meal Plan Order. Give Meal Tray As Soon As Possible.    05/11/19 1253    05/11/19 1253  Hypoglycemia Treatment - Patient Has IV Access - Unresponsive, NPO or Unable To Safely Swallow  Until Discontinued     Comments:  Administer 25g (50ml) D50W IV Push.  Recheck Blood Glucose 15 Minutes After Administration, if Blood Glucose Remains Less Than 70 mg/dl, Repeat Treatment   Recheck Blood Glucose 15 Minutes After 2nd Administration, if Blood Glucose Remains Less Than 70 mg/dL After 2nd Dose of D50W, Contact Provider for Further Treatment Orders & Consider Adding IVF With D5W for Maintenance    05/11/19 1253    05/11/19 1253  Hypoglycemia Treatment - Patient Without IV Access -  Unresponsive, NPO or Unable To Safely Swallow  Until Discontinued     Comments:  Administer 1mg Glucagon SQ & Establish IV Access.  Turn Patient on Side - Nausea / Vomiting May Occur.  Recheck Blood Glucose 15 Minutes After Administration.  If Blood Glucose Remains Less Than 70, Administer 25g D50W IV Push (50ml).  Recheck Blood Glucose 15 Minutes After Administration of D50W, if Blood Glucose Remains Less Than 70 mg/dl, Contact Provider for Further Treatment Orders & Consider Adding IVF With D5 for Maintenance    05/11/19 1253    05/11/19 1253  Notify Provider of event. Communicate needs and document in EMR.  Once      05/11/19 1253    05/11/19 1253  Document event and patients response to treatment in EMR, any medications given to be documented on MAR.  Once      05/11/19 1253    05/11/19 1252  dextrose (GLUTOSE) oral gel 15 g  Every 15 Minutes PRN      05/11/19 1253    05/11/19 1252  dextrose (D50W) 25 g/ 50mL Intravenous Solution 25 g  Every 15 Minutes PRN      05/11/19 1253    05/11/19 1252  glucagon (human recombinant) (GLUCAGEN DIAGNOSTIC) injection 1 mg  As Needed      05/11/19 1253    05/11/19 1230  metroNIDAZOLE (FLAGYL) IVPB 500 mg  Every 8 Hours      05/11/19 1041    05/11/19 1204  C-reactive Protein  STAT      05/11/19 1036    05/11/19 1130  cefepime (MAXIPIME) 1 g/100 mL 0.9% NS IVPB (mbp)  Every 8 Hours      05/11/19 1041    05/11/19 1130  vancomycin (VANCOCIN) 1,750 mg in sodium chloride 0.9 % 500 mL IVPB  Every 12 Hours      05/11/19 1041    05/11/19 1039  Pharmacy to dose vancomycin  Continuous PRN,   Status:  Discontinued      05/11/19 1041    05/11/19 0600  CBC Auto Differential  PROCEDURE ONCE      05/11/19 0001    05/10/19 2100  amitriptyline (ELAVIL) tablet 50 mg  Nightly      05/10/19 0817    05/10/19 2100  dexamethasone (DECADRON) tablet 4 mg  Every 6 Hours Scheduled      05/10/19 2014    05/10/19 2013  ketorolac (TORADOL) injection 15 mg  Every 6 Hours PRN      05/10/19 2014    05/10/19  1907  CT Sinus Without Contrast  1 Time Imaging      05/10/19 1907    05/10/19 1615  gadobenate dimeglumine (MULTIHANCE) injection 20 mL  Once in Imaging      05/10/19 1526    05/10/19 1200  Vital Signs  Every 4 Hours      05/10/19 0817    05/10/19 1058  prochlorperazine (COMPAZINE) injection 5 mg  Every 6 Hours PRN      05/10/19 1058    05/10/19 1035  Inpatient Neurosurgery Consult  Once     Specialty:  Neurosurgery  Provider:  Jose Enrique Anguiano MD    05/10/19 1034    05/10/19 0915  carBAMazepine (TEGretol) tablet 400 mg  2 Times Daily      05/10/19 0817    05/10/19 0915  hydrALAZINE (APRESOLINE) tablet 10 mg  Daily      05/10/19 0817    05/10/19 0915  hydrochlorothiazide (HYDRODIURIL) tablet 25 mg  Daily      05/10/19 0817    05/10/19 0915  sodium chloride 0.9 % flush 3 mL  Every 12 Hours Scheduled      05/10/19 0817    05/10/19 0900  Manual Differential  Once      05/10/19 0859    05/10/19 0848  Inpatient ENT Consult  Once     Specialty:  Otolaryngology  Provider:  Mauro Brewer MD    05/10/19 0848    05/10/19 0818  Basic Metabolic Panel  Daily      05/10/19 0817    05/10/19 0818  CBC & Differential  Daily      05/10/19 0817    05/10/19 0818  CBC Auto Differential  PROCEDURE ONCE      05/10/19 0817    05/10/19 0817  ondansetron (ZOFRAN) injection 4 mg  Every 6 Hours PRN      05/10/19 0817    05/10/19 0817  Place Sequential Compression Device  Once      05/10/19 0817    05/10/19 0817  Maintain Sequential Compression Device  Continuous      05/10/19 0817    05/10/19 0817  Diet Regular  Diet Effective Now      05/10/19 0817    05/10/19 0817  Blood Culture - Blood,  Once      05/10/19 0817    05/10/19 0817  Blood Culture - Blood,  Once     Comments:  From Different Site Than 1st Blood Culture      05/10/19 0817    05/10/19 0816  traMADol (ULTRAM) tablet 50 mg  Every 6 Hours PRN      05/10/19 0817    05/10/19 0816  acetaminophen (TYLENOL) tablet 650 mg  Every 4 Hours PRN      05/10/19 0817    05/10/19 0816   Code Status and Medical Interventions:  Continuous      05/10/19 0817    05/10/19 0816  Intake & Output  Every Shift      05/10/19 0817    05/10/19 0816  Weigh Patient  Once      05/10/19 0817    05/10/19 0816  Insert Peripheral IV  Once      05/10/19 0817    05/10/19 0816  Saline Lock & Maintain IV Access  Continuous      05/10/19 0817    05/10/19 0815  sodium chloride 0.9 % flush 3-10 mL  As Needed      05/10/19 0817    05/10/19 0815  MRI Brain With & Without Contrast  1 Time Imaging      05/10/19 0815    05/10/19 0614  Manual Differential  Once      05/10/19 0613    05/10/19 0600  CBC & Differential  Morning Draw      05/10/19 0156    05/10/19 0600  Manual Differential  PROCEDURE ONCE,   Status:  Canceled      05/10/19 0156    05/10/19 0600  CBC Auto Differential  PROCEDURE ONCE      05/10/19 0156    05/10/19 0528  acetaminophen (TYLENOL) tablet 650 mg  Every 6 Hours PRN,   Status:  Discontinued      05/10/19 0528    05/10/19 0400  Vital Signs Every 4 Hours  Every 4 Hours      05/10/19 0156    05/10/19 0400  Neuro Checks  Every 4 Hours      05/10/19 0156    05/10/19 0159  valproate (DEPACON) 1,000 mg in sodium chloride 0.9 % 100 mL IVPB  Once      05/10/19 0157    05/10/19 0158  lactated ringers infusion  Continuous      05/10/19 0156    05/10/19 0157  NPO Diet  Diet Effective Now,   Status:  Canceled      05/10/19 0156    05/10/19 0156  Initiate Observation Status  Once      05/10/19 0155    05/10/19 0156  Activity As Tolerated  Until Discontinued      05/10/19 0156    05/10/19 0156  Weigh Patient  Once      05/10/19 0156    05/10/19 0155  valproate sodium (DEPACON) injection 1,000 mg  Once,   Status:  Discontinued      05/10/19 0153    05/10/19 0111  meperidine (DEMEROL) injection 25 mg  Once,   Status:  Discontinued      05/10/19 0109    05/09/19 2312  iopamidol (ISOVUE-370) 76 % injection 125 mL  Once in Imaging      05/09/19 2310    05/09/19 221  Manual Differential  Once      05/09/19 221    05/09/19 7802   sodium chloride 0.9 % bolus 500 mL  Once      05/09/19 2146 05/09/19 2148  acetaminophen (TYLENOL) tablet 500 mg  Once      05/09/19 2146 05/09/19 2148  metoclopramide (REGLAN) injection 10 mg  Once      05/09/19 2146 05/09/19 2148  diphenhydrAMINE (BENADRYL) injection 25 mg  Once      05/09/19 2146 05/09/19 2146  CT venogram head w contrast  1 Time Imaging      05/09/19 2145    05/09/19 2145  Insert peripheral IV  Once      05/09/19 2145    05/09/19 2145  [place] Draw  Once      05/09/19 2145    05/09/19 2145  CBC & Differential  Once      05/09/19 2145    05/09/19 2145  Comprehensive Metabolic Panel  Once      05/09/19 2145    05/09/19 2145  CT Head Without Contrast  1 Time Imaging      05/09/19 2145    05/09/19 2145  Light Blue Top  PROCEDURE ONCE      05/09/19 2145    05/09/19 2145  Green Top (Gel)  PROCEDURE ONCE      05/09/19 2145    05/09/19 2145  Lavender Top  PROCEDURE ONCE      05/09/19 2145    05/09/19 2145  Red Top  PROCEDURE ONCE      05/09/19 2145 05/09/19 2145  CBC Auto Differential  PROCEDURE ONCE      05/09/19 2145 05/09/19 2144  sodium chloride 0.9 % flush 10 mL  As Needed      05/09/19 2145 05/09/19 2144  Carbamazepine Level, Total  Once      05/09/19 2145    Unscheduled  Up With Assistance  As Needed      05/10/19 0817    --  SCANNED - IMAGING      05/09/19 0000    --  amitriptyline (ELAVIL) 50 MG tablet  Nightly      05/10/19 1311        Moise Crawley MD   Physician   Neurology   Progress Notes   Signed   Date of Service:  5/11/2019 11:52 AM   Creation Time:  5/11/2019 11:52 AM            Signed        Expand All Collapse All          Show:Clear all  [x]Manual[x]Template[x]Copied    Added by:  [x]Moise Crawley MD      []Julito for details        Neurology Progress Note        Chief Complaint:  Headache     Subjective      Subjective:     Headache improved today after steroids given by neurosurgery overnight.  The patient is anxious to go home although understands  the need for further work-up and treatment.  Both ENT and neurosurgery consulted on the patient overnight.  They have plans to discuss treatment plans further.  Neurosurgery began an antibiotic course with broad-spectrum antibiotics.  Medications:  Current Medications             Current Facility-Administered Medications   Medication Dose Route Frequency Provider Last Rate Last Dose   • acetaminophen (TYLENOL) tablet 650 mg  650 mg Oral Q4H PRN Moise Crawley MD   650 mg at 05/10/19 1552   • amitriptyline (ELAVIL) tablet 50 mg  50 mg Oral Nightly Moise Crawley MD   50 mg at 05/10/19 2111   • carBAMazepine (TEGretol) tablet 400 mg  400 mg Oral BID Moise Crawley MD   400 mg at 05/11/19 0914   • cefepime (MAXIPIME) 1 g/100 mL 0.9% NS IVPB (mbp)  1 g Intravenous Q8H Jose Enrique Anguiano  mL/hr at 05/11/19 1151 1 g at 05/11/19 1151   • dexamethasone (DECADRON) tablet 4 mg  4 mg Oral Q6H Jose Enrique Anguiano MD   4 mg at 05/11/19 0530   • hydrALAZINE (APRESOLINE) tablet 10 mg  10 mg Oral Daily Moise Crawley MD   10 mg at 05/11/19 0914   • hydrochlorothiazide (HYDRODIURIL) tablet 25 mg  25 mg Oral Daily Moise Crawley MD   25 mg at 05/11/19 0914   • ketorolac (TORADOL) injection 15 mg  15 mg Intravenous Q6H PRN Jose Enrique Anguiano MD   15 mg at 05/11/19 1149   • lactated ringers infusion  100 mL/hr Intravenous Continuous Florencio Spivey  mL/hr at 05/10/19 0523 100 mL/hr at 05/10/19 0523   • meperidine (DEMEROL) injection 25 mg  25 mg Intravenous Once Florencio Spivey MD       • metroNIDAZOLE (FLAGYL) IVPB 500 mg  500 mg Intravenous Q8H Jose Enrique Anguiano MD       • ondansetron (ZOFRAN) injection 4 mg  4 mg Intravenous Q6H PRN Moise Crawley MD       • prochlorperazine (COMPAZINE) injection 5 mg  5 mg Intravenous Q6H PRN Moise Crawley MD   5 mg at 05/11/19 1149   • sodium chloride 0.9 % flush 10 mL  10 mL Intravenous PRN Abiel Hills MD       •  sodium chloride 0.9 % flush 3 mL  3 mL Intravenous Q12H Moise Crawley MD   3 mL at 05/11/19 0914   • sodium chloride 0.9 % flush 3-10 mL  3-10 mL Intravenous PRN Moise Crawley MD   10 mL at 05/11/19 1151   • traMADol (ULTRAM) tablet 50 mg  50 mg Oral Q6H PRN Moise Crawley MD       • vancomycin (VANCOCIN) 1,750 mg in sodium chloride 0.9 % 500 mL IVPB  15 mg/kg Intravenous Q12H Jose Enrique Anguiano MD                Review of Systems:   -A 14 point review of systems is completed and is negative except for headache        Objective       Vital Signs  Temp:  [98.5 °F (36.9 °C)-99 °F (37.2 °C)] 98.5 °F (36.9 °C)  Heart Rate:  [85-96] 96  Resp:  [16-18] 18  BP: (131-151)/(87-99) 131/93     Physical Exam:     General Exam:  Head:  Normal cephalic, atraumatic  HEENT:  Neck supple  Fundoscopic Exam:  No signs of disc edema  CVS:  Regular rate and rhythm.  No murmurs  Carotid Examination:  No bruits  Lungs:  Clear to auscultation  Abdomen:  Non-tender, Non-distended  Extremities:  No signs of peripheral edema  Skin:  No rashes     Neurologic Exam:     Mental Status:    -Awake, Alert, Oriented X 3  -No word finding difficulties  -No aphasia  -No dysarthria  -Follows simple and complex commands     CN II:  Visual fields full.  Pupils equally reactive to light  CN III, IV, VI:  Extraocular Muscles full with no signs of nystagmus  CN V:  Facial sensory is symmetric with no asymmetries.  CN VII:  Facial motor symmetric  CN VIII:  Gross hearing intact bilaterally  CN IX:  Palate elevates symmetrically  CN X:  Palate elevates symmetrically  CN XI:  Shoulder shrug symmetric  CN XII:  Tongue is midline on protrusion     Motor: (strength out of 5:  1= minimal movement, 2 = movement in plane of gravity, 3 = movement against gravity, 4 = movement against some resistance, 5 = full strength)     -Right Upper Ext: Proximal: 5 Distal: 5  -Left Upper Ext: Proximal: 5   Distal: 5     -Right Lower Ext: Proximal: 5 Distal:  5  -Left Lower Ext: Proximal: 5   Distal: 5     DTR:  -Right              Bicep: 2+         Tricep: 2+        Brachioradialis: 2+              Patella: 2+       Ankle: 2+         Neg Babinski  -Left              Bicep: 2+         Tricep: 2+        Brachioradialis: 2+              Patella: 2+       Ankle: 2+         Neg Babinski     Sensory:  -Intact to light touch, pinprick, temperature, pain, and proprioception     Coordination:  -Finger to nose intact  -Heel to shin intact  -No ataxia     Gait  -No signs of ataxia  -ambulates unassisted                   Results Review:    I reviewed the patient's new clinical results.            Results from last 7 days   Lab Units 05/11/19  0526 05/10/19  0837 05/10/19  0454   WBC 10*3/mm3 5.46 4.48* 5.42   HEMOGLOBIN g/dL 12.8* 12.7* 12.0*   HEMATOCRIT % 37.4* 36.1* 34.7*   PLATELETS 10*3/mm3 270 253 256         Results from last 7 days   Lab Units 05/11/19  0526 05/10/19  0837 05/09/19  2205   SODIUM mmol/L 134* 134* 132*   POTASSIUM mmol/L 4.7 3.9 3.8   CHLORIDE mmol/L 95* 95* 91*   CO2 mmol/L 31.0 30.0 33.0*   BUN mg/dL 13 10 11   CREATININE mg/dL 1.12 0.98 1.01   CALCIUM mg/dL 9.3 9.4 9.3   BILIRUBIN mg/dL  --   --  0.3   ALK PHOS U/L  --   --  64   ALT (SGPT) U/L  --   --  22   AST (SGOT) U/L  --   --  27   GLUCOSE mg/dL 95 104* 102*               Lab Results   Component Value Date     MG 2.2 10/15/2018     PROTIME 13.0 10/15/2018     INR 0.95 10/15/2018      No components found for: POCGLUC  No components found for: A1C  No results found for: HDL, LDL  No components found for: B12        Lab Results   Component Value Date     TSH 3.230 10/15/2018      MRI brain with without contrast reviewed by me.  There is evidence of a large hypercellular mass in the sphenoid and ethmoid sinuses that extends up into the anterior cranial fossa.  Is very atypical.  The contrast-enhancement is not continuous.  This is a very atypical look for meningioma although this cannot be ruled  out.     CT of sinuses was performed overnight as well and results are as below:  Redemonstration of mass at the anterior cranial fossa which has bony  margins measuring about 3.4 x 3.1 x 2.2 cm (AP by transverse by  craniocaudal). The bony cortex is severely thinned and may be dehiscent  at the anterior inferior aspect such as sagittal series 10 image 49.   Assessment/Plan      Hospital Problem List      Nonintractable headache     Impression:  1.  Headache: Now improved after steroid administration.  We will continue to avoid narcotics.  2.  Frontal mass lesion with extension into the sinuses -I have discussed the case with neurosurgery and will await ENT recommendations as well.  It is my understanding that the differential diagnosis will remain an atypical meningioma with recurrence from the previous surgical site.  Perhaps even a bigger concern is an atypical abscess in the previous surgical bed.  This may or may not be associated with the previous facial infection with lancing and antibiotics that occurred several weeks ago.  Regardless it sounds as if the initial goal will be to provide the patient with broad-spectrum antibiotics while ENT and neurosurgery discussed the case in more detail and decide on the patient's candidacy for further surgical intervention.  3.  History of drug dependence  4.  S/P bilateral meningioma resection  5.  MARIA LUISA on CPAP at home     Plan:  · Decadron 4mg Q6H started on 5/10  · Cefepime 1gm Q8H started on 5/11  · Vancocin 1750mg Q12H started on 5/11  · CPAP stopped as not not push air into the region with the abcess  · Continue Tegretol for seizure prevention and headaches  · Tobacco cessation counseling  · Avoid mind altering medications in accordance with pain contract patient made with [facility]  · Will await further discussions of ENT and Neurosurgery           Moise Crawley MD  05/11/19  11:52 AM

## 2019-05-11 NOTE — PROGRESS NOTES
"Pharmacy Dosing Service  Pharmacokinetics  Vancomycin Initial Evaluation    Assessment/Action/Plan:  Vancomycin initiated this AM at 1750mg IV Q12h for CNS infection. SCr=1.12 (see below). Current vancomycin end date: 5-25-19. No changes at this time. Pharmacy will monitor renal function and adjust dose accordingly.     Subjective:  Chirag Mata is a 43 y.o. male initiated on Vancomycin 1750 mg IV every 12 hours by prescriber for the treatment of CNS infection  .    Objective:  Ht: 188 cm (74\"); Wt: 117 kg (259 lb)  Estimated Creatinine Clearance: 115.6 mL/min (by C-G formula based on SCr of 1.12 mg/dL).   Lab Results   Component Value Date    CREATININE 1.12 05/11/2019    CREATININE 0.98 05/10/2019    CREATININE 1.01 05/09/2019      Lab Results   Component Value Date    WBC 5.46 05/11/2019    WBC 4.48 (L) 05/10/2019    WBC 5.42 05/10/2019      Baseline culture results:  Microbiology Results (last 10 days)       Procedure Component Value - Date/Time    Blood Culture - Blood, Hand, Right [[id number]] Collected:  05/10/19 0844    Lab Status:  Preliminary result Specimen:  Blood from Hand, Right Updated:  05/11/19 0900     Blood Culture No growth at 24 hours    Blood Culture - Blood, Arm, Right [[id number]] Collected:  05/10/19 0837    Lab Status:  Preliminary result Specimen:  Blood from Arm, Right Updated:  05/11/19 0900     Blood Culture No growth at 24 hours            Jose Alfredo Pérez, PharmD  05/11/19 4:51 PM    "

## 2019-05-12 LAB
ANION GAP SERPL CALCULATED.3IONS-SCNC: 8 MMOL/L (ref 4–13)
BASOPHILS # BLD AUTO: 0.01 10*3/MM3 (ref 0–0.2)
BASOPHILS NFR BLD AUTO: 0.2 % (ref 0–2)
BUN BLD-MCNC: 12 MG/DL (ref 5–21)
BUN/CREAT SERPL: 12.8 (ref 7–25)
CALCIUM SPEC-SCNC: 8.8 MG/DL (ref 8.4–10.4)
CHLORIDE SERPL-SCNC: 97 MMOL/L (ref 98–110)
CO2 SERPL-SCNC: 28 MMOL/L (ref 24–31)
CREAT BLD-MCNC: 0.94 MG/DL (ref 0.5–1.4)
DEPRECATED RDW RBC AUTO: 42 FL (ref 40–54)
EOSINOPHIL # BLD AUTO: 0 10*3/MM3 (ref 0–0.7)
EOSINOPHIL NFR BLD AUTO: 0 % (ref 0–4)
ERYTHROCYTE [DISTWIDTH] IN BLOOD BY AUTOMATED COUNT: 12.7 % (ref 12–15)
GFR SERPL CREATININE-BSD FRML MDRD: 106 ML/MIN/1.73
GLUCOSE BLD-MCNC: 103 MG/DL (ref 70–100)
GLUCOSE BLDC GLUCOMTR-MCNC: 103 MG/DL (ref 70–130)
GLUCOSE BLDC GLUCOMTR-MCNC: 168 MG/DL (ref 70–130)
GLUCOSE BLDC GLUCOMTR-MCNC: 51 MG/DL (ref 70–130)
HCT VFR BLD AUTO: 35.5 % (ref 40–52)
HGB BLD-MCNC: 12.5 G/DL (ref 14–18)
IMM GRANULOCYTES # BLD AUTO: 0.02 10*3/MM3 (ref 0–0.05)
IMM GRANULOCYTES NFR BLD AUTO: 0.4 % (ref 0–5)
LYMPHOCYTES # BLD AUTO: 1.1 10*3/MM3 (ref 0.72–4.86)
LYMPHOCYTES NFR BLD AUTO: 20 % (ref 15–45)
MCH RBC QN AUTO: 31.6 PG (ref 28–32)
MCHC RBC AUTO-ENTMCNC: 35.2 G/DL (ref 33–36)
MCV RBC AUTO: 89.9 FL (ref 82–95)
MONOCYTES # BLD AUTO: 0.45 10*3/MM3 (ref 0.19–1.3)
MONOCYTES NFR BLD AUTO: 8.2 % (ref 4–12)
NEUTROPHILS # BLD AUTO: 3.93 10*3/MM3 (ref 1.87–8.4)
NEUTROPHILS NFR BLD AUTO: 71.2 % (ref 39–78)
NRBC BLD AUTO-RTO: 0 /100 WBC (ref 0–0.2)
PLATELET # BLD AUTO: 269 10*3/MM3 (ref 130–400)
PMV BLD AUTO: 8.8 FL (ref 6–12)
POTASSIUM BLD-SCNC: 4.7 MMOL/L (ref 3.5–5.3)
RBC # BLD AUTO: 3.95 10*6/MM3 (ref 4.8–5.9)
SODIUM BLD-SCNC: 133 MMOL/L (ref 135–145)
WBC NRBC COR # BLD: 5.51 10*3/MM3 (ref 4.8–10.8)

## 2019-05-12 PROCEDURE — 80048 BASIC METABOLIC PNL TOTAL CA: CPT | Performed by: PSYCHIATRY & NEUROLOGY

## 2019-05-12 PROCEDURE — 63710000001 DEXAMETHASONE PER 0.25 MG: Performed by: NEUROLOGICAL SURGERY

## 2019-05-12 PROCEDURE — 25010000002 VANCOMYCIN 1 G RECONSTITUTED SOLUTION 1 EACH VIAL: Performed by: NEUROLOGICAL SURGERY

## 2019-05-12 PROCEDURE — 25010000002 KETOROLAC TROMETHAMINE PER 15 MG: Performed by: NEUROLOGICAL SURGERY

## 2019-05-12 PROCEDURE — 82962 GLUCOSE BLOOD TEST: CPT

## 2019-05-12 PROCEDURE — G0378 HOSPITAL OBSERVATION PER HR: HCPCS

## 2019-05-12 PROCEDURE — 25010000002 CEFEPIME PER 500 MG: Performed by: NEUROLOGICAL SURGERY

## 2019-05-12 PROCEDURE — 99232 SBSQ HOSP IP/OBS MODERATE 35: CPT | Performed by: PSYCHIATRY & NEUROLOGY

## 2019-05-12 PROCEDURE — 25010000002 PROCHLORPERAZINE 10 MG/2ML SOLUTION: Performed by: PSYCHIATRY & NEUROLOGY

## 2019-05-12 PROCEDURE — 85025 COMPLETE CBC W/AUTO DIFF WBC: CPT | Performed by: PSYCHIATRY & NEUROLOGY

## 2019-05-12 PROCEDURE — 99232 SBSQ HOSP IP/OBS MODERATE 35: CPT | Performed by: NURSE PRACTITIONER

## 2019-05-12 PROCEDURE — 99224 PR SBSQ OBSERVATION CARE/DAY 15 MINUTES: CPT | Performed by: NEUROLOGICAL SURGERY

## 2019-05-12 RX ADMIN — CEFEPIME HYDROCHLORIDE 1 G: 1 INJECTION, POWDER, FOR SOLUTION INTRAMUSCULAR; INTRAVENOUS at 14:41

## 2019-05-12 RX ADMIN — PROCHLORPERAZINE EDISYLATE 5 MG: 5 INJECTION INTRAMUSCULAR; INTRAVENOUS at 02:51

## 2019-05-12 RX ADMIN — HYDRALAZINE HYDROCHLORIDE 10 MG: 10 TABLET, FILM COATED ORAL at 08:51

## 2019-05-12 RX ADMIN — METRONIDAZOLE 500 MG: 500 INJECTION, SOLUTION INTRAVENOUS at 13:00

## 2019-05-12 RX ADMIN — KETOROLAC TROMETHAMINE 15 MG: 30 INJECTION, SOLUTION INTRAMUSCULAR at 22:02

## 2019-05-12 RX ADMIN — CARBAMAZEPINE 400 MG: 200 TABLET ORAL at 08:50

## 2019-05-12 RX ADMIN — KETOROLAC TROMETHAMINE 15 MG: 30 INJECTION, SOLUTION INTRAMUSCULAR at 02:51

## 2019-05-12 RX ADMIN — METRONIDAZOLE 500 MG: 500 INJECTION, SOLUTION INTRAVENOUS at 22:40

## 2019-05-12 RX ADMIN — CEFEPIME HYDROCHLORIDE 1 G: 1 INJECTION, POWDER, FOR SOLUTION INTRAMUSCULAR; INTRAVENOUS at 06:19

## 2019-05-12 RX ADMIN — DEXAMETHASONE 4 MG: 4 TABLET ORAL at 11:19

## 2019-05-12 RX ADMIN — VANCOMYCIN HYDROCHLORIDE 1750 MG: 1 INJECTION, POWDER, LYOPHILIZED, FOR SOLUTION INTRAVENOUS at 14:39

## 2019-05-12 RX ADMIN — METRONIDAZOLE 500 MG: 500 INJECTION, SOLUTION INTRAVENOUS at 05:25

## 2019-05-12 RX ADMIN — KETOROLAC TROMETHAMINE 15 MG: 30 INJECTION, SOLUTION INTRAMUSCULAR at 11:18

## 2019-05-12 RX ADMIN — PANTOPRAZOLE SODIUM 40 MG: 40 TABLET, DELAYED RELEASE ORAL at 06:18

## 2019-05-12 RX ADMIN — ACETAMINOPHEN 650 MG: 325 TABLET, FILM COATED ORAL at 15:08

## 2019-05-12 RX ADMIN — SODIUM CHLORIDE, PRESERVATIVE FREE 3 ML: 5 INJECTION INTRAVENOUS at 08:50

## 2019-05-12 RX ADMIN — DEXAMETHASONE 4 MG: 4 TABLET ORAL at 06:18

## 2019-05-12 RX ADMIN — HYDROCHLOROTHIAZIDE 25 MG: 25 TABLET ORAL at 08:50

## 2019-05-12 RX ADMIN — DEXAMETHASONE 4 MG: 4 TABLET ORAL at 17:58

## 2019-05-12 RX ADMIN — PROCHLORPERAZINE EDISYLATE 5 MG: 5 INJECTION INTRAMUSCULAR; INTRAVENOUS at 11:18

## 2019-05-12 RX ADMIN — PROCHLORPERAZINE EDISYLATE 5 MG: 5 INJECTION INTRAMUSCULAR; INTRAVENOUS at 22:01

## 2019-05-12 RX ADMIN — VANCOMYCIN HYDROCHLORIDE 1750 MG: 1 INJECTION, POWDER, LYOPHILIZED, FOR SOLUTION INTRAVENOUS at 02:51

## 2019-05-12 NOTE — PLAN OF CARE
Problem: Patient Care Overview  Goal: Plan of Care Review  Outcome: Ongoing (interventions implemented as appropriate)   05/12/19 0602   Coping/Psychosocial   Plan of Care Reviewed With patient;significant other   Plan of Care Review   Progress improving   OTHER   Outcome Summary HA controlled with PRN Toradol and Compazine, no neuro changes, VSS, abx as ordered, will continue to monitor.        Problem: Pain, Acute (Adult)  Goal: Acceptable Pain Control/Comfort Level  Outcome: Ongoing (interventions implemented as appropriate)      Problem: Infection, Risk/Actual (Adult)  Goal: Identify Related Risk Factors and Signs and Symptoms  Outcome: Outcome(s) achieved Date Met: 05/12/19    Goal: Infection Prevention/Resolution  Outcome: Ongoing (interventions implemented as appropriate)

## 2019-05-12 NOTE — PROGRESS NOTES
Neurology Progress Note      Chief Complaint:  Headache    Subjective     Subjective:    No events overnight  Medications:  Current Facility-Administered Medications   Medication Dose Route Frequency Provider Last Rate Last Dose   • acetaminophen (TYLENOL) tablet 650 mg  650 mg Oral Q4H PRN Moise Crawley MD   650 mg at 05/10/19 1552   • amitriptyline (ELAVIL) tablet 50 mg  50 mg Oral Nightly Moise Crawley MD   50 mg at 05/11/19 2253   • carBAMazepine (TEGretol) tablet 400 mg  400 mg Oral BID Moise Crawley MD   400 mg at 05/12/19 0850   • cefepime (MAXIPIME) 1 g/100 mL 0.9% NS IVPB (mbp)  1 g Intravenous Q8H Jose Enrique Anguiano  mL/hr at 05/12/19 0619 1 g at 05/12/19 0619   • dexamethasone (DECADRON) tablet 4 mg  4 mg Oral Q6H Jose Enrique Anguiano MD   4 mg at 05/12/19 0618   • dextrose (D50W) 25 g/ 50mL Intravenous Solution 25 g  25 g Intravenous Q15 Min PRN Jose Enrique Anguiano MD       • dextrose (GLUTOSE) oral gel 15 g  15 g Oral Q15 Min PRN Jose Enrique Anguiano MD       • glucagon (human recombinant) (GLUCAGEN DIAGNOSTIC) injection 1 mg  1 mg Subcutaneous PRN Jose Enrique Anguiano MD       • hydrALAZINE (APRESOLINE) tablet 10 mg  10 mg Oral Daily Moise Crawley MD   10 mg at 05/12/19 0851   • hydrochlorothiazide (HYDRODIURIL) tablet 25 mg  25 mg Oral Daily Moise Crawley MD   25 mg at 05/12/19 0850   • insulin lispro (humaLOG) injection 2-7 Units  2-7 Units Subcutaneous 4x Daily With Meals & Nightly Jose Enrique Anguiano MD       • ketorolac (TORADOL) injection 15 mg  15 mg Intravenous Q6H PRN Jose Enrique Anguiano MD   15 mg at 05/12/19 0251   • lactated ringers infusion  100 mL/hr Intravenous Florencio Lora  mL/hr at 05/10/19 0523 100 mL/hr at 05/10/19 0523   • metroNIDAZOLE (FLAGYL) IVPB 500 mg  500 mg Intravenous Q8H Jos eEnrique Anguiano MD   500 mg at 05/12/19 0525   • ondansetron (ZOFRAN) injection 4 mg  4 mg Intravenous Q6H PRN  Moise Crawley MD       • pantoprazole (PROTONIX) EC tablet 40 mg  40 mg Oral Q AM Jose Enrique Anguiano MD   40 mg at 05/12/19 0618   • prochlorperazine (COMPAZINE) injection 5 mg  5 mg Intravenous Q6H PRN Moise Crawley MD   5 mg at 05/12/19 0251   • sodium chloride 0.9 % flush 10 mL  10 mL Intravenous PRN Abiel Hills MD       • sodium chloride 0.9 % flush 3 mL  3 mL Intravenous Q12H Moise Crawley MD   3 mL at 05/12/19 0850   • sodium chloride 0.9 % flush 3-10 mL  3-10 mL Intravenous PRN Moise Crawley MD   10 mL at 05/11/19 1151   • traMADol (ULTRAM) tablet 50 mg  50 mg Oral Q6H PRN Moise Crawley MD       • vancomycin (VANCOCIN) 1,750 mg in sodium chloride 0.9 % 500 mL IVPB  15 mg/kg Intravenous Q12H Jose Enrique Anguiano MD   1,750 mg at 05/12/19 0251       Review of Systems:   -A 14 point review of systems is completed and is negative except for headache      Objective      Vital Signs  Temp:  [97.4 °F (36.3 °C)-98.6 °F (37 °C)] 97.4 °F (36.3 °C)  Heart Rate:  [80-92] 83  Resp:  [16-18] 18  BP: (135-148)/(80-95) 135/89    Physical Exam:    General Exam:  Head:  Normal cephalic, atraumatic  HEENT:  Neck supple  Fundoscopic Exam:  No signs of disc edema  CVS:  Regular rate and rhythm.  No murmurs  Carotid Examination:  No bruits  Lungs:  Clear to auscultation  Abdomen:  Non-tender, Non-distended  Extremities:  No signs of peripheral edema  Skin:  No rashes    Neurologic Exam:    Mental Status:    -Awake, Alert, Oriented X 3  -No word finding difficulties  -No aphasia  -No dysarthria  -Follows simple and complex commands    CN II:  Visual fields full.  Pupils equally reactive to light  CN III, IV, VI:  Extraocular Muscles full with no signs of nystagmus  CN V:  Facial sensory is symmetric with no asymmetries.  CN VII:  Facial motor symmetric  CN VIII:  Gross hearing intact bilaterally  CN IX:  Palate elevates symmetrically  CN X:  Palate elevates symmetrically  CN XI:  Shoulder  shrug symmetric  CN XII:  Tongue is midline on protrusion    Motor: (strength out of 5:  1= minimal movement, 2 = movement in plane of gravity, 3 = movement against gravity, 4 = movement against some resistance, 5 = full strength)    -Right Upper Ext: Proximal: 5 Distal: 5  -Left Upper Ext: Proximal: 5 Distal: 5    -Right Lower Ext: Proximal: 5 Distal: 5  -Left Lower Ext: Proximal: 5 Distal: 5    DTR:  -Right   Bicep: 2+ Tricep: 2+ Brachioradialis: 2+   Patella: 2+ Ankle: 2+ Neg Babinski  -Left   Bicep: 2+ Tricep: 2+ Brachioradialis: 2+   Patella: 2+ Ankle: 2+ Neg Babinski    Sensory:  -Intact to light touch, pinprick, temperature, pain, and proprioception    Coordination:  -Finger to nose intact  -Heel to shin intact  -No ataxia    Gait  -No signs of ataxia  -ambulates unassisted       Results Review:    I reviewed the patient's new clinical results.    Results from last 7 days   Lab Units 05/12/19  0618 05/11/19  0526 05/10/19  0837   WBC 10*3/mm3 5.51 5.46 4.48*   HEMOGLOBIN g/dL 12.5* 12.8* 12.7*   HEMATOCRIT % 35.5* 37.4* 36.1*   PLATELETS 10*3/mm3 269 270 253        Results from last 7 days   Lab Units 05/12/19  0618 05/11/19  0526 05/10/19  0837 05/09/19  2205   SODIUM mmol/L 133* 134* 134* 132*   POTASSIUM mmol/L 4.7 4.7 3.9 3.8   CHLORIDE mmol/L 97* 95* 95* 91*   CO2 mmol/L 28.0 31.0 30.0 33.0*   BUN mg/dL 12 13 10 11   CREATININE mg/dL 0.94 1.12 0.98 1.01   CALCIUM mg/dL 8.8 9.3 9.4 9.3   BILIRUBIN mg/dL  --   --   --  0.3   ALK PHOS U/L  --   --   --  64   ALT (SGPT) U/L  --   --   --  22   AST (SGOT) U/L  --   --   --  27   GLUCOSE mg/dL 103* 95 104* 102*        Lab Results   Component Value Date    MG 2.2 10/15/2018    PROTIME 13.0 10/15/2018    INR 0.95 10/15/2018     No components found for: POCGLUC  No components found for: A1C  No results found for: HDL, LDL  No components found for: B12  Lab Results   Component Value Date    TSH 3.230 10/15/2018     MRI brain with without contrast reviewed by me.   There is evidence of a large hypercellular mass in the sphenoid and ethmoid sinuses that extends up into the anterior cranial fossa.  Is very atypical.  The contrast-enhancement is not continuous.  This is a very atypical look for meningioma although this cannot be ruled out.    CT of sinuses was performed overnight as well and results are as below:  Redemonstration of mass at the anterior cranial fossa which has bony  margins measuring about 3.4 x 3.1 x 2.2 cm (AP by transverse by  craniocaudal). The bony cortex is severely thinned and may be dehiscent  at the anterior inferior aspect such as sagittal series 10 image 49.   Assessment/Plan     Hospital Problem List      Nonintractable headache    Impression:  1.  Headache: Now improved after steroid administration.  We will continue to avoid narcotics.  2.  Frontal mass lesion with extension into the sinuses -I have discussed the case with neurosurgery and will await ENT recommendations as well.  It is my understanding that the differential diagnosis will remain an atypical meningioma with recurrence from the previous surgical site.  Perhaps even a bigger concern is an atypical abscess in the previous surgical bed.  This may or may not be associated with the previous facial infection with lancing and antibiotics that occurred several weeks ago.  Regardless it sounds as if the initial goal will be to provide the patient with broad-spectrum antibiotics while ENT and neurosurgery discussed the case in more detail and decide on the patient's candidacy for further surgical intervention.  3.  History of drug dependence  4.  S/P bilateral meningioma resection  5.  MARIA LUISA on CPAP at home    Plan:  · Decadron 4mg Q6H started on 5/10  · Cefepime 1gm Q8H started on 5/11  · Vancocin 1750mg Q12H started on 5/11  · CPAP stopped as not not push air into the region with the abcess  · Continue Tegretol for seizure prevention and headaches  · Tobacco cessation counseling  · Avoid mind  altering medications in accordance with pain contract patient made with [facility]  · Discussed with Neurosurgery yesterday.  They have agreed to become the primary attending on the case given that the patient will require surgical intervention moving forward.  Neurology will sign off but I will arrange in the neurology office for CPAP to be discontinued and will also arrange follow-up with Corazon Glasgow.        Moise Crawley MD  05/12/19  10:04 AM

## 2019-05-12 NOTE — PROGRESS NOTES
"Pharmacy Dosing Service  Pharmacokinetics  Vancomycin Follow-up Evaluation    Assessment/Action/Plan:  Vancomycin initiated yesterday for CNS infection. SCr=0.94 (see below). Ordered a Vancomycin trough prior to the 4th dose (03:00 5-13-19). Pharmacy will continue to monitor renal function and adjust dose accordingly.     Subjective:  Chirag Mata is a 43 y.o. male currently on Vancomycin 1750 mg IV every 12 hours for the treatment of CNS infection, day 2 of therapy (Current vancomycin end date: 5-25-19).    Objective:  Ht: 188 cm (74\"); Wt: 117 kg (259 lb)  Estimated Creatinine Clearance: 137.7 mL/min (by C-G formula based on SCr of 0.94 mg/dL).   Lab Results   Component Value Date    CREATININE 0.94 05/12/2019    CREATININE 1.12 05/11/2019    CREATININE 0.98 05/10/2019      Lab Results   Component Value Date    WBC 5.51 05/12/2019    WBC 5.46 05/11/2019    WBC 4.48 (L) 05/10/2019          Culture Results:  Microbiology Results (last 10 days)       Procedure Component Value - Date/Time    Blood Culture - Blood, Hand, Right [[id number]] Collected:  05/10/19 0844    Lab Status:  Preliminary result Specimen:  Blood from Hand, Right Updated:  05/12/19 0900     Blood Culture No growth at 2 days    Blood Culture - Blood, Arm, Right [[id number]] Collected:  05/10/19 0837    Lab Status:  Preliminary result Specimen:  Blood from Arm, Right Updated:  05/12/19 0900     Blood Culture No growth at 2 days          Navneet Pharm D  5:33 PM  5/12/2019  "

## 2019-05-12 NOTE — PROGRESS NOTES
ENT/[facility] ([facility]) Progress Note:      Patient Care Team:  Haider Santacruz PA-C as PCP - General (Physician Assistant)  [facility]    Active consulting complaint: headache    Subjective     Interval History: He reports improvement of his headache.  He now rates this a 4 out of 10.  This has improved since starting antibiotics, steroids, and Toradol. The patient and his significant other report his previous surgery in 2015 was performed by Dr. Abisai Harmon. Per RN, the medical records department at the outside hospital is closed over the weekend and they will attempt to obtain his outside records tomorrow morning.     History taken from: patient family RN    Review of Systems:    Review of Systems   Constitutional: Negative for chills and fever.   HENT: Negative for congestion and postnasal drip.    Respiratory: Negative for cough and shortness of breath.    Cardiovascular: Negative for chest pain.   Gastrointestinal: Negative for diarrhea, nausea and vomiting.   Neurological: Positive for headaches.       Objective     Vital Signs  Temp:  [97.4 °F (36.3 °C)-98.6 °F (37 °C)] 97.4 °F (36.3 °C)  Heart Rate:  [80-92] 83  Resp:  [16-18] 18  BP: (135-148)/(80-95) 135/89    Physical Exam:   Physical Exam   Constitutional: He is oriented to person, place, and time. He appears well-developed and well-nourished. He is cooperative.   HENT:   Head: Normocephalic.   Right Ear: External ear normal.   Left Ear: External ear normal.   Nose: No nasal deformity.   Eyes: Conjunctivae and lids are normal.   Neck: Phonation normal. Neck supple.   Pulmonary/Chest: Effort normal. No respiratory distress.   Neurological: He is alert and oriented to person, place, and time. He has normal strength.   Skin: Skin is warm and dry.   Psychiatric: He has a normal mood and affect. His speech is normal and behavior is normal.        Results Review:       Lab Results (last 24 hours)     Procedure Component Value Units Date/Time    Blood Culture -  Blood, Arm, Right [[id number]] Collected:  05/10/19 0837    Specimen:  Blood from Arm, Right Updated:  05/12/19 0900     Blood Culture No growth at 2 days    Blood Culture - Blood, Hand, Right [[id number]] Collected:  05/10/19 0844    Specimen:  Blood from Hand, Right Updated:  05/12/19 0900     Blood Culture No growth at 2 days    Basic Metabolic Panel [[id number]]  (Abnormal) Collected:  05/12/19 0618    Specimen:  Blood Updated:  05/12/19 0655     Glucose 103 mg/dL      BUN 12 mg/dL      Creatinine 0.94 mg/dL      Sodium 133 mmol/L      Potassium 4.7 mmol/L      Chloride 97 mmol/L      CO2 28.0 mmol/L      Calcium 8.8 mg/dL      eGFR   Amer 106 mL/min/1.73      BUN/Creatinine Ratio 12.8     Anion Gap 8.0 mmol/L     Narrative:       GFR Normal >60  Chronic Kidney Disease <60  Kidney Failure <15    CBC & Differential [[id number]] Collected:  05/12/19 0618    Specimen:  Blood Updated:  05/12/19 0647    Narrative:       The following orders were created for panel order CBC & Differential.  Procedure                               Abnormality         Status                     ---------                               -----------         ------                     CBC Auto Differential[[id number]]        Abnormal            Final result                 Please view results for these tests on the individual orders.    CBC Auto Differential [[id number]]  (Abnormal) Collected:  05/12/19 0618    Specimen:  Blood Updated:  05/12/19 0647     WBC 5.51 10*3/mm3      RBC 3.95 10*6/mm3      Hemoglobin 12.5 g/dL      Hematocrit 35.5 %      MCV 89.9 fL      MCH 31.6 pg      MCHC 35.2 g/dL      RDW 12.7 %      RDW-SD 42.0 fl      MPV 8.8 fL      Platelets 269 10*3/mm3      Neutrophil % 71.2 %      Lymphocyte % 20.0 %      Monocyte % 8.2 %      Eosinophil % 0.0 %      Basophil % 0.2 %      Immature Grans % 0.4 %      Neutrophils, Absolute 3.93 10*3/mm3      Lymphocytes, Absolute 1.10 10*3/mm3      Monocytes, Absolute 0.45 10*3/mm3       Eosinophils, Absolute 0.00 10*3/mm3      Basophils, Absolute 0.01 10*3/mm3      Immature Grans, Absolute 0.02 10*3/mm3      nRBC 0.0 /100 WBC     POC Glucose Once [[id number]]  (Normal) Collected:  05/11/19 2035    Specimen:  Blood Updated:  05/11/19 2056     Glucose 114 mg/dL      Comment: : [id number] Rome BeltranbethMeter ID: [id number]       POC Glucose Once [[id number]]  (Abnormal) Collected:  05/11/19 1656    Specimen:  Blood Updated:  05/11/19 1707     Glucose 157 mg/dL      Comment: : [id number] Elsi [facility] CarolynMeter ID: [id number]       POC Glucose Once [[id number]]  (Abnormal) Collected:  05/11/19 1332    Specimen:  Blood Updated:  05/11/19 1343     Glucose 150 mg/dL      Comment: : [id number] Elsi ([place]) CarolynMeter ID: [id number]       C-reactive Protein [[id number]]  (Abnormal) Collected:  05/11/19 0526    Specimen:  Blood Updated:  05/11/19 1245     C-Reactive Protein 13.53 mg/dL         Imaging Results (last 24 hours)     ** No results found for the last 24 hours. **          Medication Review:     Current Facility-Administered Medications   Medication Dose Route Frequency Provider Last Rate Last Dose   • acetaminophen (TYLENOL) tablet 650 mg  650 mg Oral Q4H PRN Moise Crawley MD   650 mg at 05/10/19 1552   • amitriptyline (ELAVIL) tablet 50 mg  50 mg Oral Nightly Moise Crawley MD   50 mg at 05/11/19 2253   • carBAMazepine (TEGretol) tablet 400 mg  400 mg Oral BID Moise Crawley MD   400 mg at 05/12/19 0850   • cefepime (MAXIPIME) 1 g/100 mL 0.9% NS IVPB (mbp)  1 g Intravenous Q8H Jose Enrique Anguiano  mL/hr at 05/12/19 0619 1 g at 05/12/19 0619   • dexamethasone (DECADRON) tablet 4 mg  4 mg Oral Q6H Jose Enrique Anguiano MD   4 mg at 05/12/19 0618   • dextrose (D50W) 25 g/ 50mL Intravenous Solution 25 g  25 g Intravenous Q15 Min PRN Jose Enrique Anguiano MD       • dextrose (GLUTOSE) oral gel 15 g  15 g Oral Q15 Min PRN Jose Enrique Anguiano MD        • glucagon (human recombinant) (GLUCAGEN DIAGNOSTIC) injection 1 mg  1 mg Subcutaneous PRN Jose Enrique Anguiano MD       • hydrALAZINE (APRESOLINE) tablet 10 mg  10 mg Oral Daily Moise Crawley MD   10 mg at 05/12/19 0851   • hydrochlorothiazide (HYDRODIURIL) tablet 25 mg  25 mg Oral Daily Moise Crawley MD   25 mg at 05/12/19 0850   • insulin lispro (humaLOG) injection 2-7 Units  2-7 Units Subcutaneous 4x Daily With Meals & Nightly Jose Enrique Anguiano MD       • ketorolac (TORADOL) injection 15 mg  15 mg Intravenous Q6H PRN Jose Enrique Anguiano MD   15 mg at 05/12/19 0251   • lactated ringers infusion  100 mL/hr Intravenous Continuous Florencio Spivey  mL/hr at 05/10/19 0523 100 mL/hr at 05/10/19 0523   • metroNIDAZOLE (FLAGYL) IVPB 500 mg  500 mg Intravenous Q8H Jose Enrique Anguiano MD   500 mg at 05/12/19 0525   • ondansetron (ZOFRAN) injection 4 mg  4 mg Intravenous Q6H PRN Moise Crawley MD       • pantoprazole (PROTONIX) EC tablet 40 mg  40 mg Oral Q AM Jose Enrique Anguiano MD   40 mg at 05/12/19 0618   • prochlorperazine (COMPAZINE) injection 5 mg  5 mg Intravenous Q6H PRN Moise Crawley MD   5 mg at 05/12/19 0251   • sodium chloride 0.9 % flush 10 mL  10 mL Intravenous PRN Abiel Hills MD       • sodium chloride 0.9 % flush 3 mL  3 mL Intravenous Q12H Moise Crawley MD   3 mL at 05/12/19 0850   • sodium chloride 0.9 % flush 3-10 mL  3-10 mL Intravenous PRN Moise Crawley MD   10 mL at 05/11/19 1151   • traMADol (ULTRAM) tablet 50 mg  50 mg Oral Q6H PRN Moise Crawley MD       • vancomycin (VANCOCIN) 1,750 mg in sodium chloride 0.9 % 500 mL IVPB  15 mg/kg Intravenous Q12H Jose Enrique Anguiano MD   1,750 mg at 05/12/19 0251       Assessment/Plan       Nonintractable headache      His RN will try to obtain outside medical records tomorrow morning. Continue current treatment plan per Dr. Anguiano. Further surgical planning per Dr. Anguiano  and Dr. Brewer after review of medical records/operative reports.     Renee Archibald, APRN  05/12/19  10:46 AM    Addendum-[facility]:  I have seen and examined the patient.  I discussed his prior treatment and his current symptoms.  I have performed a physical exam and agree with Renee's note.  I have been communicating with Dr. Anguiano regarding the defect of the floor of the anterior cranial fossa.  Currently, we are obtaining records from [facility] to see what prior treatment he has had, and the pathology of that surgery.  We will continue to coordinate between Dr. Anguiano and myself whether this is an issue that can be managed here at [facility], versus referral back to [facility], versus a tertiary center.    Mauro Brewer MD   8:25 AM  05/09/2019

## 2019-05-12 NOTE — PLAN OF CARE
Problem: Patient Care Overview  Goal: Plan of Care Review  Outcome: Ongoing (interventions implemented as appropriate)   05/12/19 1782   Coping/Psychosocial   Plan of Care Reviewed With patient   Plan of Care Review   Progress improving   OTHER   Outcome Summary A&Ox4. H/A controlled with ordered meds. ABX transfused as ordered. L hand/wrist swollen, denies pain, kept elevated when in bed. VSS. Ambulates frequently in hallways. Safety maintained.       Problem: Pain, Acute (Adult)  Goal: Acceptable Pain Control/Comfort Level  Outcome: Ongoing (interventions implemented as appropriate)      Problem: Infection, Risk/Actual (Adult)  Goal: Infection Prevention/Resolution  Outcome: Ongoing (interventions implemented as appropriate)

## 2019-05-13 ENCOUNTER — TELEPHONE (OUTPATIENT)
Dept: NEUROLOGY | Facility: CLINIC | Age: 44
End: 2019-05-13

## 2019-05-13 DIAGNOSIS — G93.89 FRONTAL MASS OF BRAIN: Primary | ICD-10-CM

## 2019-05-13 LAB
ANION GAP SERPL CALCULATED.3IONS-SCNC: 8 MMOL/L (ref 4–13)
BASOPHILS # BLD AUTO: 0.02 10*3/MM3 (ref 0–0.2)
BASOPHILS NFR BLD AUTO: 0.3 % (ref 0–2)
BUN BLD-MCNC: 12 MG/DL (ref 5–21)
BUN/CREAT SERPL: 12.8 (ref 7–25)
CALCIUM SPEC-SCNC: 8.8 MG/DL (ref 8.4–10.4)
CHLORIDE SERPL-SCNC: 98 MMOL/L (ref 98–110)
CO2 SERPL-SCNC: 28 MMOL/L (ref 24–31)
CREAT BLD-MCNC: 0.94 MG/DL (ref 0.5–1.4)
DEPRECATED RDW RBC AUTO: 42 FL (ref 40–54)
EOSINOPHIL # BLD AUTO: 0.02 10*3/MM3 (ref 0–0.7)
EOSINOPHIL NFR BLD AUTO: 0.3 % (ref 0–4)
ERYTHROCYTE [DISTWIDTH] IN BLOOD BY AUTOMATED COUNT: 12.8 % (ref 12–15)
GFR SERPL CREATININE-BSD FRML MDRD: 106 ML/MIN/1.73
GLUCOSE BLD-MCNC: 126 MG/DL (ref 70–100)
GLUCOSE BLDC GLUCOMTR-MCNC: 103 MG/DL (ref 70–130)
GLUCOSE BLDC GLUCOMTR-MCNC: 115 MG/DL (ref 70–130)
GLUCOSE BLDC GLUCOMTR-MCNC: 135 MG/DL (ref 70–130)
GLUCOSE BLDC GLUCOMTR-MCNC: 220 MG/DL (ref 70–130)
HCT VFR BLD AUTO: 34.2 % (ref 40–52)
HGB BLD-MCNC: 12.1 G/DL (ref 14–18)
IMM GRANULOCYTES # BLD AUTO: 0.02 10*3/MM3 (ref 0–0.05)
IMM GRANULOCYTES NFR BLD AUTO: 0.3 % (ref 0–5)
LYMPHOCYTES # BLD AUTO: 1.51 10*3/MM3 (ref 0.72–4.86)
LYMPHOCYTES NFR BLD AUTO: 21.4 % (ref 15–45)
MCH RBC QN AUTO: 31.6 PG (ref 28–32)
MCHC RBC AUTO-ENTMCNC: 35.4 G/DL (ref 33–36)
MCV RBC AUTO: 89.3 FL (ref 82–95)
MONOCYTES # BLD AUTO: 0.45 10*3/MM3 (ref 0.19–1.3)
MONOCYTES NFR BLD AUTO: 6.4 % (ref 4–12)
NEUTROPHILS # BLD AUTO: 5.05 10*3/MM3 (ref 1.87–8.4)
NEUTROPHILS NFR BLD AUTO: 71.3 % (ref 39–78)
NRBC BLD AUTO-RTO: 0 /100 WBC (ref 0–0.2)
PLATELET # BLD AUTO: 277 10*3/MM3 (ref 130–400)
PMV BLD AUTO: 8.8 FL (ref 6–12)
POTASSIUM BLD-SCNC: 4.3 MMOL/L (ref 3.5–5.3)
RBC # BLD AUTO: 3.83 10*6/MM3 (ref 4.8–5.9)
SODIUM BLD-SCNC: 134 MMOL/L (ref 135–145)
VANCOMYCIN TROUGH SERPL-MCNC: 9.64 MCG/ML (ref 10–20)
VANCOMYCIN TROUGH SERPL-MCNC: 9.7 MCG/ML (ref 10–20)
WBC NRBC COR # BLD: 7.07 10*3/MM3 (ref 4.8–10.8)

## 2019-05-13 PROCEDURE — 25010000002 CEFEPIME PER 500 MG: Performed by: NEUROLOGICAL SURGERY

## 2019-05-13 PROCEDURE — 25010000002 KETOROLAC TROMETHAMINE PER 15 MG: Performed by: NEUROLOGICAL SURGERY

## 2019-05-13 PROCEDURE — 80202 ASSAY OF VANCOMYCIN: CPT | Performed by: NEUROLOGICAL SURGERY

## 2019-05-13 PROCEDURE — 25010000002 VANCOMYCIN 10 G RECONSTITUTED SOLUTION: Performed by: NEUROLOGICAL SURGERY

## 2019-05-13 PROCEDURE — 25010000002 PROCHLORPERAZINE 10 MG/2ML SOLUTION: Performed by: PSYCHIATRY & NEUROLOGY

## 2019-05-13 PROCEDURE — 63710000001 DEXAMETHASONE PER 0.25 MG: Performed by: NEUROLOGICAL SURGERY

## 2019-05-13 PROCEDURE — G0378 HOSPITAL OBSERVATION PER HR: HCPCS

## 2019-05-13 PROCEDURE — 82962 GLUCOSE BLOOD TEST: CPT

## 2019-05-13 PROCEDURE — 99231 SBSQ HOSP IP/OBS SF/LOW 25: CPT | Performed by: NURSE PRACTITIONER

## 2019-05-13 PROCEDURE — 80048 BASIC METABOLIC PNL TOTAL CA: CPT | Performed by: PSYCHIATRY & NEUROLOGY

## 2019-05-13 PROCEDURE — 85025 COMPLETE CBC W/AUTO DIFF WBC: CPT | Performed by: PSYCHIATRY & NEUROLOGY

## 2019-05-13 RX ORDER — AMOXICILLIN AND CLAVULANATE POTASSIUM 600; 42.9 MG/5ML; MG/5ML
1200 POWDER, FOR SUSPENSION ORAL EVERY 8 HOURS
Status: DISCONTINUED | OUTPATIENT
Start: 2019-05-13 | End: 2019-05-14 | Stop reason: HOSPADM

## 2019-05-13 RX ADMIN — AMOXICILLIN AND CLAVULANATE POTASSIUM 1200 MG: 600; 42.9 SUSPENSION ORAL at 23:07

## 2019-05-13 RX ADMIN — CEFEPIME HYDROCHLORIDE 1 G: 1 INJECTION, POWDER, FOR SOLUTION INTRAMUSCULAR; INTRAVENOUS at 00:08

## 2019-05-13 RX ADMIN — CEFEPIME HYDROCHLORIDE 1 G: 1 INJECTION, POWDER, FOR SOLUTION INTRAMUSCULAR; INTRAVENOUS at 20:49

## 2019-05-13 RX ADMIN — DEXAMETHASONE 4 MG: 4 TABLET ORAL at 00:08

## 2019-05-13 RX ADMIN — METRONIDAZOLE 500 MG: 500 INJECTION, SOLUTION INTRAVENOUS at 05:19

## 2019-05-13 RX ADMIN — VANCOMYCIN HYDROCHLORIDE 1250 MG: 10 INJECTION, POWDER, LYOPHILIZED, FOR SOLUTION INTRAVENOUS at 03:15

## 2019-05-13 RX ADMIN — CARBAMAZEPINE 400 MG: 200 TABLET ORAL at 00:08

## 2019-05-13 RX ADMIN — CARBAMAZEPINE 400 MG: 200 TABLET ORAL at 21:01

## 2019-05-13 RX ADMIN — HYDRALAZINE HYDROCHLORIDE 10 MG: 10 TABLET, FILM COATED ORAL at 08:41

## 2019-05-13 RX ADMIN — KETOROLAC TROMETHAMINE 15 MG: 30 INJECTION, SOLUTION INTRAMUSCULAR at 08:42

## 2019-05-13 RX ADMIN — CARBAMAZEPINE 400 MG: 200 TABLET ORAL at 08:41

## 2019-05-13 RX ADMIN — SODIUM CHLORIDE, PRESERVATIVE FREE 3 ML: 5 INJECTION INTRAVENOUS at 20:52

## 2019-05-13 RX ADMIN — METRONIDAZOLE 500 MG: 500 INJECTION, SOLUTION INTRAVENOUS at 18:58

## 2019-05-13 RX ADMIN — AMITRIPTYLINE HYDROCHLORIDE 50 MG: 25 TABLET, FILM COATED ORAL at 21:02

## 2019-05-13 RX ADMIN — KETOROLAC TROMETHAMINE 15 MG: 30 INJECTION, SOLUTION INTRAMUSCULAR at 23:13

## 2019-05-13 RX ADMIN — DEXAMETHASONE 4 MG: 4 TABLET ORAL at 17:46

## 2019-05-13 RX ADMIN — PROCHLORPERAZINE EDISYLATE 5 MG: 5 INJECTION INTRAMUSCULAR; INTRAVENOUS at 17:45

## 2019-05-13 RX ADMIN — AMITRIPTYLINE HYDROCHLORIDE 50 MG: 25 TABLET, FILM COATED ORAL at 00:08

## 2019-05-13 RX ADMIN — PROCHLORPERAZINE EDISYLATE 5 MG: 5 INJECTION INTRAMUSCULAR; INTRAVENOUS at 08:42

## 2019-05-13 RX ADMIN — SODIUM CHLORIDE, PRESERVATIVE FREE 3 ML: 5 INJECTION INTRAVENOUS at 08:47

## 2019-05-13 RX ADMIN — DEXAMETHASONE 4 MG: 4 TABLET ORAL at 05:18

## 2019-05-13 RX ADMIN — KETOROLAC TROMETHAMINE 15 MG: 30 INJECTION, SOLUTION INTRAMUSCULAR at 16:51

## 2019-05-13 RX ADMIN — ACETAMINOPHEN 650 MG: 325 TABLET, FILM COATED ORAL at 12:41

## 2019-05-13 RX ADMIN — HYDROCHLOROTHIAZIDE 25 MG: 25 TABLET ORAL at 08:41

## 2019-05-13 RX ADMIN — PANTOPRAZOLE SODIUM 40 MG: 40 TABLET, DELAYED RELEASE ORAL at 05:18

## 2019-05-13 RX ADMIN — CEFEPIME HYDROCHLORIDE 1 G: 1 INJECTION, POWDER, FOR SOLUTION INTRAMUSCULAR; INTRAVENOUS at 07:19

## 2019-05-13 RX ADMIN — DEXAMETHASONE 4 MG: 4 TABLET ORAL at 12:05

## 2019-05-13 NOTE — PLAN OF CARE
Problem: Patient Care Overview  Goal: Plan of Care Review  Outcome: Ongoing (interventions implemented as appropriate)   05/13/19 3907   Coping/Psychosocial   Plan of Care Reviewed With patient   Plan of Care Review   Progress improving   OTHER   Outcome Summary VSS. Pt c/o headache, treated with prn meds. pt up ad michelle. IV abx not infused, pt lost IV access, awaiting call from Efren yao to determine if IV needs reinserted or if he can be switched to PO. Awaiting ID consult. Saftey maintained, will continue to monitor.      Goal: Individualization and Mutuality  Outcome: Ongoing (interventions implemented as appropriate)    Goal: Discharge Needs Assessment  Outcome: Ongoing (interventions implemented as appropriate)    Goal: Interprofessional Rounds/Family Conf  Outcome: Ongoing (interventions implemented as appropriate)      Problem: Pain, Acute (Adult)  Goal: Acceptable Pain Control/Comfort Level  Outcome: Ongoing (interventions implemented as appropriate)      Problem: Infection, Risk/Actual (Adult)  Goal: Infection Prevention/Resolution  Outcome: Ongoing (interventions implemented as appropriate)

## 2019-05-13 NOTE — TELEPHONE ENCOUNTER
----- Message from ROBERT Powell sent at 5/13/2019  8:20 AM CDT -----   All,    I have d/c CPAP.    Sara, Can you let legacy know?  Also please cancel upcoming appointments Maida marcus  ----- Message -----  From: Moise Crawley MD  Sent: 5/12/2019  10:06 AM  To: ROBERT Powell    Please D/C his CPAP permenantly    Thanks,    Dr. GREEN

## 2019-05-13 NOTE — PLAN OF CARE
Problem: Patient Care Overview  Goal: Plan of Care Review  Outcome: Ongoing (interventions implemented as appropriate)   05/13/19 7690   Coping/Psychosocial   Plan of Care Reviewed With patient;spouse   Plan of Care Review   Progress improving   OTHER   Outcome Summary Required 1 dose of Toradol and Compazine with relief, no neuro changes, periorbital swelling noted, rested well, abx given as ordered, VSS.        Problem: Pain, Acute (Adult)  Goal: Acceptable Pain Control/Comfort Level  Outcome: Ongoing (interventions implemented as appropriate)      Problem: Infection, Risk/Actual (Adult)  Goal: Infection Prevention/Resolution  Outcome: Ongoing (interventions implemented as appropriate)         Medical Necessity Statement: Based on my medical judgement, Mohs surgery is the most appropriate treatment for this cancer compared to other treatments.

## 2019-05-13 NOTE — PROGRESS NOTES
"Neurosurgery Daily Progress Note    Assessment:   Chirag Mata is a 43 y.o. male with a significant comorbidity bifrontal craniotomy resection in 2015 by Abisai Harmon and seizures.  He presents with a new problem of intractable headache. Physical exam findings of neurologically intact.  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    DDX:     Nonintractable headache    Patient Active Problem List   Diagnosis   • Seizure (CMS/HCC)   • History of resection of meningioma   • Chronic intractable headache   • MARIA LUISA on CPAP   • Nonintractable headache     Plan:   Neuro: Stable, intact.  No headache at present   DDX: abscess vs mucocele vs encephalocele vs recurrent tumor.   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  Combined approach with ENT   Cont Abx Cef/Vanc/Flagyl.   Cont Decadron   Toradol for HA  CV: JUICE  Pulm: JUICE, no CPAP  :  JUICE  FEN: Reg Diet  ENDO: JUICE  GI: JUICE  ID: Consult ID for suppressive Abx for 2 weeks prior to surgery  Heme:  DVT prophylaxis  Pain: Well controlled on toradol and decadron    Dispo: Anticipate DC back to [facility] on Monday.   DC pending ID recommendations.    Chief complaint:   HA    Subjective  Doing well.  HA improving    Temp:  [97.9 °F (36.6 °C)-98 °F (36.7 °C)] 98 °F (36.7 °C)  Heart Rate:  [80-96] 96  Resp:  [18] 18  BP: (132-142)/(88-94) 133/94    Objective:  Vital signs: (most recent): Blood pressure 133/94, pulse 96, temperature 98 °F (36.7 °C), temperature source Oral, resp. rate 18, height 188 cm (74\"), weight 117 kg (259 lb), SpO2 99 %.      Neurologic Exam     Mental Status   Oriented to person, place, and time.   Speech: speech is normal   Level of consciousness: alert    Cranial Nerves     CN III, IV, VI   Pupils are equal, round, and reactive to light.  Extraocular motions are normal.     CN V   Facial sensation intact.     CN VII   Facial expression " full, symmetric.     Motor Exam   Right arm pronator drift: absent  Left arm pronator drift: absent    Strength   Right deltoid: 5/5  Left deltoid: 5/5  Right biceps: 5/5  Left biceps: 5/5  Right triceps: 5/5  Left triceps: 5/5  Right iliopsoas: 5/5  Left iliopsoas: 5/5  Right quadriceps: 5/5  Left quadriceps: 5/5  Right gastroc: 5/5  Left gastroc: 5/5    Sensory Exam   Light touch normal.     Drains:  NA    Imaging Results (last 24 hours)     ** No results found for the last 24 hours. **        Lab Results (last 24 hours)     Procedure Component Value Units Date/Time    Vancomycin, Trough [[id number]]  (Abnormal) Collected:  05/13/19 1134    Specimen:  Blood Updated:  05/13/19 1152     Vancomycin Trough 9.70 mcg/mL     POC Glucose Once [[id number]]  (Normal) Collected:  05/13/19 1120    Specimen:  Blood Updated:  05/13/19 1131     Glucose 115 mg/dL      Comment: : [id number] Pranay DenisePure Digital TechnologiesMeter ID: [id number]       Blood Culture - Blood, Arm, Right [[id number]] Collected:  05/10/19 0837    Specimen:  Blood from Arm, Right Updated:  05/13/19 0900     Blood Culture No growth at 3 days    Blood Culture - Blood, Hand, Right [[id number]] Collected:  05/10/19 0844    Specimen:  Blood from Hand, Right Updated:  05/13/19 0900     Blood Culture No growth at 3 days    POC Glucose Once [[id number]]  (Abnormal) Collected:  05/13/19 0840    Specimen:  Blood Updated:  05/13/19 0852     Glucose 220 mg/dL      Comment: : [id number] MeterHeroMeter ID: [id number]       Vancomycin, Trough Please call results to Pharmacy prior to administering next dose [[id number]]  (Abnormal) Collected:  05/13/19 0219    Specimen:  Blood Updated:  05/13/19 0241     Vancomycin Trough 9.64 mcg/mL     Basic Metabolic Panel [[id number]]  (Abnormal) Collected:  05/13/19 0219    Specimen:  Blood Updated:  05/13/19 0236     Glucose 126 mg/dL      BUN 12 mg/dL      Creatinine 0.94 mg/dL      Sodium 134 mmol/L      Potassium 4.3 mmol/L      Chloride  98 mmol/L      CO2 28.0 mmol/L      Calcium 8.8 mg/dL      eGFR   Amer 106 mL/min/1.73      BUN/Creatinine Ratio 12.8     Anion Gap 8.0 mmol/L     Narrative:       GFR Normal >60  Chronic Kidney Disease <60  Kidney Failure <15    CBC & Differential [[id number]] Collected:  05/13/19 0219    Specimen:  Blood Updated:  05/13/19 0226    Narrative:       The following orders were created for panel order CBC & Differential.  Procedure                               Abnormality         Status                     ---------                               -----------         ------                     CBC Auto Differential[[id number]]        Abnormal            Final result                 Please view results for these tests on the individual orders.    CBC Auto Differential [[id number]]  (Abnormal) Collected:  05/13/19 0219    Specimen:  Blood Updated:  05/13/19 0226     WBC 7.07 10*3/mm3      RBC 3.83 10*6/mm3      Hemoglobin 12.1 g/dL      Hematocrit 34.2 %      MCV 89.3 fL      MCH 31.6 pg      MCHC 35.4 g/dL      RDW 12.8 %      RDW-SD 42.0 fl      MPV 8.8 fL      Platelets 277 10*3/mm3      Neutrophil % 71.3 %      Lymphocyte % 21.4 %      Monocyte % 6.4 %      Eosinophil % 0.3 %      Basophil % 0.3 %      Immature Grans % 0.3 %      Neutrophils, Absolute 5.05 10*3/mm3      Lymphocytes, Absolute 1.51 10*3/mm3      Monocytes, Absolute 0.45 10*3/mm3      Eosinophils, Absolute 0.02 10*3/mm3      Basophils, Absolute 0.02 10*3/mm3      Immature Grans, Absolute 0.02 10*3/mm3      nRBC 0.0 /100 WBC     POC Glucose Once [[id number]]  (Abnormal) Collected:  05/12/19 2115    Specimen:  Blood Updated:  05/12/19 2127     Glucose 51 mg/dL      Comment: : [id number] [place] AaronMeter ID: [id number]       POC Glucose Once [[id number]]  (Abnormal) Collected:  05/12/19 1715    Specimen:  Blood Updated:  05/12/19 1726     Glucose 168 mg/dL      Comment: : [id number] Elsi Joseph (Wynn)ynMeter ID: [id number]              [id number]  Efren Joseph, APRN

## 2019-05-13 NOTE — PROGRESS NOTES
Continued Stay Note  [facility]     Patient Name: Chirag Mata  MRN: [id number]  Today's Date: 5/13/2019    Admit Date: 5/9/2019    Discharge Plan     Row Name 05/13/19 1534       Plan    Plan Comments  ATTEMPTED TO SEE BUT PT IS NOT IN ROOM. WILL TRY AGAIN TOMORROW.         Discharge Codes    No documentation.             LEELEE Worley

## 2019-05-13 NOTE — PROGRESS NOTES
Neurosurgery Daily Progress Note    Assessment:   Chirag Mata is a 43 y.o. male with a significant comorbidity bifrontal craniotomy resection in 2015 by Abisai Harmon and seizures.  He presents with a new problem of intractable headache. Physical exam findings of neurologically intact.  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    DDX:     Nonintractable headache    Patient Active Problem List   Diagnosis   • Seizure (CMS/HCC)   • History of resection of meningioma   • Chronic intractable headache   • MARIA LUISA on CPAP   • Nonintractable headache       Plan:   Neuro: Stable, intact   DDX: abscess vs mucocele vs encephalocele vs recurrent tumor.   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  Combined approach with ENT   Cont Abx Cef/Vanc/Flagyl.   Cont Decadron   Toradol for HA  CV: JUICE  Pulm: JUICE, no CPAP  :  JUICE  FEN: Reg Diet  ENDO: JUICE  GI: JUICE  ID: Consult ID for suppressive Abx for 2 weeks prior to surgery  Heme:  DVT prophylaxis  Pain: Well controlle don toradol and decadron  Dispo: Anticipate DC back to [facility] on Monday.    Chief complaint:   HA    Subjective  Doing well.  HA improving    Temp:  [97.4 °F (36.3 °C)-98.6 °F (37 °C)] 97.9 °F (36.6 °C)  Heart Rate:  [80-92] 80  Resp:  [18] 18  BP: (132-148)/(89-95) 132/92    Objective    Neurologic Exam     Mental Status   Oriented to person, place, and time.   Speech: speech is normal   Level of consciousness: alert    Cranial Nerves     CN III, IV, VI   Pupils are equal, round, and reactive to light.  Extraocular motions are normal.     CN V   Facial sensation intact.     CN VII   Facial expression full, symmetric.     Motor Exam   Right arm pronator drift: absent  Left arm pronator drift: absent    Strength   Right deltoid: 5/5  Left deltoid: 5/5  Right biceps: 5/5  Left biceps: 5/5  Right triceps: 5/5  Left triceps: 5/5  Right iliopsoas:  5/5  Left iliopsoas: 5/5  Right quadriceps: 5/5  Left quadriceps: 5/5  Right gastroc: 5/5  Left gastroc: 5/5    Sensory Exam   Light touch normal.       Drains:      Imaging Results (last 24 hours)     ** No results found for the last 24 hours. **        Lab Results (last 24 hours)     Procedure Component Value Units Date/Time    POC Glucose Once [[id number]]  (Abnormal) Collected:  05/12/19 1715    Specimen:  Blood Updated:  05/12/19 1726     Glucose 168 mg/dL      Comment: : [id number] Elsi BA SystemsErik) CarolynMeter ID: [id number]       POC Glucose Once [[id number]]  (Normal) Collected:  05/12/19 1125    Specimen:  Blood Updated:  05/12/19 1136     Glucose 103 mg/dL      Comment: : [id number] [employer]rik) CarolynMeter ID: [id number]       Blood Culture - Blood, Arm, Right [[id number]] Collected:  05/10/19 0837    Specimen:  Blood from Arm, Right Updated:  05/12/19 0900     Blood Culture No growth at 2 days    Blood Culture - Blood, Hand, Right [[id number]] Collected:  05/10/19 0844    Specimen:  Blood from Hand, Right Updated:  05/12/19 0900     Blood Culture No growth at 2 days    Basic Metabolic Panel [[id number]]  (Abnormal) Collected:  05/12/19 0618    Specimen:  Blood Updated:  05/12/19 0655     Glucose 103 mg/dL      BUN 12 mg/dL      Creatinine 0.94 mg/dL      Sodium 133 mmol/L      Potassium 4.7 mmol/L      Chloride 97 mmol/L      CO2 28.0 mmol/L      Calcium 8.8 mg/dL      eGFR   Amer 106 mL/min/1.73      BUN/Creatinine Ratio 12.8     Anion Gap 8.0 mmol/L     Narrative:       GFR Normal >60  Chronic Kidney Disease <60  Kidney Failure <15    CBC & Differential [[id number]] Collected:  05/12/19 0618    Specimen:  Blood Updated:  05/12/19 0647    Narrative:       The following orders were created for panel order CBC & Differential.  Procedure                               Abnormality         Status                     ---------                               -----------         ------                      CBC Auto Differential[[id number]]        Abnormal            Final result                 Please view results for these tests on the individual orders.    CBC Auto Differential [[id number]]  (Abnormal) Collected:  05/12/19 0618    Specimen:  Blood Updated:  05/12/19 0647     WBC 5.51 10*3/mm3      RBC 3.95 10*6/mm3      Hemoglobin 12.5 g/dL      Hematocrit 35.5 %      MCV 89.9 fL      MCH 31.6 pg      MCHC 35.2 g/dL      RDW 12.7 %      RDW-SD 42.0 fl      MPV 8.8 fL      Platelets 269 10*3/mm3      Neutrophil % 71.2 %      Lymphocyte % 20.0 %      Monocyte % 8.2 %      Eosinophil % 0.0 %      Basophil % 0.2 %      Immature Grans % 0.4 %      Neutrophils, Absolute 3.93 10*3/mm3      Lymphocytes, Absolute 1.10 10*3/mm3      Monocytes, Absolute 0.45 10*3/mm3      Eosinophils, Absolute 0.00 10*3/mm3      Basophils, Absolute 0.01 10*3/mm3      Immature Grans, Absolute 0.02 10*3/mm3      nRBC 0.0 /100 WBC     POC Glucose Once [[id number]]  (Normal) Collected:  05/11/19 2035    Specimen:  Blood Updated:  05/11/19 2056     Glucose 114 mg/dL      Comment: : [id number] [place] AaronMeter ID: [id number]             [id number]  Jose Enrique Anguiano MD

## 2019-05-13 NOTE — PROGRESS NOTES
"Pharmacy Dosing Service  Pharmacokinetics  Vancomycin Follow-up Evaluation    Assessment/Action/Plan:  Patient is currently receiving Vancomycin 1250 mg IV every 8 hours for the treatment of CNS Infection, day 3 of therapy. Dose adjusted from 1750 mg IV every 12 due to subtherapeutic trough of 9.64. Current vancomycin end date: 5/25/19. Labs/dose reviewed. Will order a trough level tomorrow, 5/14/19, at 10:00 (prior to 5th dose). Pharmacy will continue to monitor renal function and adjust dose accordingly.     Subjective:  Chirag Mata is a 43 y.o. male    Objective:  Ht: 188 cm (74\"); Wt: 117 kg (259 lb)  Estimated Creatinine Clearance: 137.7 mL/min (by C-G formula based on SCr of 0.94 mg/dL).   Lab Results   Component Value Date    CREATININE 0.94 05/13/2019    CREATININE 0.94 05/12/2019    CREATININE 1.12 05/11/2019      Lab Results   Component Value Date    WBC 7.07 05/13/2019    WBC 5.51 05/12/2019    WBC 5.46 05/11/2019         Lab Results   Component Value Date    VANCOTROUGH 9.64 (L) 05/13/2019       Culture Results:  Microbiology Results (last 10 days)       Procedure Component Value - Date/Time    Blood Culture - Blood, Hand, Right [[id number]] Collected:  05/10/19 0844    Lab Status:  Preliminary result Specimen:  Blood from Hand, Right Updated:  05/12/19 0900     Blood Culture No growth at 2 days    Blood Culture - Blood, Arm, Right [[id number]] Collected:  05/10/19 0837    Lab Status:  Preliminary result Specimen:  Blood from Arm, Right Updated:  05/12/19 0900     Blood Culture No growth at 2 days          Antimicrobials:  Anti-Infectives (From admission, onward)      Ordered     Dose/Rate Route Frequency Start Stop    05/13/19 0247  vancomycin 1250 mg/250 mL 0.9% NS IVPB ([facility])     Ordering Provider:  Jose Enrique Anguiano MD    1,250 mg  over 90 Minutes Intravenous Every 8 Hours 05/13/19 0300 05/25/19 1859    05/11/19 1041  metroNIDAZOLE (FLAGYL) IVPB 500 mg     Ordering Provider:  Silvio, " Jose Enrique Ballard MD    500 mg  over 60 Minutes Intravenous Every 8 Hours 05/11/19 1230 05/25/19 1229    05/11/19 1041  cefepime (MAXIPIME) 1 g/100 mL 0.9% NS IVPB ([facility])     Ordering Provider:  Jose Enrique Anguiano MD    1 g  200 mL/hr over 30 Minutes Intravenous Every 8 Hours 05/11/19 1130 05/25/19 1459            Grady Law PharmD   05/13/19 8:12 AM

## 2019-05-13 NOTE — CONSULTS
"INFECTIOUS DISEASES CONSULT NOTE    Patient:  Chirag Mata 43 y.o. male  ROOM # 329/1  YOB: 1975  MRN: [id number]  [facility]:  [id number]  Admit date: 5/9/2019   Admitting Physician: Jose Enrique Anguiano, *  Primary Care Physician: Haider Santacruz PA-C  REFERRING PROVIDER: Moise Crawley MD    Reason for Consultation: \"Suppressive antibiotics for 2 weeks until surgical removal\"    History of Present Illness/Chief Complaint: Pleasant 43-year-old man.  He indicates he was doing fine until 8 days ago.  It sounds like gradually over 1 to 2 days he developed headache.  He describes it as a hurting all over.  He indicates it was like his head was being squeezed.  His discomfort was worse in the region of the left ear more than the right.  He did not notice any visual changes or other localizing neurological symptoms.  He indicates he is currently at [facility] undergoing rehab for prescription opiate medication usage.  He was seen in medical department at [facility].  Indicates Mucinex, nonsteroidal anti-inflammatory agents, Clarinex and nasal steroids were not helping.  Indicates symptoms became severe enough that he presented on May 9 and was admitted for further management.  He indicates he is feeling better now.  He attributes the improvement to Toradol, Compazine, and steroid.  He indicates fever, chills, night sweats, adenopathy or other localizing signs of infection have not been present.  He indicates appetite has been fine and he has gained weight while at [facility].    Current Scheduled Medications:     amitriptyline 50 mg Oral Nightly   carBAMazepine 400 mg Oral BID   cefepime 1 g Intravenous Q8H   dexamethasone 4 mg Oral Q6H   hydrALAZINE 10 mg Oral Daily   hydrochlorothiazide 25 mg Oral Daily   insulin lispro 2-7 Units Subcutaneous 4x Daily With Meals & Nightly   metroNIDAZOLE 500 mg Intravenous Q8H   pantoprazole 40 mg Oral Q AM   sodium chloride 3 mL Intravenous Q12H   vancomycin " "1,250 mg Intravenous Q8H     Current PRN Medications:  •  acetaminophen  •  dextrose  •  dextrose  •  glucagon (human recombinant)  •  ketorolac  •  ondansetron  •  prochlorperazine  •  sodium chloride  •  sodium chloride  •  traMADol    Allergies:    Allergies   Allergen Reactions   • Bactrim [Sulfamethoxazole-Trimethoprim] Other (See Comments)     SHAKES VOMITTING       Past Medical History: History seizure disorder.  History of bilateral frontal meningiomas.    Past Surgical History: Meningioma resection in 2015.  He indicates he had tympanostomy tubes placed when he was a young child.  He indicates prior issues with opiate pain medication.  Hypertension.    Social History: Single.  He indicates he and his girlfriend had been living with his girlfriend's mother.  He is currently at [facility].  He was smoking 1 pack of tobacco every 2 days prior to admit to [facility].  Now 1 pack of tobacco will last 3 days.  He reports no alcohol use since arrival at [facility].  Reports no illicit drug use.    Family History: Family history of pancreatic cancer.    Exposure History: No close contacts have been ill.  No recent travel.  Exposure to a dog at his girlfriend's mother's home.    Review of Systems  No visual loss, visual changes or visual blurring  No sore throat or pharyngitis  No nausea or vomiting  No chest pain or chest pressure  No cough or sputum production  No dysuria, hematuria or urinary frequency  No recent skin rash or boils  No depression or anxiety  Please see HPI for remaining pertinent positives negatives from his complete review of systems    Vital Signs:  /94   Pulse 96   Temp 98.4 °F (36.9 °C) (Oral)   Resp 18   Ht 188 cm (74\")   Wt 117 kg (259 lb)   SpO2 99%   BMI 33.25 kg/m²  Temp (24hrs), Av.1 °F (36.7 °C), Min:97.9 °F (36.6 °C), Max:98.4 °F (36.9 °C)    Physical Exam  Vital signs - reviewed.  Line/IV site - No erythema or tenderness.  Sclera nonicteric.  No conjunctival " hemorrhages.  No cervical, supraclavicular, axillary, or inguinal adenopathy.  Neck is supple without meningismus  No frontal or mastoid area tenderness  Lungs clear to auscultation without crackles  Heart regular rhythm without murmur, S3 or S4  Abdomen is soft and nontender without hepatosplenomegaly or mass  Cranial nerves II through XII intact.  Sensory exam intact to touch in upper and lower extremities.   strength is symmetrical.  Gait normal.  No Romberg sign.    Lab Results:  CBC: Results from last 7 days   Lab Units 05/13/19  0219 05/12/19  0618 05/11/19  0526 05/10/19  0837 05/10/19  0454 05/09/19  2205   WBC 10*3/mm3 7.07 5.51 5.46 4.48* 5.42 5.79   HEMOGLOBIN g/dL 12.1* 12.5* 12.8* 12.7* 12.0* 12.4*   HEMATOCRIT % 34.2* 35.5* 37.4* 36.1* 34.7* 35.4*   PLATELETS 10*3/mm3 277 269 270 253 256 244   LYMPHOCYTE % %  --   --   --  14.4* 32.0 22.0   MONOCYTES % %  --   --   --  7.2 5.0 1.0*     CMP: Results from last 7 days   Lab Units 05/13/19  0219 05/12/19  0618 05/11/19  0526 05/10/19  0837 05/09/19  2205   SODIUM mmol/L 134* 133* 134* 134* 132*   POTASSIUM mmol/L 4.3 4.7 4.7 3.9 3.8   CHLORIDE mmol/L 98 97* 95* 95* 91*   CO2 mmol/L 28.0 28.0 31.0 30.0 33.0*   BUN mg/dL 12 12 13 10 11   CREATININE mg/dL 0.94 0.94 1.12 0.98 1.01   CALCIUM mg/dL 8.8 8.8 9.3 9.4 9.3   BILIRUBIN mg/dL  --   --   --   --  0.3   ALK PHOS U/L  --   --   --   --  64   ALT (SGPT) U/L  --   --   --   --  22   AST (SGOT) U/L  --   --   --   --  27   GLUCOSE mg/dL 126* 103* 95 104* 102*     Radiology:   CT sinuses without contrast May 10, 2019:  IMPRESSION:  1. Redemonstration of anterior cranial fossa mass with anatomic  considerations described in detail above.   2. Bifrontal hypodensity with ex vacuo dilation of the anterior horns of  the lateral ventricles, likely encephalomalacia.  This report was finalized on 05/11/2019 00:36 by Dr Sonya Comer MD.    MRI brain and brainstem May 10, 2019 without and with  contrast:  IMPRESSION:  1. Large hypercellular mass in the sphenoid and ethmoid sinuses  extending superiorly through to the floor of the anterior cranial fossa.  Imaging characteristics suggest a hypercellular neoplasm, recurrent  meningioma considered.  2. Postsurgical changes frontal lobes.  3. Cerebellar tonsillar ectopia slightly more conspicuous compared to  the previous study. These findings suggest the possibility of increased  intracranial pressure.  This report was finalized on 05/10/2019 16:17 by Dr. Nabil Blanchard MD.    Additional Studies Reviewed:     Impression:   1.  Hypercellular mass in sphenoid and ethmoid sinuses.  Per chart review differential diagnosis including recurrent tumor, mucocele, or ethmoid infection.  He is stable at present.  He has not had fever.  Per chart review plan for coordinated surgery between neurosurgery and ENT in 2 weeks.  Asked to provide recommendations for oral suppressive antibiotics prior to surgery.  Augmentin would provide excellent broad coverage for common sinus pathogens.  2.  History bilateral meningiomas that have been resected  3.  He indicates he is currently at [facility] for opiate use treatment  4.  Hypertension    Recommendations:    Okay with me for discharge home if ready for release per other providers  In terms of oral antibiotics to help treat/suppress any sinus infection prior to surgery would recommend:  Augmentin ES 10 mL orally every 8 hours  Would be happy to see in follow-up when he is admitted for surgery     Luis Espinosa MD  05/13/19  2:42 PM

## 2019-05-13 NOTE — PROGRESS NOTES
"Pharmacy Dosing Service  Pharmacokinetics  Vancomycin Follow-up Evaluation    Assessment/Action/Plan:  Vancomycin trough = 9.64 (~11.66 hours post dose). Changed dose from 1,750 mg every 12 hours to 1,250 mg every 8 hours at this time. Pharmacy will continue to monitor renal function and adjust dose accordingly.     Subjective:  Chirag Mata is a 43 y.o. male currently on Vancomycin 1,250 mg IV every 8 hours (adjusted from 1,750 mg q12h) for the treatment of CNS infection, day 3 of therapy (Current vancomycin end date: 5/25/19).    Objective:  Ht: 188 cm (74\"); Wt: 117 kg (259 lb)  Estimated Creatinine Clearance: 137.7 mL/min (by C-G formula based on SCr of 0.94 mg/dL).   Lab Results   Component Value Date    CREATININE 0.94 05/13/2019    CREATININE 0.94 05/12/2019    CREATININE 1.12 05/11/2019      Lab Results   Component Value Date    WBC 7.07 05/13/2019    WBC 5.51 05/12/2019    WBC 5.46 05/11/2019         Lab Results   Component Value Date    VANCOTROUGH 9.64 (L) 05/13/2019       Culture Results:  Microbiology Results (last 10 days)       Procedure Component Value - Date/Time    Blood Culture - Blood, Hand, Right [[id number]] Collected:  05/10/19 0844    Lab Status:  Preliminary result Specimen:  Blood from Hand, Right Updated:  05/12/19 0900     Blood Culture No growth at 2 days    Blood Culture - Blood, Arm, Right [[id number]] Collected:  05/10/19 0837    Lab Status:  Preliminary result Specimen:  Blood from Arm, Right Updated:  05/12/19 0900     Blood Culture No growth at 2 days            Osmel Miller PharmD   05/13/19 2:54 AM    "

## 2019-05-13 NOTE — PROGRESS NOTES
Came see patient. He is off floor to smoke per nursing. Will page back to room and try come back.     Luis Espinosa MD

## 2019-05-13 NOTE — PAYOR COMM NOTE
"Please rereview last submission,   Patient was here as observation 5/9 and 5/10  INPT admit date is 5/11/19  Ur [id number] [phone]  Fax     Chirag Mata (43 y.o. Male)     Date of Birth Social Security Number Address Home Phone MRN    1975  [address] [facility] [address] [phone] [id number]    [employer]thod[employer] Marital Status          [employer] [facility]       Admission Date Admission Type Admitting Provider Attending Provider Department, Room/Bed    5/9/19 Emergency Moise Crawley MD [place], William Lee, MD [facility] 3A, 329/1    Discharge Date Discharge Disposition Discharge Destination                       Attending Provider:  Jose Enrique Anguiano MD    Allergies:  Bactrim [Sulfamethoxazole-trimethoprim]    Isolation:  None   Infection:  None   Code Status:  CPR    Ht:  188 cm (74\")   Wt:  117 kg (259 lb)    Admission Cmt:  None   Principal Problem:  None                Active Insurance as of 5/9/2019     Primary Coverage     Payor Plan Insurance Group Employer/Plan Group    [employer] [place] [employer] [place]      Payor Plan Address Payor Plan Phone Number Payor Plan Fax Number Effective Dates    [address] [address] [phone]  10/15/2018 - None Entered    [facility] [id number]       Subscriber Name Subscriber Birth Date Member ID       CHIRAG MATA 1975 [id number]                 Emergency Contacts      (Rel.) Home Phone Work Phone Mobile Phone    Carmen Walton (Significant Other) -- -- [phone]               Physician Progress Notes (last 48 hours) (Notes from 5/11/2019  8:11 AM through 5/13/2019  8:11 AM)      Jose Enrique Anguiano MD at 5/12/2019  7:03 PM          Neurosurgery Daily Progress Note    Assessment:   Chirag Mata is a 43 y.o. male with a significant comorbidity bifrontal craniotomy resection in 2015 by Abisai Harmon and seizures.  He presents with a new problem of intractable headache. Physical exam findings of " neurologically intact.  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    DDX:     Nonintractable headache    Patient Active Problem List   Diagnosis   • Seizure (CMS/HCC)   • History of resection of meningioma   • Chronic intractable headache   • MARIA LUISA on CPAP   • Nonintractable headache       Plan:   Neuro: Stable, intact   DDX: abscess vs mucocele vs encephalocele vs recurrent tumor.   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  Combined approach with ENT   Cont Abx Cef/Vanc/Flagyl.   Cont Decadron   Toradol for HA  CV: JUICE  Pulm: JUICE, no CPAP  :  JUICE  FEN: Reg Diet  ENDO: JUICE  GI: JUICE  ID: Consult ID for suppressive Abx for 2 weeks prior to surgery  Heme:  DVT prophylaxis  Pain: Well controlle don toradol and decadron  Dispo: Anticipate DC back to [facility] on Monday.    Chief complaint:   HA    Subjective  Doing well.  HA improving    Temp:  [97.4 °F (36.3 °C)-98.6 °F (37 °C)] 97.9 °F (36.6 °C)  Heart Rate:  [80-92] 80  Resp:  [18] 18  BP: (132-148)/(89-95) 132/92    Objective    Neurologic Exam     Mental Status   Oriented to person, place, and time.   Speech: speech is normal   Level of consciousness: alert    Cranial Nerves     CN III, IV, VI   Pupils are equal, round, and reactive to light.  Extraocular motions are normal.     CN V   Facial sensation intact.     CN VII   Facial expression full, symmetric.     Motor Exam   Right arm pronator drift: absent  Left arm pronator drift: absent    Strength   Right deltoid: 5/5  Left deltoid: 5/5  Right biceps: 5/5  Left biceps: 5/5  Right triceps: 5/5  Left triceps: 5/5  Right iliopsoas: 5/5  Left iliopsoas: 5/5  Right quadriceps: 5/5  Left quadriceps: 5/5  Right gastroc: 5/5  Left gastroc: 5/5    Sensory Exam   Light touch normal.       Drains:      Imaging Results (last 24 hours)     ** No results found for the last 24 hours. **        Lab Results  (last 24 hours)     Procedure Component Value Units Date/Time    POC Glucose Once [[id number]]  (Abnormal) Collected:  05/12/19 1715    Specimen:  Blood Updated:  05/12/19 1726     Glucose 168 mg/dL      Comment: : [id number] Elsi Collier) CarolynMeter ID: [id number]       POC Glucose Once [[id number]]  (Normal) Collected:  05/12/19 1125    Specimen:  Blood Updated:  05/12/19 1136     Glucose 103 mg/dL      Comment: : [id number] Elsi Collier) CarolynMeter ID: [id number]       Blood Culture - Blood, Arm, Right [[id number]] Collected:  05/10/19 0837    Specimen:  Blood from Arm, Right Updated:  05/12/19 0900     Blood Culture No growth at 2 days    Blood Culture - Blood, Hand, Right [[id number]] Collected:  05/10/19 0844    Specimen:  Blood from Hand, Right Updated:  05/12/19 0900     Blood Culture No growth at 2 days    Basic Metabolic Panel [[id number]]  (Abnormal) Collected:  05/12/19 0618    Specimen:  Blood Updated:  05/12/19 0655     Glucose 103 mg/dL      BUN 12 mg/dL      Creatinine 0.94 mg/dL      Sodium 133 mmol/L      Potassium 4.7 mmol/L      Chloride 97 mmol/L      CO2 28.0 mmol/L      Calcium 8.8 mg/dL      eGFR   Amer 106 mL/min/1.73      BUN/Creatinine Ratio 12.8     Anion Gap 8.0 mmol/L     Narrative:       GFR Normal >60  Chronic Kidney Disease <60  Kidney Failure <15    CBC & Differential [[id number]] Collected:  05/12/19 0618    Specimen:  Blood Updated:  05/12/19 0647    Narrative:       The following orders were created for panel order CBC & Differential.  Procedure                               Abnormality         Status                     ---------                               -----------         ------                     CBC Auto Differential[[id number]]        Abnormal            Final result                 Please view results for these tests on the individual orders.    CBC Auto Differential [[id number]]  (Abnormal) Collected:  05/12/19 0618    Specimen:  Blood Updated:   05/12/19 0647     WBC 5.51 10*3/mm3      RBC 3.95 10*6/mm3      Hemoglobin 12.5 g/dL      Hematocrit 35.5 %      MCV 89.9 fL      MCH 31.6 pg      MCHC 35.2 g/dL      RDW 12.7 %      RDW-SD 42.0 fl      MPV 8.8 fL      Platelets 269 10*3/mm3      Neutrophil % 71.2 %      Lymphocyte % 20.0 %      Monocyte % 8.2 %      Eosinophil % 0.0 %      Basophil % 0.2 %      Immature Grans % 0.4 %      Neutrophils, Absolute 3.93 10*3/mm3      Lymphocytes, Absolute 1.10 10*3/mm3      Monocytes, Absolute 0.45 10*3/mm3      Eosinophils, Absolute 0.00 10*3/mm3      Basophils, Absolute 0.01 10*3/mm3      Immature Grans, Absolute 0.02 10*3/mm3      nRBC 0.0 /100 WBC     POC Glucose Once [[id number]]  (Normal) Collected:  05/11/19 2035    Specimen:  Blood Updated:  05/11/19 2056     Glucose 114 mg/dL      Comment: : [id number] Rome BeltranbethMeter ID: [id number]             [id number]  Jose Enrique Anguiano MD      Electronically signed by Jose Enrique Anguiano MD at 5/12/2019  7:07 PM     Renee Archibald APRN at 5/12/2019 10:12 AM          ENT/FPRS ([facility]) Progress Note:      Patient Care Team:  Haider Santacruz PA-C as PCP - General (Physician Assistant)  LEGACY    Active consulting complaint: headache    Subjective     Interval History: He reports improvement of his headache.  He now rates this a 4 out of 10.  This has improved since starting antibiotics, steroids, and Toradol. The patient and his significant other report his previous surgery in 2015 was performed by Dr. Abisai Harmon. Per RN, the medical records department at the outside hospital is closed over the weekend and they will attempt to obtain his outside records tomorrow morning.     History taken from: patient family RN    Review of Systems:    Review of Systems   Constitutional: Negative for chills and fever.   HENT: Negative for congestion and postnasal drip.    Respiratory: Negative for cough and shortness of breath.    Cardiovascular: Negative for chest pain.    Gastrointestinal: Negative for diarrhea, nausea and vomiting.   Neurological: Positive for headaches.       Objective     Vital Signs  Temp:  [97.4 °F (36.3 °C)-98.6 °F (37 °C)] 97.4 °F (36.3 °C)  Heart Rate:  [80-92] 83  Resp:  [16-18] 18  BP: (135-148)/(80-95) 135/89    Physical Exam:   Physical Exam   Constitutional: He is oriented to person, place, and time. He appears well-developed and well-nourished. He is cooperative.   HENT:   Head: Normocephalic.   Right Ear: External ear normal.   Left Ear: External ear normal.   Nose: No nasal deformity.   Eyes: Conjunctivae and lids are normal.   Neck: Phonation normal. Neck supple.   Pulmonary/Chest: Effort normal. No respiratory distress.   Neurological: He is alert and oriented to person, place, and time. He has normal strength.   Skin: Skin is warm and dry.   Psychiatric: He has a normal mood and affect. His speech is normal and behavior is normal.        Results Review:       Lab Results (last 24 hours)     Procedure Component Value Units Date/Time    Blood Culture - Blood, Arm, Right [[id number]] Collected:  05/10/19 0837    Specimen:  Blood from Arm, Right Updated:  05/12/19 0900     Blood Culture No growth at 2 days    Blood Culture - Blood, Hand, Right [[id number]] Collected:  05/10/19 0844    Specimen:  Blood from Hand, Right Updated:  05/12/19 0900     Blood Culture No growth at 2 days    Basic Metabolic Panel [[id number]]  (Abnormal) Collected:  05/12/19 0618    Specimen:  Blood Updated:  05/12/19 0655     Glucose 103 mg/dL      BUN 12 mg/dL      Creatinine 0.94 mg/dL      Sodium 133 mmol/L      Potassium 4.7 mmol/L      Chloride 97 mmol/L      CO2 28.0 mmol/L      Calcium 8.8 mg/dL      eGFR   Amer 106 mL/min/1.73      BUN/Creatinine Ratio 12.8     Anion Gap 8.0 mmol/L     Narrative:       GFR Normal >60  Chronic Kidney Disease <60  Kidney Failure <15    CBC & Differential [[id number]] Collected:  05/12/19 0618    Specimen:  Blood Updated:  05/12/19  0647    Narrative:       The following orders were created for panel order CBC & Differential.  Procedure                               Abnormality         Status                     ---------                               -----------         ------                     CBC Auto Differential[[id number]]        Abnormal            Final result                 Please view results for these tests on the individual orders.    CBC Auto Differential [[id number]]  (Abnormal) Collected:  05/12/19 0618    Specimen:  Blood Updated:  05/12/19 0647     WBC 5.51 10*3/mm3      RBC 3.95 10*6/mm3      Hemoglobin 12.5 g/dL      Hematocrit 35.5 %      MCV 89.9 fL      MCH 31.6 pg      MCHC 35.2 g/dL      RDW 12.7 %      RDW-SD 42.0 fl      MPV 8.8 fL      Platelets 269 10*3/mm3      Neutrophil % 71.2 %      Lymphocyte % 20.0 %      Monocyte % 8.2 %      Eosinophil % 0.0 %      Basophil % 0.2 %      Immature Grans % 0.4 %      Neutrophils, Absolute 3.93 10*3/mm3      Lymphocytes, Absolute 1.10 10*3/mm3      Monocytes, Absolute 0.45 10*3/mm3      Eosinophils, Absolute 0.00 10*3/mm3      Basophils, Absolute 0.01 10*3/mm3      Immature Grans, Absolute 0.02 10*3/mm3      nRBC 0.0 /100 WBC     POC Glucose Once [[id number]]  (Normal) Collected:  05/11/19 2035    Specimen:  Blood Updated:  05/11/19 2056     Glucose 114 mg/dL      Comment: : [id number] Mu RubybethMeter ID: [id number]       POC Glucose Once [[id number]]  (Abnormal) Collected:  05/11/19 1656    Specimen:  Blood Updated:  05/11/19 1707     Glucose 157 mg/dL      Comment: : [id number] Elsi SignalPoint CommunicationsErik) CarolynMeter ID: [id number]       POC Glucose Once [[id number]]  (Abnormal) Collected:  05/11/19 1332    Specimen:  Blood Updated:  05/11/19 1343     Glucose 150 mg/dL      Comment: : [id number] Elsi (Erik) CarolynMeter ID: [id number]       C-reactive Protein [[id number]]  (Abnormal) Collected:  05/11/19 0526    Specimen:  Blood Updated:  05/11/19 1245      C-Reactive Protein 13.53 mg/dL         Imaging Results (last 24 hours)     ** No results found for the last 24 hours. **          Medication Review:     Current Facility-Administered Medications   Medication Dose Route Frequency Provider Last Rate Last Dose   • acetaminophen (TYLENOL) tablet 650 mg  650 mg Oral Q4H PRN Moise Crawley MD   650 mg at 05/10/19 1552   • amitriptyline (ELAVIL) tablet 50 mg  50 mg Oral Nightly Moise Crawley MD   50 mg at 05/11/19 2253   • carBAMazepine (TEGretol) tablet 400 mg  400 mg Oral BID Moise Crawley MD   400 mg at 05/12/19 0850   • cefepime (MAXIPIME) 1 g/100 mL 0.9% NS IVPB (mbp)  1 g Intravenous Q8H Jose Enrique Anguiano  mL/hr at 05/12/19 0619 1 g at 05/12/19 0619   • dexamethasone (DECADRON) tablet 4 mg  4 mg Oral Q6H Jose Enrique Anguiano MD   4 mg at 05/12/19 0618   • dextrose (D50W) 25 g/ 50mL Intravenous Solution 25 g  25 g Intravenous Q15 Min PRN Jose Enrique Anguiano MD       • dextrose (GLUTOSE) oral gel 15 g  15 g Oral Q15 Min PRN Jose Enrique Anguiano MD       • glucagon (human recombinant) (GLUCAGEN DIAGNOSTIC) injection 1 mg  1 mg Subcutaneous PRN Jose Enrique Anguiano MD       • hydrALAZINE (APRESOLINE) tablet 10 mg  10 mg Oral Daily Moise Crawley MD   10 mg at 05/12/19 0851   • hydrochlorothiazide (HYDRODIURIL) tablet 25 mg  25 mg Oral Daily Moise Crawley MD   25 mg at 05/12/19 0850   • insulin lispro (humaLOG) injection 2-7 Units  2-7 Units Subcutaneous 4x Daily With Meals & Nightly Joes Enrique Anguiano MD       • ketorolac (TORADOL) injection 15 mg  15 mg Intravenous Q6H PRN Jose Enrique Anguiano MD   15 mg at 05/12/19 0251   • lactated ringers infusion  100 mL/hr Intravenous Florencio Lora  mL/hr at 05/10/19 0523 100 mL/hr at 05/10/19 0523   • metroNIDAZOLE (FLAGYL) IVPB 500 mg  500 mg Intravenous Q8H Jose Enrique Anguiano MD   500 mg at 05/12/19 0525   • ondansetron (ZOFRAN) injection 4 mg   4 mg Intravenous Q6H PRN Moise Crawley MD       • pantoprazole (PROTONIX) EC tablet 40 mg  40 mg Oral Q AM Jose Enrique Anguiano MD   40 mg at 05/12/19 0618   • prochlorperazine (COMPAZINE) injection 5 mg  5 mg Intravenous Q6H PRN Moise Crawley MD   5 mg at 05/12/19 0251   • sodium chloride 0.9 % flush 10 mL  10 mL Intravenous PRN Abiel Hills MD       • sodium chloride 0.9 % flush 3 mL  3 mL Intravenous Q12H Moise Crawley MD   3 mL at 05/12/19 0850   • sodium chloride 0.9 % flush 3-10 mL  3-10 mL Intravenous PRN Moise Crawley MD   10 mL at 05/11/19 1151   • traMADol (ULTRAM) tablet 50 mg  50 mg Oral Q6H PRN Moise Crawley MD       • vancomycin (VANCOCIN) 1,750 mg in sodium chloride 0.9 % 500 mL IVPB  15 mg/kg Intravenous Q12H Jose Enrique Anguiano MD   1,750 mg at 05/12/19 0251       Assessment/Plan       Nonintractable headache      His RN will try to obtain outside medical records tomorrow morning. Continue current treatment plan per Dr. Anguiano. Further surgical planning per Dr. Anguiano and Dr. Brewer after review of medical records/operative reports.     ROBERT Brito  05/12/19  10:46 AM        Electronically signed by Renee Archibald APRN at 5/12/2019 11:45 AM     Moise Crawley MD at 5/12/2019 10:04 AM            Neurology Progress Note      Chief Complaint:  Headache    Subjective     Subjective:    No events overnight  Medications:  Current Facility-Administered Medications   Medication Dose Route Frequency Provider Last Rate Last Dose   • acetaminophen (TYLENOL) tablet 650 mg  650 mg Oral Q4H PRN Moise Crawley MD   650 mg at 05/10/19 1552   • amitriptyline (ELAVIL) tablet 50 mg  50 mg Oral Nightly Moise Crawley MD   50 mg at 05/11/19 2253   • carBAMazepine (TEGretol) tablet 400 mg  400 mg Oral BID Moise Crawley MD   400 mg at 05/12/19 0850   • cefepime (MAXIPIME) 1 g/100 mL 0.9% NS IVPB (mbp)  1 g Intravenous Q8H Jose Enrique Anguiano,   mL/hr at 05/12/19 0619 1 g at 05/12/19 0619   • dexamethasone (DECADRON) tablet 4 mg  4 mg Oral Q6H Jose Enrique Anguiano MD   4 mg at 05/12/19 0618   • dextrose (D50W) 25 g/ 50mL Intravenous Solution 25 g  25 g Intravenous Q15 Min PRN Jose Enrique Anguiano MD       • dextrose (GLUTOSE) oral gel 15 g  15 g Oral Q15 Min PRN Jose Enrique Anguiano MD       • glucagon (human recombinant) (GLUCAGEN DIAGNOSTIC) injection 1 mg  1 mg Subcutaneous PRN Jose Enrique Anguiano MD       • hydrALAZINE (APRESOLINE) tablet 10 mg  10 mg Oral Daily Moise Crawley MD   10 mg at 05/12/19 0851   • hydrochlorothiazide (HYDRODIURIL) tablet 25 mg  25 mg Oral Daily Moise Crawley MD   25 mg at 05/12/19 0850   • insulin lispro (humaLOG) injection 2-7 Units  2-7 Units Subcutaneous 4x Daily With Meals & Nightly Jose Enrique Anguiano MD       • ketorolac (TORADOL) injection 15 mg  15 mg Intravenous Q6H PRN Jose Enrique Anguiano MD   15 mg at 05/12/19 0251   • lactated ringers infusion  100 mL/hr Intravenous Continuous Florencio Spivey  mL/hr at 05/10/19 0523 100 mL/hr at 05/10/19 0523   • metroNIDAZOLE (FLAGYL) IVPB 500 mg  500 mg Intravenous Q8H Jose Enrique Anguiano MD   500 mg at 05/12/19 0525   • ondansetron (ZOFRAN) injection 4 mg  4 mg Intravenous Q6H PRN Moise Crawley MD       • pantoprazole (PROTONIX) EC tablet 40 mg  40 mg Oral Q AM Jose Enrique Anguiano MD   40 mg at 05/12/19 0618   • prochlorperazine (COMPAZINE) injection 5 mg  5 mg Intravenous Q6H PRN Moise Crawley MD   5 mg at 05/12/19 0251   • sodium chloride 0.9 % flush 10 mL  10 mL Intravenous PRN Abiel Hills MD       • sodium chloride 0.9 % flush 3 mL  3 mL Intravenous Q12H Moise Crawley MD   3 mL at 05/12/19 0850   • sodium chloride 0.9 % flush 3-10 mL  3-10 mL Intravenous PRN Moise Crawley MD   10 mL at 05/11/19 1151   • traMADol (ULTRAM) tablet 50 mg  50 mg Oral Q6H PRN Moise Crawley MD       •  vancomycin (VANCOCIN) 1,750 mg in sodium chloride 0.9 % 500 mL IVPB  15 mg/kg Intravenous Q12H Jose Enrique Anguiano MD   1,750 mg at 05/12/19 0251       Review of Systems:   -A 14 point review of systems is completed and is negative except for headache      Objective      Vital Signs  Temp:  [97.4 °F (36.3 °C)-98.6 °F (37 °C)] 97.4 °F (36.3 °C)  Heart Rate:  [80-92] 83  Resp:  [16-18] 18  BP: (135-148)/(80-95) 135/89    Physical Exam:    General Exam:  Head:  Normal cephalic, atraumatic  HEENT:  Neck supple  Fundoscopic Exam:  No signs of disc edema  CVS:  Regular rate and rhythm.  No murmurs  Carotid Examination:  No bruits  Lungs:  Clear to auscultation  Abdomen:  Non-tender, Non-distended  Extremities:  No signs of peripheral edema  Skin:  No rashes    Neurologic Exam:    Mental Status:    -Awake, Alert, Oriented X 3  -No word finding difficulties  -No aphasia  -No dysarthria  -Follows simple and complex commands    CN II:  Visual fields full.  Pupils equally reactive to light  CN III, IV, VI:  Extraocular Muscles full with no signs of nystagmus  CN V:  Facial sensory is symmetric with no asymmetries.  CN VII:  Facial motor symmetric  CN VIII:  Gross hearing intact bilaterally  CN IX:  Palate elevates symmetrically  CN X:  Palate elevates symmetrically  CN XI:  Shoulder shrug symmetric  CN XII:  Tongue is midline on protrusion    Motor: (strength out of 5:  1= minimal movement, 2 = movement in plane of gravity, 3 = movement against gravity, 4 = movement against some resistance, 5 = full strength)    -Right Upper Ext: Proximal: 5 Distal: 5  -Left Upper Ext: Proximal: 5 Distal: 5    -Right Lower Ext: Proximal: 5 Distal: 5  -Left Lower Ext: Proximal: 5 Distal: 5    DTR:  -Right   Bicep: 2+ Tricep: 2+ Brachioradialis: 2+   Patella: 2+ Ankle: 2+ Neg Babinski  -Left   Bicep: 2+ Tricep: 2+ Brachioradialis: 2+   Patella: 2+ Ankle: 2+ Neg Babinski    Sensory:  -Intact to light touch, pinprick, temperature, pain, and  proprioception    Coordination:  -Finger to nose intact  -Heel to shin intact  -No ataxia    Gait  -No signs of ataxia  -ambulates unassisted       Results Review:    I reviewed the patient's new clinical results.    Results from last 7 days   Lab Units 05/12/19  0618 05/11/19  0526 05/10/19  0837   WBC 10*3/mm3 5.51 5.46 4.48*   HEMOGLOBIN g/dL 12.5* 12.8* 12.7*   HEMATOCRIT % 35.5* 37.4* 36.1*   PLATELETS 10*3/mm3 269 270 253        Results from last 7 days   Lab Units 05/12/19 0618 05/11/19  0526 05/10/19  0837 05/09/19  2205   SODIUM mmol/L 133* 134* 134* 132*   POTASSIUM mmol/L 4.7 4.7 3.9 3.8   CHLORIDE mmol/L 97* 95* 95* 91*   CO2 mmol/L 28.0 31.0 30.0 33.0*   BUN mg/dL 12 13 10 11   CREATININE mg/dL 0.94 1.12 0.98 1.01   CALCIUM mg/dL 8.8 9.3 9.4 9.3   BILIRUBIN mg/dL  --   --   --  0.3   ALK PHOS U/L  --   --   --  64   ALT (SGPT) U/L  --   --   --  22   AST (SGOT) U/L  --   --   --  27   GLUCOSE mg/dL 103* 95 104* 102*        Lab Results   Component Value Date    MG 2.2 10/15/2018    PROTIME 13.0 10/15/2018    INR 0.95 10/15/2018     No components found for: POCGLUC  No components found for: A1C  No results found for: HDL, LDL  No components found for: B12  Lab Results   Component Value Date    TSH 3.230 10/15/2018     MRI brain with without contrast reviewed by me.  There is evidence of a large hypercellular mass in the sphenoid and ethmoid sinuses that extends up into the anterior cranial fossa.  Is very atypical.  The contrast-enhancement is not continuous.  This is a very atypical look for meningioma although this cannot be ruled out.    CT of sinuses was performed overnight as well and results are as below:  Redemonstration of mass at the anterior cranial fossa which has bony  margins measuring about 3.4 x 3.1 x 2.2 cm (AP by transverse by  craniocaudal). The bony cortex is severely thinned and may be dehiscent  at the anterior inferior aspect such as sagittal series 10 image 49.   Assessment/Plan      Hospital Problem List      Nonintractable headache    Impression:  1.  Headache: Now improved after steroid administration.  We will continue to avoid narcotics.  2.  Frontal mass lesion with extension into the sinuses -I have discussed the case with neurosurgery and will await ENT recommendations as well.  It is my understanding that the differential diagnosis will remain an atypical meningioma with recurrence from the previous surgical site.  Perhaps even a bigger concern is an atypical abscess in the previous surgical bed.  This may or may not be associated with the previous facial infection with lancing and antibiotics that occurred several weeks ago.  Regardless it sounds as if the initial goal will be to provide the patient with broad-spectrum antibiotics while ENT and neurosurgery discussed the case in more detail and decide on the patient's candidacy for further surgical intervention.  3.  History of drug dependence  4.  S/P bilateral meningioma resection  5.  MARIA LUISA on CPAP at home    Plan:  · Decadron 4mg Q6H started on 5/10  · Cefepime 1gm Q8H started on 5/11  · Vancocin 1750mg Q12H started on 5/11  · CPAP stopped as not not push air into the region with the abcess  · Continue Tegretol for seizure prevention and headaches  · Tobacco cessation counseling  · Avoid mind altering medications in accordance with pain contract patient made with [facility]  · Discussed with Neurosurgery yesterday.  They have agreed to become the primary attending on the case given that the patient will require surgical intervention moving forward.  Neurology will sign off but I will arrange in the neurology office for CPAP to be discontinued and will also arrange follow-up with Corazon Glasgow.        Moise Crawley MD  05/12/19  10:04 AM      Electronically signed by Moise Crawley MD at 5/12/2019 10:06 AM     Moise Crawley MD at 5/11/2019 11:52 AM            Neurology Progress Note      Chief Complaint:   Headache    Subjective     Subjective:    Headache improved today after steroids given by neurosurgery overnight.  The patient is anxious to go home although understands the need for further work-up and treatment.  Both ENT and neurosurgery consulted on the patient overnight.  They have plans to discuss treatment plans further.  Neurosurgery began an antibiotic course with broad-spectrum antibiotics.  Medications:  Current Facility-Administered Medications   Medication Dose Route Frequency Provider Last Rate Last Dose   • acetaminophen (TYLENOL) tablet 650 mg  650 mg Oral Q4H PRN Moise Crawley MD   650 mg at 05/10/19 1552   • amitriptyline (ELAVIL) tablet 50 mg  50 mg Oral Nightly Moise Crawley MD   50 mg at 05/10/19 2111   • carBAMazepine (TEGretol) tablet 400 mg  400 mg Oral BID Moise Crawley MD   400 mg at 05/11/19 0914   • cefepime (MAXIPIME) 1 g/100 mL 0.9% NS IVPB (mbp)  1 g Intravenous Q8H Jose Enrique Anguiano  mL/hr at 05/11/19 1151 1 g at 05/11/19 1151   • dexamethasone (DECADRON) tablet 4 mg  4 mg Oral Q6H Jose Enrique Anguiano MD   4 mg at 05/11/19 0530   • hydrALAZINE (APRESOLINE) tablet 10 mg  10 mg Oral Daily Moise Crawley MD   10 mg at 05/11/19 0914   • hydrochlorothiazide (HYDRODIURIL) tablet 25 mg  25 mg Oral Daily Moise Crawley MD   25 mg at 05/11/19 0914   • ketorolac (TORADOL) injection 15 mg  15 mg Intravenous Q6H PRN Jose Enrique Anguiano MD   15 mg at 05/11/19 1149   • lactated ringers infusion  100 mL/hr Intravenous Continuous Florencio Spivey  mL/hr at 05/10/19 0523 100 mL/hr at 05/10/19 0523   • meperidine (DEMEROL) injection 25 mg  25 mg Intravenous Once Florencio Spivey MD       • metroNIDAZOLE (FLAGYL) IVPB 500 mg  500 mg Intravenous Q8H Jose Enrique Anguiano MD       • ondansetron (ZOFRAN) injection 4 mg  4 mg Intravenous Q6H PRN Moise Crawley MD       • prochlorperazine (COMPAZINE) injection 5 mg  5 mg Intravenous Q6H PRN  Moise Crawley MD   5 mg at 05/11/19 1149   • sodium chloride 0.9 % flush 10 mL  10 mL Intravenous PRN Abiel Hills MD       • sodium chloride 0.9 % flush 3 mL  3 mL Intravenous Q12H Moise Crawley MD   3 mL at 05/11/19 0914   • sodium chloride 0.9 % flush 3-10 mL  3-10 mL Intravenous PRN Moise Crawley MD   10 mL at 05/11/19 1151   • traMADol (ULTRAM) tablet 50 mg  50 mg Oral Q6H PRN Moise Crawley MD       • vancomycin (VANCOCIN) 1,750 mg in sodium chloride 0.9 % 500 mL IVPB  15 mg/kg Intravenous Q12H Jose Enrique Anguiano MD           Review of Systems:   -A 14 point review of systems is completed and is negative except for headache      Objective      Vital Signs  Temp:  [98.5 °F (36.9 °C)-99 °F (37.2 °C)] 98.5 °F (36.9 °C)  Heart Rate:  [85-96] 96  Resp:  [16-18] 18  BP: (131-151)/(87-99) 131/93    Physical Exam:    General Exam:  Head:  Normal cephalic, atraumatic  HEENT:  Neck supple  Fundoscopic Exam:  No signs of disc edema  CVS:  Regular rate and rhythm.  No murmurs  Carotid Examination:  No bruits  Lungs:  Clear to auscultation  Abdomen:  Non-tender, Non-distended  Extremities:  No signs of peripheral edema  Skin:  No rashes    Neurologic Exam:    Mental Status:    -Awake, Alert, Oriented X 3  -No word finding difficulties  -No aphasia  -No dysarthria  -Follows simple and complex commands    CN II:  Visual fields full.  Pupils equally reactive to light  CN III, IV, VI:  Extraocular Muscles full with no signs of nystagmus  CN V:  Facial sensory is symmetric with no asymmetries.  CN VII:  Facial motor symmetric  CN VIII:  Gross hearing intact bilaterally  CN IX:  Palate elevates symmetrically  CN X:  Palate elevates symmetrically  CN XI:  Shoulder shrug symmetric  CN XII:  Tongue is midline on protrusion    Motor: (strength out of 5:  1= minimal movement, 2 = movement in plane of gravity, 3 = movement against gravity, 4 = movement against some resistance, 5 = full  strength)    -Right Upper Ext: Proximal: 5 Distal: 5  -Left Upper Ext: Proximal: 5 Distal: 5    -Right Lower Ext: Proximal: 5 Distal: 5  -Left Lower Ext: Proximal: 5 Distal: 5    DTR:  -Right   Bicep: 2+ Tricep: 2+ Brachioradialis: 2+   Patella: 2+ Ankle: 2+ Neg Babinski  -Left   Bicep: 2+ Tricep: 2+ Brachioradialis: 2+   Patella: 2+ Ankle: 2+ Neg Babinski    Sensory:  -Intact to light touch, pinprick, temperature, pain, and proprioception    Coordination:  -Finger to nose intact  -Heel to shin intact  -No ataxia    Gait  -No signs of ataxia  -ambulates unassisted       Results Review:    I reviewed the patient's new clinical results.    Results from last 7 days   Lab Units 05/11/19  0526 05/10/19  0837 05/10/19  0454   WBC 10*3/mm3 5.46 4.48* 5.42   HEMOGLOBIN g/dL 12.8* 12.7* 12.0*   HEMATOCRIT % 37.4* 36.1* 34.7*   PLATELETS 10*3/mm3 270 253 256        Results from last 7 days   Lab Units 05/11/19  0526 05/10/19  0837 05/09/19  2205   SODIUM mmol/L 134* 134* 132*   POTASSIUM mmol/L 4.7 3.9 3.8   CHLORIDE mmol/L 95* 95* 91*   CO2 mmol/L 31.0 30.0 33.0*   BUN mg/dL 13 10 11   CREATININE mg/dL 1.12 0.98 1.01   CALCIUM mg/dL 9.3 9.4 9.3   BILIRUBIN mg/dL  --   --  0.3   ALK PHOS U/L  --   --  64   ALT (SGPT) U/L  --   --  22   AST (SGOT) U/L  --   --  27   GLUCOSE mg/dL 95 104* 102*        Lab Results   Component Value Date    MG 2.2 10/15/2018    PROTIME 13.0 10/15/2018    INR 0.95 10/15/2018     No components found for: POCGLUC  No components found for: A1C  No results found for: HDL, LDL  No components found for: B12  Lab Results   Component Value Date    TSH 3.230 10/15/2018     MRI brain with without contrast reviewed by me.  There is evidence of a large hypercellular mass in the sphenoid and ethmoid sinuses that extends up into the anterior cranial fossa.  Is very atypical.  The contrast-enhancement is not continuous.  This is a very atypical look for meningioma although this cannot be ruled out.    CT of  sinuses was performed overnight as well and results are as below:  Redemonstration of mass at the anterior cranial fossa which has bony  margins measuring about 3.4 x 3.1 x 2.2 cm (AP by transverse by  craniocaudal). The bony cortex is severely thinned and may be dehiscent  at the anterior inferior aspect such as sagittal series 10 image 49.   Assessment/Plan     Hospital Problem List      Nonintractable headache    Impression:  1.   Headache: Now improved after steroid administration.  We will continue to avoid narcotics.  2.  Frontal mass lesion with extension into the sinuses - I have discussed the case with neurosurgery and will await ENT recommendations as well.  It is my understanding that the differential diagnosis will remain an atypical meningioma with recurrence from the previous surgical site.  Perhaps even a bigger concern is an atypical abscess in the previous surgical bed.  This may or may not be associated with the previous facial infection with lancing and antibiotics that occurred several weeks ago.  Regardless it sounds as if the initial goal will be to provide the patient with broad-spectrum antibiotics while ENT and neurosurgery discussed the case in more detail and decide on the patient's candidacy for further surgical intervention.  3.  History of drug dependence  4.  S/P bilateral meningioma resection  5.  MARIA LUISA on CPAP at home    Plan:  · Decadron 4mg Q6H started on 5/10  · Cefepime 1gm Q8H started on 5/11  · Vancocin 1750mg Q12H started on 5/11  · CPAP stopped as not not push air into the region with the abcess  · Continue Tegretol for seizure prevention and headaches  · Tobacco cessation counseling  · Avoid mind altering medications in accordance with pain contract patient made with [facility]  · Will await further discussions of ENT and Neurosurgery        Moise Crawley MD  05/11/19  11:52 AM      Electronically signed by Moise Crawley MD at 5/11/2019 12:01 PM        Consult Notes (last 48 hours) (Notes from 5/11/2019  8:11 AM through 5/13/2019  8:11 AM)     No notes of this type exist for this encounter.

## 2019-05-14 VITALS
BODY MASS INDEX: 33.24 KG/M2 | RESPIRATION RATE: 18 BRPM | OXYGEN SATURATION: 100 % | HEART RATE: 82 BPM | TEMPERATURE: 97.5 F | HEIGHT: 74 IN | WEIGHT: 259 LBS | DIASTOLIC BLOOD PRESSURE: 66 MMHG | SYSTOLIC BLOOD PRESSURE: 110 MMHG

## 2019-05-14 LAB
ANION GAP SERPL CALCULATED.3IONS-SCNC: 7 MMOL/L (ref 4–13)
BASOPHILS # BLD AUTO: 0.01 10*3/MM3 (ref 0–0.2)
BASOPHILS NFR BLD AUTO: 0.1 % (ref 0–2)
BUN BLD-MCNC: 12 MG/DL (ref 5–21)
BUN/CREAT SERPL: 14 (ref 7–25)
CALCIUM SPEC-SCNC: 8.8 MG/DL (ref 8.4–10.4)
CHLORIDE SERPL-SCNC: 94 MMOL/L (ref 98–110)
CO2 SERPL-SCNC: 30 MMOL/L (ref 24–31)
CREAT BLD-MCNC: 0.86 MG/DL (ref 0.5–1.4)
DEPRECATED RDW RBC AUTO: 42.2 FL (ref 40–54)
EOSINOPHIL # BLD AUTO: 0 10*3/MM3 (ref 0–0.7)
EOSINOPHIL NFR BLD AUTO: 0 % (ref 0–4)
ERYTHROCYTE [DISTWIDTH] IN BLOOD BY AUTOMATED COUNT: 12.8 % (ref 12–15)
GFR SERPL CREATININE-BSD FRML MDRD: 118 ML/MIN/1.73
GLUCOSE BLD-MCNC: 110 MG/DL (ref 70–100)
GLUCOSE BLDC GLUCOMTR-MCNC: 106 MG/DL (ref 70–130)
GLUCOSE BLDC GLUCOMTR-MCNC: 127 MG/DL (ref 70–130)
HCT VFR BLD AUTO: 36 % (ref 40–52)
HGB BLD-MCNC: 12.7 G/DL (ref 14–18)
IMM GRANULOCYTES # BLD AUTO: 0.03 10*3/MM3 (ref 0–0.05)
IMM GRANULOCYTES NFR BLD AUTO: 0.4 % (ref 0–5)
LYMPHOCYTES # BLD AUTO: 1.67 10*3/MM3 (ref 0.72–4.86)
LYMPHOCYTES NFR BLD AUTO: 22.7 % (ref 15–45)
MCH RBC QN AUTO: 32 PG (ref 28–32)
MCHC RBC AUTO-ENTMCNC: 35.3 G/DL (ref 33–36)
MCV RBC AUTO: 90.7 FL (ref 82–95)
MONOCYTES # BLD AUTO: 0.51 10*3/MM3 (ref 0.19–1.3)
MONOCYTES NFR BLD AUTO: 6.9 % (ref 4–12)
NEUTROPHILS # BLD AUTO: 5.14 10*3/MM3 (ref 1.87–8.4)
NEUTROPHILS NFR BLD AUTO: 69.9 % (ref 39–78)
NRBC BLD AUTO-RTO: 0 /100 WBC (ref 0–0.2)
PLATELET # BLD AUTO: 314 10*3/MM3 (ref 130–400)
PMV BLD AUTO: 8.8 FL (ref 6–12)
POTASSIUM BLD-SCNC: 4.3 MMOL/L (ref 3.5–5.3)
RBC # BLD AUTO: 3.97 10*6/MM3 (ref 4.8–5.9)
SODIUM BLD-SCNC: 131 MMOL/L (ref 135–145)
WBC NRBC COR # BLD: 7.36 10*3/MM3 (ref 4.8–10.8)

## 2019-05-14 PROCEDURE — 63710000001 DEXAMETHASONE PER 0.25 MG: Performed by: NEUROLOGICAL SURGERY

## 2019-05-14 PROCEDURE — G0378 HOSPITAL OBSERVATION PER HR: HCPCS

## 2019-05-14 PROCEDURE — 82962 GLUCOSE BLOOD TEST: CPT

## 2019-05-14 PROCEDURE — 80048 BASIC METABOLIC PNL TOTAL CA: CPT | Performed by: PSYCHIATRY & NEUROLOGY

## 2019-05-14 PROCEDURE — 99238 HOSP IP/OBS DSCHRG MGMT 30/<: CPT | Performed by: NURSE PRACTITIONER

## 2019-05-14 PROCEDURE — 85025 COMPLETE CBC W/AUTO DIFF WBC: CPT | Performed by: PSYCHIATRY & NEUROLOGY

## 2019-05-14 RX ORDER — DEXAMETHASONE 1 MG
TABLET ORAL
Qty: 30 TABLET | Refills: 0 | Status: SHIPPED | OUTPATIENT
Start: 2019-05-14 | End: 2019-05-28 | Stop reason: SDUPTHER

## 2019-05-14 RX ORDER — AMOXICILLIN AND CLAVULANATE POTASSIUM 600; 42.9 MG/5ML; MG/5ML
1200 POWDER, FOR SUSPENSION ORAL EVERY 8 HOURS
Qty: 300 ML | Refills: 0 | Status: SHIPPED | OUTPATIENT
Start: 2019-05-14 | End: 2019-05-24

## 2019-05-14 RX ADMIN — HYDROCHLOROTHIAZIDE 25 MG: 25 TABLET ORAL at 08:22

## 2019-05-14 RX ADMIN — CARBAMAZEPINE 400 MG: 200 TABLET ORAL at 08:22

## 2019-05-14 RX ADMIN — AMOXICILLIN AND CLAVULANATE POTASSIUM 1200 MG: 600; 42.9 SUSPENSION ORAL at 06:56

## 2019-05-14 RX ADMIN — HYDRALAZINE HYDROCHLORIDE 10 MG: 10 TABLET, FILM COATED ORAL at 08:22

## 2019-05-14 RX ADMIN — PANTOPRAZOLE SODIUM 40 MG: 40 TABLET, DELAYED RELEASE ORAL at 06:55

## 2019-05-14 RX ADMIN — DEXAMETHASONE 4 MG: 4 TABLET ORAL at 06:56

## 2019-05-14 RX ADMIN — DEXAMETHASONE 4 MG: 4 TABLET ORAL at 00:15

## 2019-05-14 NOTE — PROGRESS NOTES
Continued Stay Note   Alexei     Patient Name: Chirag Mata  MRN: [id number]  Today's Date: 5/14/2019    Admit Date: 5/9/2019    Discharge Plan     Row Name 05/14/19 1028       Plan    Final Discharge Disposition Code  01 - home or self-care    Final Note  PT IS BEING DCD HOME TODAY WITH NO DC NEEDS. PT IS BEING DCD ON PO ABX. PT DOES NOT NEED ANY DME OR HH. [facility]        Discharge Codes    No documentation.       Expected Discharge Date and Time     Expected Discharge Date Expected Discharge Time    May 14, 2019             LEELEE Worley

## 2019-05-14 NOTE — PLAN OF CARE
Problem: Patient Care Overview  Goal: Plan of Care Review  Outcome: Ongoing (interventions implemented as appropriate)   05/13/19 1449 05/14/19 0506   Coping/Psychosocial   Plan of Care Reviewed With patient --    Plan of Care Review   Progress improving --    OTHER   Outcome Summary --  Pt rested, Pt given PRN for h/a, Pt no signs of distress        Problem: Pain, Acute (Adult)  Goal: Acceptable Pain Control/Comfort Level  Outcome: Ongoing (interventions implemented as appropriate)      Problem: Infection, Risk/Actual (Adult)  Goal: Infection Prevention/Resolution  Outcome: Ongoing (interventions implemented as appropriate)

## 2019-05-14 NOTE — DISCHARGE SUMMARY
Date of Discharge:  5/14/2019    Discharge Diagnosis:    non-intractable headache  Intracranial mass  History of resection of meningioma  History of seizure disorder    Patient Active Problem List   Diagnosis   • Seizure (CMS/HCC)   • History of resection of meningioma   • Chronic intractable headache   • MARIA LUISA on CPAP   • Nonintractable headache     1. Nonintractable headache, unspecified chronicity pattern, unspecified headache type    2. Intracranial mass      Presenting Problem/History of Present Illness  Nonintractable headache, unspecified chronicity pattern, unspecified headache type [R51]   1. Nonintractable headache, unspecified chronicity pattern, unspecified headache type    2. Intracranial mass      Hospital Course  Patient is a 43 y.o. male presented with significant comorbidity bifrontal craniotomy resection in 2015 by Abisai Harmon and seizures.  He presents with a new problem of intractable headache. Physical exam findings of neurologically intact.  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    Mr. Mata has been kept in the hospital for close neurologic monitoring and pain control.  Since admission, his neurologic status has remained unchanged and he has felt a significant improvement in his headaches.  He  has been able to tolerate oral diet, oral pain medications, and void.  He has been evaluated by physical therapy and occupational therapy and deemed safe for discharge home with family.  Additional instructions and follow-up appointments provided.    Procedures Performed: NA       Consults:   Consults     Date and Time Order Name Status Description    5/12/2019 1843 Inpatient Infectious Diseases Consult Completed     5/10/2019 1034 Inpatient Neurosurgery Consult Completed     5/10/2019 0842 Inpatient ENT Consult Completed         Pertinent Test Results: Ct Head Without Contrast    Result Date: 5/10/2019  1. Mass in the floor of the  anterior cranial fossa extending through the floor of the frontal cranial fossa and into the sphenoid/ethmoid sinus region. This is predominantly soft tissue density and may represent recurrent tumor. Infection is also in the differential. The patient's previous diagnosis is meningioma. 2. Pre-existing encephalomalacia/gliosis in the anterior and inferior frontal lobes.  This report was finalized on 05/10/2019 08:43 by Dr. Josef Hung MD.    Mri Brain With & Without Contrast    Result Date: 5/10/2019  1. Large hypercellular mass in the sphenoid and ethmoid sinuses extending superiorly through to the floor of the anterior cranial fossa. Imaging characteristics suggest a hypercellular neoplasm, recurrent meningioma considered. 2. Postsurgical changes frontal lobes. 3. Cerebellar tonsillar ectopia slightly more conspicuous compared to the previous study. These findings suggest the possibility of increased intracranial pressure. This report was finalized on 05/10/2019 16:17 by Dr. Nabil Blanchard MD.    Ct Sinus Without Contrast    Result Date: 5/11/2019  1. Redemonstration of anterior cranial fossa mass with anatomic considerations described in detail above. 2. Bifrontal hypodensity with ex vacuo dilation of the anterior horns of the lateral ventricles, likely encephalomalacia. This report was finalized on 05/11/2019 00:36 by Dr Sonya Comer MD.    Ct Venogram Head W Contrast    Result Date: 5/10/2019  1. Suggestion of recurrent mass along the floor of the frontal cranial fossa extending through the floor and into the sphenoid and ethmoid sinus region. There is enhancement of the mass with scattered areas of low density. Infection is in the differential, including fungal infection. Given prior history of meningioma, this may represent recurrent meningioma, although the appearance is somewhat atypical. Meningioma typically enhances homogeneously. Other tumors cannot be excluded. 2. No major arterial branch  occlusions. The venous sinuses are patent but somewhat small in caliber. There is also a partially empty appearance of the sella turcica.  The findings were discussed with Dr. Crawley. This report was finalized on 05/10/2019 08:42 by Dr. Josef Hung MD.    Condition on Discharge:  Stable     Vital Signs  Temp:  [97.5 °F (36.4 °C)-98.8 °F (37.1 °C)] 97.5 °F (36.4 °C)  Heart Rate:  [82-92] 82  Resp:  [18] 18  BP: (110-138)/(66-88) 110/66    Physical Exam:   Physical Exam   Constitutional: He is oriented to person, place, and time. He appears well-developed and well-nourished.  Non-toxic appearance. He does not have a sickly appearance. He does not appear ill. No distress.   HENT:   Head: Normocephalic and atraumatic.   Right Ear: Hearing normal.   Left Ear: Hearing normal.   Mouth/Throat: Mucous membranes are normal.   Eyes: Conjunctivae and EOM are normal. Pupils are equal, round, and reactive to light.   Neck: Trachea normal and full passive range of motion without pain. Neck supple.   Cardiovascular: Normal rate and regular rhythm.   Pulmonary/Chest: Effort normal. No accessory muscle usage. No apnea, no tachypnea and no bradypnea. No respiratory distress.   Abdominal: Soft. Normal appearance.   Neurological: He is alert and oriented to person, place, and time.   Skin: Skin is warm, dry and intact.   Psychiatric: He has a normal mood and affect. His speech is normal and behavior is normal.   Nursing note and vitals reviewed.     Neurologic Exam     Mental Status   Oriented to person, place, and time.   Speech: speech is normal   Level of consciousness: alert    Cranial Nerves     CN III, IV, VI   Pupils are equal, round, and reactive to light.  Extraocular motions are normal.     CN V   Facial sensation intact.     CN VII   Facial expression full, symmetric.     Motor Exam   Right arm pronator drift: absent  Left arm pronator drift: absent    Strength   Right deltoid: 5/5  Left deltoid: 5/5  Right biceps:  5/5  Left biceps: 5/5  Right triceps: 5/5  Left triceps: 5/5  Right iliopsoas: 5/5  Left iliopsoas: 5/5  Right quadriceps: 5/5  Left quadriceps: 5/5  Right gastroc: 5/5  Left gastroc: 5/5    Sensory Exam   Light touch normal.       Discharge Disposition  Home or Self Care    Discharge Medications     Discharge Medications      New Medications      Instructions Start Date   amoxicillin-clavulanate 600-42.9 MG/5ML suspension  Commonly known as:  AUGMENTIN ES-600   1,200 mg, Oral, Every 8 Hours      dexamethasone 1 MG tablet  Commonly known as:  DECADRON   4 mg PO BID x 2 days; 2 mg PO BID x 2 days; 1 mg PO BID x 2 days; 0.5 mg PO BID x 2 days.         Continue These Medications      Instructions Start Date   amitriptyline 50 MG tablet  Commonly known as:  ELAVIL   75 mg, Oral, Nightly      carBAMazepine 200 MG tablet  Commonly known as:  TEGRETOL   400 mg, Oral, 2 Times Daily      hydrALAZINE 10 MG tablet  Commonly known as:  APRESOLINE   10 mg, Oral, Daily      hydrochlorothiazide 25 MG tablet  Commonly known as:  HYDRODIURIL   25 mg, Oral, Daily           Discharge Diet:   Diet Instructions     Advance Diet As Tolerated           Activity at Discharge:   Activity Instructions     Activity as Tolerated           Follow-up Appointments  Future Appointments   Date Time Provider Department Center   8/9/2019  8:40 AM Viviane Glasgow APRN [facility] None     Additional Instructions for the Follow-ups that You Need to Schedule     Discharge Follow-up with Specified Provider: Dr. Anguiano; 2 Weeks   As directed      To:  Dr. Anguiano    Follow Up:  2 Weeks    Follow Up Details:  with Silvio             Test Results Pending at Discharge   Order Current Status    Blood Culture - Blood, Arm, Right Preliminary result    Blood Culture - Blood, Hand, Right Preliminary result        ROBERT Martinez  05/14/19  9:25 AM    Time: Discharge 35 min

## 2019-05-14 NOTE — DISCHARGE INSTRUCTIONS
[facility] Neurosurgery    Dear Patient,  You recently were admitted to the hospital for headaches and are now ready to go home. These written instructions are intended to help you to recover quickly.  • If you have ANY QUESTIONS about your condition prior to discharge please ask Dr. Anguiano. In particular, if you have concerns about going home discuss them now. We do not want you to go until you are completely comfortable leaving the hospital.   • If you have ANY QUESTIONS about your condition after you go home call your doctor. The number is [phone] which is answered 24 hours a day. During regular working hours a  will connect you to your doctor, one of his partners, or one of our nurses. At night or on weekends the answering service will connect you with the physicianon call. DO NOT HESITATE to call. We want to help you with any problems.     Seizures  Anyone who has brain injury has a small risk of seizures. Seizures may involve involuntary shaking of the arms and legs, loss of consciousness, loss of urinary or bowel control. They typically last a few minutes, then the patient returns to normal. Seizures are frightening to watch, but usually resolve without long-term problems. Your doctor will often attempt to prevent seizures after surgery by putting you on anti-seizure medicine like Dilantin or Keppra. Sometimes seizures occur even when these medicines are used  What to do if a seizure occurs:  • If a seizure occurs and the patient does not return to normal in 10 minutes, call your Dr. Anguiano or go to the local emergency room.   • If multiple seizures occur, call 911.     Neurological Deficit  Neurological deficits are problems with brain function like speech difficulty, weakness, numbness, imbalance, etc. These deficits may be present before or after brain injury. Prior to discharge Dr. Anguiano will make sure that all treatment needed to help you recover from such deficits has been  instituted. He will also make sure that these deficits are stable or improving. After you go home, if you think any of your brain problems are getting worse, not better, it may be a sign of bleeding, infection, or other problems. Call Dr. Anguiano. He will order tests and prescribe treatment as needed.    Deep vein thrombosis/ pulmonary embolus  Some patients who are admitted to the hospital develop blood clots in the veins of the legs. These are caused by decreased walking and laying in bed.  These clots can cause pain or swelling in the legs, or may cause no obvious problem. They can break free from the legs and travel to the lungs causing shortness of breath and/or chest pain. If you develop pain or swelling in your legs after surgery, call your doctor. If you develop breathing problems or chest pain after surgery, call 911.    Medication  It is important to take your medication EXACTLY as prescribed. Some patients are reluctant to take pain medication. It is perfectly fine to take pain medication for several weeks after injury. We want to eliminate pain whenever possible. Many pain medications can cause nausea (sick to your stomach), constipation (inability to poop), or itching. Nausea may be minimized by taking the medication with food. Constipation can be relieved by taking stool softeners and/ or laxatives that you can purchase over the counter as needed. Some medications, like steroids or seizure medications, should not be stopped abruptly. They need to be weaned off over time. For instructions on weaning schedules call your doctor. Sometimes patients develop an allergy to a medication that was started during hospitalization. Most frequently an allergy will cause an itchy rash. Call your doctor if you think you might be having an allergic reaction.    Hydrocephalus  Your brain manufactures about one quart of clear fluid (called cerebrospinal fluid or CSF) every day. Normally this fluid is reabsorbed into  the blood stream. After brain injury the flow of fluid and the reabsorption process may be impaired. This leads to a buildup of water on the brain that is called hydrocephalus. Hydrocephalus can cause headache, somnolence, difficulty thinking, imbalance and loss of urinary control. If you think that the patient may have hydrocephalus, call your doctor. She/He will order a brain scan. If it shows water buildup a simple operation called a shunt may be needed to fix the problem.    How to contact your doctor  The neurosurgeon, Dr. Anguiano, and her/his team did your surgery and, therefore, are likely to know more about your condition than any other physicians. We are immediately available to help you with any problems after surgery. Please call us for any concerns at the following numbers:   • Doctor’s office [phone] (answered 24 hours a day)   • [facility]'s  [phone] (alternative emergency number for on-call neurosurgeon)     Specific instructions related to your surgery  Diet: no restrictions, eat a heart healthy diet.   Activity: as tolerated, no heavy lifting or strenuous exercise for at least 2 weeks.  ?  Decadron (dexamethasone): You are being prescribed a steroid to reduce brain swelling called decadron. Please take Pepcid or Zantac while on decadron to prevent gastric irritation and a possible bleeding stomach ulcer. Decadron can also cause your blood glucose (blood sugar) to be elevated. If you normally have problems with high blood glucose or diabetes than please continue to check your levels regularly or follow up with your primary care physician. Decadron can cause rapid weight gain, muscle loss or weakness, depression, and pancreatitis (acute onset of severe stomach pain with nausea and vomitting).     You have been placed on a steroid taper. This medication is intended to help alleviate swelling within your brain. It is important that you take the medication as prescribed  in the taper. While you are on steroid medications it is important that you are on an anti-acid (e.g. pepcid) to prevent a GI ulcer. Steroids tend to raise your blood sugar levels. Therefore, you should follow-up with your primary care physician within one week following discharge to ensure that your glucose levels are not elevated to a harmful extent.    Follow up:   You should call as soon as possible for follow up with Dr. Anguiano in the neurosurgery clinic ([phone]) in 4-6 weeks.

## 2019-05-14 NOTE — PROGRESS NOTES
"Infectious Diseases Progress Note    Patient:  Chirag Mata  YOB: 1975  MRN: [id number]   Admit date: 5/9/2019   Admitting Physician: Jose Enrique Anguiano, *  Primary Care Physician: Haider Santacruz PA-C    Chief Complaint/Interval History: Patient was seen and examined the day of discharge. Note initiated but inadvertently not completed. He was feeling better. He felt ready for discharge. No headache, visual symptoms, or localizing neurological complaints. Discussed with neurosurgery. They are comfortable with plan for discharge home. They plan coordinated surgery with ENT in 2 weeks. Abx for cover sinus organisms recommended in the interim.     Intake/Output Summary (Last 24 hours) at 5/14/2019 0649  Last data filed at 5/13/2019 1205  Gross per 24 hour   Intake 350 ml   Output --   Net 350 ml     Allergies:   Allergies   Allergen Reactions   • Bactrim [Sulfamethoxazole-Trimethoprim] Other (See Comments)     SHAKES VOMITTING       Current Scheduled Medications:     amitriptyline 50 mg Oral Nightly   amoxicillin-clavulanate 1,200 mg Oral Q8H   carBAMazepine 400 mg Oral BID   dexamethasone 4 mg Oral Q6H   hydrALAZINE 10 mg Oral Daily   hydrochlorothiazide 25 mg Oral Daily   insulin lispro 2-7 Units Subcutaneous 4x Daily With Meals & Nightly   pantoprazole 40 mg Oral Q AM   sodium chloride 3 mL Intravenous Q12H     Current PRN Medications:  •  acetaminophen  •  dextrose  •  dextrose  •  glucagon (human recombinant)  •  ketorolac  •  ondansetron  •  prochlorperazine  •  sodium chloride  •  sodium chloride  •  traMADol    Review of Systems No nausea, diarrhea, rash, or skin itching.    Vital Signs:  /66 (BP Location: Right arm, Patient Position: Lying)   Pulse 82   Temp 97.5 °F (36.4 °C) (Oral)   Resp 18   Ht 188 cm (74\")   Wt 117 kg (259 lb)   SpO2 100%   BMI 33.25 kg/m²     Physical Exam  Vital signs - reviewed.  Line/IV site - No erythema, warmth, induration, or tenderness.  No " sinus tenderness.  Cranial nerves 2-12 intact.  Non focal neurological examination.    Lab Results:  CBC: Results from last 7 days   Lab Units 05/14/19  0526 05/13/19  0219 05/12/19  0618 05/11/19  0526 05/10/19  0837 05/10/19  0454 05/09/19  2205   WBC 10*3/mm3 7.36 7.07 5.51 5.46 4.48* 5.42 5.79   HEMOGLOBIN g/dL 12.7* 12.1* 12.5* 12.8* 12.7* 12.0* 12.4*   HEMATOCRIT % 36.0* 34.2* 35.5* 37.4* 36.1* 34.7* 35.4*   PLATELETS 10*3/mm3 314 277 269 270 253 256 244     BMP:  Results from last 7 days   Lab Units 05/14/19 0526 05/13/19 0219 05/12/19 0618 05/11/19  0526 05/10/19  0837 05/09/19  2205   SODIUM mmol/L 131* 134* 133* 134* 134* 132*   POTASSIUM mmol/L 4.3 4.3 4.7 4.7 3.9 3.8   CHLORIDE mmol/L 94* 98 97* 95* 95* 91*   CO2 mmol/L 30.0 28.0 28.0 31.0 30.0 33.0*   BUN mg/dL 12 12 12 13 10 11   CREATININE mg/dL 0.86 0.94 0.94 1.12 0.98 1.01   GLUCOSE mg/dL 110* 126* 103* 95 104* 102*   CALCIUM mg/dL 8.8 8.8 8.8 9.3 9.4 9.3   ALT (SGPT) U/L  --   --   --   --   --  22     Culture Results:   Blood Culture   Date Value Ref Range Status   05/10/2019 No growth at 3 days  Preliminary   05/10/2019 No growth at 3 days  Preliminary     Radiology: None  Additional Studies Reviewed: None    Impression:   1.  Hypercellular mass in sphenoid and ethmoid sinuses.  Per chart review differential diagnosis including recurrent tumor, mucocele, or ethmoid infection.  He appears stable for discharge with plan for surgery in 2 weeks.  He has not had fever. Non focal neurological examination.    Asked to provide recommendations for oral suppressive antibiotics prior to surgery.    2.  History bilateral meningiomas that have been resected  3.  He indicates he is currently at [facility] for opiate use treatment  4.  Hypertension    Recommendations:   Per chart review plan for coordinated surgery between neurosurgery and ENT in 2 weeks.  Suggest augmentin ES 10 ml po q 8 hours for 2 weeks  Would be happy to see when he is re-admitted for  surgery  He is otherwise planning follow up with NSG and ENT and with ID prn    Luis Espinosa MD

## 2019-05-15 LAB
BACTERIA SPEC AEROBE CULT: NORMAL
BACTERIA SPEC AEROBE CULT: NORMAL

## 2019-05-15 NOTE — PAYOR COMM NOTE
"[facility]  [place]  [phone]  OR  FAX  [phone]    Chirag Mata (43 y.o. Male)     Date of Birth Social Security Number Address Home Phone MRN    1975  [address] [place] [id number] [phone] [id number]    Judaism Marital Status          [employer] Single       Admission Date Admission Type Admitting Provider Attending Provider Department, Room/Bed    5/9/19 Emergency Moise Crawley MD  [facility] 3A, 329/1    Discharge Date Discharge Disposition Discharge Destination        5/14/2019 Home or Self Care              Attending Provider:  (none)   Allergies:  Bactrim [Sulfamethoxazole-trimethoprim]    Isolation:  None   Infection:  None   Code Status:  Prior    Ht:  188 cm (74\")   Wt:  117 kg (259 lb)    Admission Cmt:  None   Principal Problem:  None                Active Insurance as of 5/9/2019     Primary Coverage     Payor Plan Insurance Group Employer/Plan Group    [employer] [place] [employer]ICA[employer]      Payor Plan Address Payor Plan Phone Number Payor Plan Fax Number Effective Dates     [address] [address] [phone]  10/15/2018 - None Entered    [facility] [id number]       Subscriber Name Subscriber Birth Date Member ID       CHIRAG MATA 1975 [id number]                 Emergency Contacts      (Rel.) Home Phone Work Phone Mobile Phone    Carmen Walton (Significant Other) -- -- [phone]               Discharge Summary      Efren Joseph, APRN at 5/14/2019  9:23 AM          Date of Discharge:  5/14/2019    Discharge Diagnosis:    non-intractable headache  Intracranial mass  History of resection of meningioma  History of seizure disorder    Patient Active Problem List   Diagnosis   • Seizure (CMS/HCC)   • History of resection of meningioma   • Chronic intractable headache   • MARIA LUISA on CPAP   • Nonintractable headache     1. Nonintractable headache, unspecified chronicity pattern, unspecified headache type    2. Intracranial mass  "     Presenting Problem/History of Present Illness  Nonintractable headache, unspecified chronicity pattern, unspecified headache type [R51]   1. Nonintractable headache, unspecified chronicity pattern, unspecified headache type    2. Intracranial mass      Hospital Course  Patient is a 43 y.o. male presented with significant comorbidity bifrontal craniotomy resection in 2015 by Abisai Harmon and seizures.  He presents with a new problem of intractable headache. Physical exam findings of neurologically intact.  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    Mr. Mata has been kept in the hospital for close neurologic monitoring and pain control.  Since admission, his neurologic status has remained unchanged and he has felt a significant improvement in his headaches.  He  has been able to tolerate oral diet, oral pain medications, and void.  He has been evaluated by physical therapy and occupational therapy and deemed safe for discharge home with family.  Additional instructions and follow-up appointments provided.    Procedures Performed: NA       Consults:   Consults     Date and Time Order Name Status Description    5/12/2019 1843 Inpatient Infectious Diseases Consult Completed     5/10/2019 1034 Inpatient Neurosurgery Consult Completed     5/10/2019 0848 Inpatient ENT Consult Completed         Pertinent Test Results: Ct Head Without Contrast    Result Date: 5/10/2019  1. Mass in the floor of the anterior cranial fossa extending through the floor of the frontal cranial fossa and into the sphenoid/ethmoid sinus region. This is predominantly soft tissue density and may represent recurrent tumor. Infection is also in the differential. The patient's previous diagnosis is meningioma. 2. Pre-existing encephalomalacia/gliosis in the anterior and inferior frontal lobes.  This report was finalized on 05/10/2019 08:43 by Dr. Josef Hung MD.    Mri Brain With  & Without Contrast    Result Date: 5/10/2019  1. Large hypercellular mass in the sphenoid and ethmoid sinuses extending superiorly through to the floor of the anterior cranial fossa. Imaging characteristics suggest a hypercellular neoplasm, recurrent meningioma considered. 2. Postsurgical changes frontal lobes. 3. Cerebellar tonsillar ectopia slightly more conspicuous compared to the previous study. These findings suggest the possibility of increased intracranial pressure. This report was finalized on 05/10/2019 16:17 by Dr. Nabil Blanchard MD.    Ct Sinus Without Contrast    Result Date: 5/11/2019  1. Redemonstration of anterior cranial fossa mass with anatomic considerations described in detail above. 2. Bifrontal hypodensity with ex vacuo dilation of the anterior horns of the lateral ventricles, likely encephalomalacia. This report was finalized on 05/11/2019 00:36 by Dr Sonya Comer MD.    Ct Venogram Head W Contrast    Result Date: 5/10/2019  1. Suggestion of recurrent mass along the floor of the frontal cranial fossa extending through the floor and into the sphenoid and ethmoid sinus region. There is enhancement of the mass with scattered areas of low density. Infection is in the differential, including fungal infection. Given prior history of meningioma, this may represent recurrent meningioma, although the appearance is somewhat atypical. Meningioma typically enhances homogeneously. Other tumors cannot be excluded. 2. No major arterial branch occlusions. The venous sinuses are patent but somewhat small in caliber. There is also a partially empty appearance of the sella turcica.  The findings were discussed with Dr. Crawley. This report was finalized on 05/10/2019 08:42 by Dr. Josef Hung MD.    Condition on Discharge:  Stable     Vital Signs  Temp:  [97.5 °F (36.4 °C)-98.8 °F (37.1 °C)] 97.5 °F (36.4 °C)  Heart Rate:  [82-92] 82  Resp:  [18] 18  BP: (110-138)/(66-88) 110/66    Physical Exam:   Physical  Exam   Constitutional: He is oriented to person, place, and time. He appears well-developed and well-nourished.  Non-toxic appearance. He does not have a sickly appearance. He does not appear ill. No distress.   HENT:   Head: Normocephalic and atraumatic.   Right Ear: Hearing normal.   Left Ear: Hearing normal.   Mouth/Throat: Mucous membranes are normal.   Eyes: Conjunctivae and EOM are normal. Pupils are equal, round, and reactive to light.   Neck: Trachea normal and full passive range of motion without pain. Neck supple.   Cardiovascular: Normal rate and regular rhythm.   Pulmonary/Chest: Effort normal. No accessory muscle usage. No apnea, no tachypnea and no bradypnea. No respiratory distress.   Abdominal: Soft. Normal appearance.   Neurological: He is alert and oriented to person, place, and time.   Skin: Skin is warm, dry and intact.   Psychiatric: He has a normal mood and affect. His speech is normal and behavior is normal.   Nursing note and vitals reviewed.     Neurologic Exam     Mental Status   Oriented to person, place, and time.   Speech: speech is normal   Level of consciousness: alert    Cranial Nerves     CN III, IV, VI   Pupils are equal, round, and reactive to light.  Extraocular motions are normal.     CN V   Facial sensation intact.     CN VII   Facial expression full, symmetric.     Motor Exam   Right arm pronator drift: absent  Left arm pronator drift: absent    Strength   Right deltoid: 5/5  Left deltoid: 5/5  Right biceps: 5/5  Left biceps: 5/5  Right triceps: 5/5  Left triceps: 5/5  Right iliopsoas: 5/5  Left iliopsoas: 5/5  Right quadriceps: 5/5  Left quadriceps: 5/5  Right gastroc: 5/5  Left gastroc: 5/5    Sensory Exam   Light touch normal.       Discharge Disposition  Home or Self Care    Discharge Medications     Discharge Medications      New Medications      Instructions Start Date   amoxicillin-clavulanate 600-42.9 MG/5ML suspension  Commonly known as:  AUGMENTIN ES-600   1,200 mg,  Oral, Every 8 Hours      dexamethasone 1 MG tablet  Commonly known as:  DECADRON   4 mg PO BID x 2 days; 2 mg PO BID x 2 days; 1 mg PO BID x 2 days; 0.5 mg PO BID x 2 days.         Continue These Medications      Instructions Start Date   amitriptyline 50 MG tablet  Commonly known as:  ELAVIL   75 mg, Oral, Nightly      carBAMazepine 200 MG tablet  Commonly known as:  TEGRETOL   400 mg, Oral, 2 Times Daily      hydrALAZINE 10 MG tablet  Commonly known as:  APRESOLINE   10 mg, Oral, Daily      hydrochlorothiazide 25 MG tablet  Commonly known as:  HYDRODIURIL   25 mg, Oral, Daily           Discharge Diet:   Diet Instructions     Advance Diet As Tolerated           Activity at Discharge:   Activity Instructions     Activity as Tolerated           Follow-up Appointments  Future Appointments   Date Time Provider Department Center   8/9/2019  8:40 AM Viviane Glasgow APRN [facility] None     Additional Instructions for the Follow-ups that You Need to Schedule     Discharge Follow-up with Specified Provider: Dr. Anguiano; 2 Weeks   As directed      To:  Dr. Anguiano    Follow Up:  2 Weeks    Follow Up Details:  with Silvio             Test Results Pending at Discharge   Order Current Status    Blood Culture - Blood, Arm, Right Preliminary result    Blood Culture - Blood, Hand, Right Preliminary result        ROBERT Martinez  05/14/19  9:25 AM    Time: Discharge 35 min      Electronically signed by Efren Joseph APRN at 5/14/2019  9:32 AM

## 2019-05-20 RX ORDER — BUPIVACAINE HCL/0.9 % NACL/PF 0.1 %
2 PLASTIC BAG, INJECTION (ML) EPIDURAL ONCE
Status: CANCELLED | OUTPATIENT
Start: 2019-05-20 | End: 2019-05-20

## 2019-05-21 PROBLEM — R90.0 INTRACRANIAL MASS: Status: ACTIVE | Noted: 2019-05-21

## 2019-05-24 ENCOUNTER — APPOINTMENT (OUTPATIENT)
Dept: PREADMISSION TESTING | Facility: HOSPITAL | Age: 44
End: 2019-05-24

## 2019-05-24 ENCOUNTER — OFFICE VISIT (OUTPATIENT)
Dept: NEUROSURGERY | Facility: CLINIC | Age: 44
End: 2019-05-24

## 2019-05-24 VITALS
BODY MASS INDEX: 33.92 KG/M2 | SYSTOLIC BLOOD PRESSURE: 147 MMHG | DIASTOLIC BLOOD PRESSURE: 88 MMHG | WEIGHT: 264.33 LBS | OXYGEN SATURATION: 97 % | RESPIRATION RATE: 20 BRPM | HEIGHT: 74 IN | HEART RATE: 100 BPM

## 2019-05-24 DIAGNOSIS — R90.0 INTRACRANIAL MASS: Primary | ICD-10-CM

## 2019-05-24 DIAGNOSIS — R90.0 INTRACRANIAL MASS: ICD-10-CM

## 2019-05-24 DIAGNOSIS — Z98.890 HISTORY OF RESECTION OF MENINGIOMA: ICD-10-CM

## 2019-05-24 DIAGNOSIS — R51.9 CHRONIC INTRACTABLE HEADACHE, UNSPECIFIED HEADACHE TYPE: ICD-10-CM

## 2019-05-24 DIAGNOSIS — G89.29 CHRONIC INTRACTABLE HEADACHE, UNSPECIFIED HEADACHE TYPE: ICD-10-CM

## 2019-05-24 DIAGNOSIS — Z86.03 HISTORY OF RESECTION OF MENINGIOMA: ICD-10-CM

## 2019-05-24 LAB
ALBUMIN SERPL-MCNC: 4.3 G/DL (ref 3.5–5)
ALBUMIN/GLOB SERPL: 1.3 G/DL (ref 1.1–2.5)
ALP SERPL-CCNC: 95 U/L (ref 24–120)
ALT SERPL W P-5'-P-CCNC: 34 U/L (ref 0–54)
ANION GAP SERPL CALCULATED.3IONS-SCNC: 10 MMOL/L (ref 4–13)
AST SERPL-CCNC: 32 U/L (ref 7–45)
BILIRUB SERPL-MCNC: 0.4 MG/DL (ref 0.1–1)
BUN BLD-MCNC: 14 MG/DL (ref 5–21)
BUN/CREAT SERPL: 13 (ref 7–25)
CALCIUM SPEC-SCNC: 8.9 MG/DL (ref 8.4–10.4)
CHLORIDE SERPL-SCNC: 93 MMOL/L (ref 98–110)
CO2 SERPL-SCNC: 28 MMOL/L (ref 24–31)
CREAT BLD-MCNC: 1.08 MG/DL (ref 0.5–1.4)
DEPRECATED RDW RBC AUTO: 44.7 FL (ref 40–54)
ERYTHROCYTE [DISTWIDTH] IN BLOOD BY AUTOMATED COUNT: 13.3 % (ref 12–15)
GFR SERPL CREATININE-BSD FRML MDRD: 90 ML/MIN/1.73
GLOBULIN UR ELPH-MCNC: 3.2 GM/DL
GLUCOSE BLD-MCNC: 137 MG/DL (ref 70–100)
HCT VFR BLD AUTO: 38.6 % (ref 40–52)
HGB BLD-MCNC: 13.4 G/DL (ref 14–18)
LYMPHOCYTES # BLD MANUAL: 2.4 10*3/MM3 (ref 0.72–4.86)
LYMPHOCYTES NFR BLD MANUAL: 2.1 % (ref 4–12)
LYMPHOCYTES NFR BLD MANUAL: 43.2 % (ref 15–45)
MCH RBC QN AUTO: 31.8 PG (ref 28–32)
MCHC RBC AUTO-ENTMCNC: 34.7 G/DL (ref 33–36)
MCV RBC AUTO: 91.7 FL (ref 82–95)
MONOCYTES # BLD AUTO: 0.12 10*3/MM3 (ref 0.19–1.3)
NEUTROPHILS # BLD AUTO: 2.92 10*3/MM3 (ref 1.87–8.4)
NEUTROPHILS NFR BLD MANUAL: 52.6 % (ref 39–78)
PLAT MORPH BLD: NORMAL
PLATELET # BLD AUTO: 356 10*3/MM3 (ref 130–400)
PMV BLD AUTO: 8.4 FL (ref 6–12)
POTASSIUM BLD-SCNC: 4.3 MMOL/L (ref 3.5–5.3)
PROT SERPL-MCNC: 7.5 G/DL (ref 6.3–8.7)
RBC # BLD AUTO: 4.21 10*6/MM3 (ref 4.8–5.9)
RBC MORPH BLD: NORMAL
SODIUM BLD-SCNC: 131 MMOL/L (ref 135–145)
VARIANT LYMPHS NFR BLD MANUAL: 2.1 % (ref 0–5)
WBC MORPH BLD: NORMAL
WBC NRBC COR # BLD: 5.56 10*3/MM3 (ref 4.8–10.8)

## 2019-05-24 PROCEDURE — 93005 ELECTROCARDIOGRAM TRACING: CPT

## 2019-05-24 PROCEDURE — 85025 COMPLETE CBC W/AUTO DIFF WBC: CPT | Performed by: NEUROLOGICAL SURGERY

## 2019-05-24 PROCEDURE — 99215 OFFICE O/P EST HI 40 MIN: CPT | Performed by: NEUROLOGICAL SURGERY

## 2019-05-24 PROCEDURE — 93010 ELECTROCARDIOGRAM REPORT: CPT | Performed by: INTERNAL MEDICINE

## 2019-05-24 PROCEDURE — 85007 BL SMEAR W/DIFF WBC COUNT: CPT | Performed by: NEUROLOGICAL SURGERY

## 2019-05-24 PROCEDURE — 36415 COLL VENOUS BLD VENIPUNCTURE: CPT

## 2019-05-24 PROCEDURE — 80053 COMPREHEN METABOLIC PANEL: CPT | Performed by: NEUROLOGICAL SURGERY

## 2019-05-24 NOTE — DISCHARGE INSTRUCTIONS
DAY OF SURGERY INSTRUCTIONS        YOUR SURGEON: ***ALPHONSE STANLEY     PROCEDURE: ***CRANIOTOMY ENDOSCOPIC WITH BRAIN LABS, BICORNAL  INCISION, SUBFRONTAL CRANIOTOMY CRANIOTOMY, ENDOSCOPES, IN-CONJUNCTION WITH TRANSNASAL APPROACH WITH DR MARTINEZ, TO ICU     DATE OF SURGERY: ***MAY 28, 2019    ARRIVAL TIME: AS DIRECTED BY OFFICE    YOU MAY TAKE THE FOLLOWING MEDICATION(S) THE MORNING OF SURGERY WITH A SIP OF WATER: ***TEGRETOL     ALL OTHER HOME MEDICATIONS CHECK WITH YOUR DOCTOR              MANAGING PAIN AFTER SURGERY    We know you are probably wondering what your pain will be like after surgery.  Following surgery it is unrealistic to expect you will not have pain.   Pain is how our bodies let us know that something is wrong or cautions us to be careful.  That said, our goal is to make your pain tolerable.    Methods we may use to treat your pain include (oral or IV medications, PCAs, epidurals, nerve blocks, etc.)   While some procedures require IV pain medications for a short time after surgery, transitioning to pain medications by mouth allows for better management of pain.   Your nurse will encourage you to take oral pain medications whenever possible.  IV medications work almost immediately, but only last a short while.  Taking medications by mouth allows for a more constant level of medication in your blood stream for a longer period of time.      Once your pain is out of control it is harder to get back under control.  It is important you are aware when your next dose of pain medication is due.  If you are admitted, your nurse may write the time of your next dose on the white board in your room to help you remember.      We are interested in your pain and encourage you to inform us about aggravating factors during your visit.   Many times a simple repositioning every few hours can make a big difference.    If your physician says it is okay, do not let your pain prevent you from getting out of bed. Be  sure to call your nurse for assistance prior to getting up so you do not fall.      Before surgery, please decide your tolerable pain goal.  These faces help describe the pain ratings we use on a 0-10 scale.   Be prepared to tell us your goal and whether or not you take pain or anxiety medications at home.            BEFORE YOU COME TO THE HOSPITAL  (Pre-op instructions)  • Do not eat, drink, smoke or chew gum after midnight the night before surgery.  This also includes no mints.  • Morning of surgery take only the medicines you have been instructed with a sip of water unless otherwise instructed  by your physician.  • Do not shave, wear makeup or dark nail polish.  • Remove all jewelry including rings.  • Leave anything you consider valuable at home.  • Leave your suitcase in the car until after your surgery.  • Bring the following with you if applicable:  o Picture ID and insurance, Medicare or Medicaid cards  o Co-pay/deductible required by insurance (cash, check, credit card)  o Copy of advance directive, living will or power-of- documents if not brought to PAT  o CPAP or BIPAP mask and tubing  o Relaxation aids (MP3 player, book, magazine)  • On the day of surgery check in at registration located at the main entrance of the hospital.       Outpatient Surgery Guidelines, Adult  Outpatient procedures are those for which the person having the procedure is allowed to go home the same day as the procedure. Various procedures are done on an outpatient basis. You should follow some general guidelines if you will be having an outpatient procedure.  LET YOUR HEALTH CARE PROVIDER KNOW ABOUT:  · Any allergies you have.  · All medicines you are taking, including vitamins, herbs, eye drops, creams, and over-the-counter medicines.  · Previous problems you or members of your family have had with the use of anesthetics.  · Any blood disorders you have.  · Previous surgeries you have had.  · Medical conditions you  have.  RISKS AND COMPLICATIONS  Your health care provider will discuss possible risks and complications with you before surgery. Common risks and complications include:    · Problems due to the use of anesthetics.  · Blood loss and replacement (does not apply to minor surgical procedures).  · Temporary increase in pain due to surgery.  · Uncorrected pain or problems that the surgery was meant to correct.  · Infection.  · New damage.  BEFORE THE PROCEDURE  · Ask your health care provider about changing or stopping your regular medicines. You may need to stop taking certain medicines in the days or weeks before the procedure.  · Stop smoking at least 2 weeks before surgery. This lowers your risk for complications during and after surgery. Ask your health care provider for help with this if needed.  · Eat your usual meals and a light supper the day before surgery. Continue fluid intake. Do not drink alcohol.  · Do not eat or drink after midnight the night before your surgery.   · Arrange for someone to take you home and to stay with you for 24 hours after the procedure. Medicine given for your procedure may affect your ability to drive or to care for yourself.  · Call your health care provider's office if you develop an illness or problem that may prevent you from safely having your procedure.  AFTER THE PROCEDURE  After surgery, you will be taken to a recovery area, where your progress will be monitored. If there are no complications, you will be allowed to go home when you are awake, stable, and taking fluids well. You may have numbness around the surgical site. Healing will take some time. You will have tenderness at the surgical site and may have some swelling and bruising. You may also have some nausea.  HOME CARE INSTRUCTIONS  · Do not drive for 24 hours, or as directed by your health care provider. Do not drive while taking prescription pain medicines.  · Do not drink alcohol for 24 hours.  · Do not make  important decisions or sign legal documents for 24 hours.  · You may resume a normal diet and activities as directed.  · Do not lift anything heavier than 10 pounds (4.5 kg) or play contact sports until your health care provider says it is okay.  · Change your bandages (dressings) as directed.  · Only take over-the-counter or prescription medicines as directed by your health care provider.  · Follow up with your health care provider as directed.  SEEK MEDICAL CARE IF:  · You have increased bleeding (more than a small spot) from the surgical site.  · You have redness, swelling, or increasing pain in the wound.  · You see pus coming from the wound.  · You have a fever.  · You notice a bad smell coming from the wound or dressing.  · You feel lightheaded or faint.  · You develop a rash.  · You have trouble breathing.  · You develop allergies.  MAKE SURE YOU:  · Understand these instructions.  · Will watch your condition.  · Will get help right away if you are not doing well or get worse.     This information is not intended to replace advice given to you by your health care provider. Make sure you discuss any questions you have with your health care provider.     Document Released: 09/12/2002 Document Revised: 05/03/2016 Document Reviewed: 05/22/2014  [employer] Interactive Patient Education ©2016 [employer]       Fall Prevention in Hospitals, Adult  As a hospital patient, your condition and the treatments you receive can increase your risk for falls. Some additional risk factors for falls in a hospital include:  · Being in an unfamiliar environment.  · Being on bed rest.  · Your surgery.  · Taking certain medicines.  · Your tubing requirements, such as intravenous (IV) therapy or catheters.  It is important that you learn how to decrease fall risks while at the hospital. Below are important tips that can help prevent falls.  SAFETY TIPS FOR PREVENTING FALLS  Talk about your risk of falling.  · Ask your health care  provider why you are at risk for falling. Is it your medicine, illness, tubing placement, or something else?  · Make a plan with your health care provider to keep you safe from falls.  · Ask your health care provider or pharmacist about side effects of your medicines. Some medicines can make you dizzy or affect your coordination.  Ask for help.  · Ask for help before getting out of bed. You may need to press your call button.  · Ask for assistance in getting safely to the toilet.  · Ask for a walker or cane to be put at your bedside. Ask that most of the side rails on your bed be placed up before your health care provider leaves the room.  · Ask family or friends to sit with you.  · Ask for things that are out of your reach, such as your glasses, hearing aids, telephone, bedside table, or call button.  Follow these tips to avoid falling:  · Stay lying or seated, rather than standing, while waiting for help.  · Wear rubber-soled slippers or shoes whenever you walk in the hospital.  · Avoid quick, sudden movements.  ¨ Change positions slowly.  ¨ Sit on the side of your bed before standing.  ¨ Stand up slowly and wait before you start to walk.  · Let your health care provider know if there is a spill on the floor.  · Pay careful attention to the medical equipment, electrical cords, and tubes around you.  · When you need help, use your call button by your bed or in the bathroom. Wait for one of your health care providers to help you.  · If you feel dizzy or unsure of your footing, return to bed and wait for assistance.  · Avoid being distracted by the TV, telephone, or another person in your room.  · Do not lean or support yourself on rolling objects, such as IV poles or bedside tables.     This information is not intended to replace advice given to you by your health care provider. Make sure you discuss any questions you have with your health care provider.     Document Released: 12/15/2001 Document Revised: 01/08/2016  Document Reviewed: 08/25/2013  [employer] Interactive Patient Education ©2016 [employer]       Surgical Site Infections FAQs  What is a Surgical Site Infection (SSI)?  A surgical site infection is an infection that occurs after surgery in the part of the body where the surgery took place. Most patients who have surgery do not develop an infection. However, infections develop in about 1 to 3 out of every 100 patients who have surgery.  Some of the common symptoms of a surgical site infection are:  · Redness and pain around the area where you had surgery  · Drainage of cloudy fluid from your surgical wound  · Fever  Can SSIs be treated?  Yes. Most surgical site infections can be treated with antibiotics. The antibiotic given to you depends on the bacteria (germs) causing the infection. Sometimes patients with SSIs also need another surgery to treat the infection.  What are some of the things that hospitals are doing to prevent SSIs?  To prevent SSIs, doctors, nurses, and other healthcare providers:  · Clean their hands and arms up to their elbows with an antiseptic agent just before the surgery.  · Clean their hands with soap and water or an alcohol-based hand rub before and after caring for each patient.  · May remove some of your hair immediately before your surgery using electric clippers if the hair is in the same area where the procedure will occur. They should not shave you with a razor.  · Wear special hair covers, masks, gowns, and gloves during surgery to keep the surgery area clean.  · Give you antibiotics before your surgery starts. In most cases, you should get antibiotics within 60 minutes before the surgery starts and the antibiotics should be stopped within 24 hours after surgery.  · Clean the skin at the site of your surgery with a special soap that kills germs.  What can I do to help prevent SSIs?  Before your surgery:  · Tell your doctor about other medical problems you may have. Health problems  such as allergies, diabetes, and obesity could affect your surgery and your treatment.  · Quit smoking. Patients who smoke get more infections. Talk to your doctor about how you can quit before your surgery.  · Do not shave near where you will have surgery. Shaving with a razor can irritate your skin and make it easier to develop an infection.  At the time of your surgery:  · Speak up if someone tries to shave you with a razor before surgery. Ask why you need to be shaved and talk with your surgeon if you have any concerns.  · Ask if you will get antibiotics before surgery.  After your surgery:  · Make sure that your healthcare providers clean their hands before examining you, either with soap and water or an alcohol-based hand rub.  · If you do not see your providers clean their hands, please ask them to do so.  · Family and friends who visit you should not touch the surgical wound or dressings.  · Family and friends should clean their hands with soap and water or an alcohol-based hand rub before and after visiting you. If you do not see them clean their hands, ask them to clean their hands.  What do I need to do when I go home from the hospital?  · Before you go home, your doctor or nurse should explain everything you need to know about taking care of your wound. Make sure you understand how to care for your wound before you leave the hospital.  · Always clean your hands before and after caring for your wound.  · Before you go home, make sure you know who to contact if you have questions or problems after you get home.  · If you have any symptoms of an infection, such as redness and pain at the surgery site, drainage, or fever, call your doctor immediately.  If you have additional questions, please ask your doctor or nurse.  Developed and co-sponsored by The Society for Healthcare Epidemiology of [place] (SHEA); Infectious Diseases Society of [place] (IDSA); American Hospital Association; Association for  Professionals in Infection Control and Epidemiology (APIC); Centers for Disease Control and Prevention (CDC); and The Joint Commission.     This information is not intended to replace advice given to you by your health care provider. Make sure you discuss any questions you have with your health care provider.     Document Released: 12/23/2014 Document Revised: 01/08/2016 Document Reviewed: 03/02/2016  [employer] Interactive Patient Education ©2016 [employer]     PATIENT/FAMILY/RESPONSIBLE PARTY VERBALIZES UNDERSTANDING OF ABOVE EDUCATION.  COPY OF PAIN SCALE GIVEN AND REVIEWED WITH VERBALIZED UNDERSTANDING.

## 2019-05-24 NOTE — PROGRESS NOTES
Chief complaint:   Chief Complaint   Patient presents with   • Follow-up     Scheduled for surgery on 05/28/19, patient here to finalize surgery plans.        Subjective     HPI:   From previous note: Patient is a 43 y.o. male presented with significant comorbidity bifrontal craniotomy resection in 2015 by Abisai Harmon and seizures.  He presents with a new problem of intractable headache. Physical exam findings of neurologically intact.  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  Elevated CRP and imaging characteristics were consistent with intracranial abscess of the ethmoid sinuses and continued bony erosion with almost complete removal of the anterior fossa.  After consultation with Dr. Vu it was determined and that a combined subfrontal and transnasal approach would be in the patient's best interest.  Therefore the patient was placed on a short course of IV antibiotics transition to oral antibiotics, and outpatient follow-up was arranged.    Interval History: Chirag Beard returns today from [place] to discuss surgical planning.  His outside imaging has been obtained but has not been reviewed yet.  He had many questions about the procedure.  He is not been having rhinorrhea.  His headaches have improved significantly and are now tolerable.  He continues on Decadron and a suppressive dose of oral antibiotics prescribed by infectious disease.  He denies any weakness numbness or tingling in any extremity.  He has no diplopia.    Review of Systems   Constitutional: Negative.    Eyes: Negative.    Respiratory: Negative.    Cardiovascular: Negative.    Gastrointestinal: Negative.    Endocrine: Negative.    Genitourinary: Negative.    Musculoskeletal: Negative.    Skin: Negative.    Allergic/Immunologic: Negative.    Neurological: Positive for headaches.   Hematological: Negative.    Psychiatric/Behavioral: Negative.        PFSH:  Past  Medical History:   Diagnosis Date   • Drug abuse (CMS/[facility])    • Hypertension        Past Surgical History:   Procedure Laterality Date   • TESTICLE TORSION REPAIR         Objective      Current Outpatient Medications   Medication Sig Dispense Refill   • amitriptyline (ELAVIL) 50 MG tablet Take 75 mg by mouth Every Night.     • amoxicillin-clavulanate (AUGMENTIN ES-600) 600-42.9 MG/5ML suspension Take 10 mL by mouth Every 8 (Eight) Hours for 10 days. 300 mL 0   • carBAMazepine (TEGRETOL) 200 MG tablet Take 2 tablets by mouth 2 (Two) Times a Day. 120 tablet 2   • dexamethasone (DECADRON) 1 MG tablet 4 mg PO BID x 2 days; 2 mg PO BID x 2 days; 1 mg PO BID x 2 days; 0.5 mg PO BID x 2 days. 30 tablet 0   • hydrALAZINE (APRESOLINE) 10 MG tablet Take 10 mg by mouth Daily.     • hydrochlorothiazide (HYDRODIURIL) 25 MG tablet Take 25 mg by mouth Daily.       No current facility-administered medications for this visit.        Vital Signs  There were no vitals taken for this visit.  Physical Exam   Constitutional: He is oriented to person, place, and time.   Eyes: EOM are normal. Pupils are equal, round, and reactive to light.   Neurological: He is oriented to person, place, and time. He has normal strength. Gait normal.   Psychiatric: His speech is normal.     Neurologic Exam     Mental Status   Oriented to person, place, and time.   Speech: speech is normal     Cranial Nerves     CN II   Visual fields full to confrontation.     CN III, IV, VI   Pupils are equal, round, and reactive to light.  Extraocular motions are normal.     CN V   Right facial sensation deficit: none  Left facial sensation deficit: none    CN VII   Facial expression full, symmetric.     CN VIII   Hearing: intact    CN IX, X   Palate: symmetric    CN XI   Right sternocleidomastoid strength: normal  Left sternocleidomastoid strength: normal    CN XII   Tongue deviation: none    Motor Exam     Strength   Strength 5/5 throughout.     Sensory Exam   Right arm  light touch: normal  Left arm light touch: normal    Gait, Coordination, and Reflexes     Gait  Gait: normal    Reflexes   Reflexes 2+ except as noted.     Well-healed bicoronal incision  (12 bullet pts)    Results Review:   A comparison was made of the noncontrast CT scan from the outside hospital in March 2015 2 think head CT performed in May 2019.  Comparison noncontrast CT shows similar expansion of the ethmoid air cells with asymmetric displacement to the left.  This is compared to the preoperative imaging.  Unfortunately there is no postoperative imaging.          MRI of the brain with and without contrast from 2015 which is preoperative compared to May 2019 which is postoperative.  There is evidence of contrast-enhancing mass in the ethmoid sinuses.  While originally thought to be abscess based on these imaging I am more concerned about recurrence of his meningioma.  If there is said rapid recurrence in 4 years this is more suggestive of atypical meningioma.                    Assessment/Plan: Chirag Mata is a 43 y.o. 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 He presents with a known problem of worsening headache. Physical exam findings of neurologically intact.  His imaging shows contrast-enhancing mass at the base of the previous resection cavity which is eroded to the ethmoid air cells.  While I initially thought this to be an abscess given his elevated CRP and response to antibiotics and steroids, after review of his preoperative imaging there was definite extension of the mass into this area.  This raises the concern for rapidly progressive recurrence.    I discussed with the family operative intervention which includes a planned subfrontal and combined transnasal skull base approach for biopsy and resection.  I think regardless of infection recurrent tumor this is  necessary.  We discussed the risk benefits and possible complications which included damage to the carotid, stroke, worsening seizure, persistent CSF leak, Lino defect, or worsening brain trauma.  There is also a likelihood of panhypopituitaryism given the proximity to the pituitary stalk and vision changes as we will be operating near the optic chiasm.    I would however if the patient has had recurrence from a previous subfrontal craniotomy then the likelihood of complete resection to the same approach is limited.  Therefore I would like to discuss with Dr. Vu surgical approaches prior to our Tuesday case.  If he feels that we cannot achieve adequate exposure through an expanded transnasal approach then I will refer him to a tertiary care center.    1. Intracranial mass    2. Chronic intractable headache, unspecified headache type    3. History of resection of meningioma    4.  Non-smoker  5.  Normal weight    Recommendations:  Chirag was seen today for follow-up.    Diagnoses and all orders for this visit:    Intracranial mass    Chronic intractable headache, unspecified headache type    History of resection of meningioma        Return for after surgery.    Level of Risk: High risk for OR  MDM: High Complexity  (Mod = 14695, High = 79549)    Thank you, for allowing me to continue to participate in the care of this patient.    Sincerely,  Jose Enrique Anguiano MD

## 2019-05-28 DIAGNOSIS — R90.0 INTRACRANIAL MASS: Primary | ICD-10-CM

## 2019-05-28 RX ORDER — DEXAMETHASONE 1 MG
1 TABLET ORAL
Qty: 30 TABLET | Refills: 0 | Status: SHIPPED | OUTPATIENT
Start: 2019-05-28 | End: 2019-06-27

## 2019-05-29 ENCOUNTER — DOCUMENTATION (OUTPATIENT)
Dept: NEUROSURGERY | Facility: CLINIC | Age: 44
End: 2019-05-29

## 2019-06-06 ENCOUNTER — OFFICE VISIT (OUTPATIENT)
Dept: INTERNAL MEDICINE | Age: 44
End: 2019-06-06
Payer: MEDICAID

## 2019-06-06 VITALS
TEMPERATURE: 97.2 F | WEIGHT: 263.6 LBS | DIASTOLIC BLOOD PRESSURE: 90 MMHG | SYSTOLIC BLOOD PRESSURE: 130 MMHG | BODY MASS INDEX: 33.83 KG/M2 | HEART RATE: 95 BPM | HEIGHT: 74 IN | OXYGEN SATURATION: 96 %

## 2019-06-06 DIAGNOSIS — R90.0 INTRACRANIAL MASS: ICD-10-CM

## 2019-06-06 DIAGNOSIS — I10 ESSENTIAL HYPERTENSION: Primary | ICD-10-CM

## 2019-06-06 DIAGNOSIS — G44.89 OTHER HEADACHE SYNDROME: ICD-10-CM

## 2019-06-06 DIAGNOSIS — E29.1 HYPOGONADISM MALE: ICD-10-CM

## 2019-06-06 DIAGNOSIS — R63.5 WEIGHT GAIN: ICD-10-CM

## 2019-06-06 DIAGNOSIS — Z86.011 HISTORY OF MENINGIOMA OF THE BRAIN: ICD-10-CM

## 2019-06-06 DIAGNOSIS — R56.9 SEIZURES (HCC): ICD-10-CM

## 2019-06-06 PROBLEM — Z98.890 HISTORY OF RESECTION OF MENINGIOMA: Status: ACTIVE | Noted: 2018-11-02

## 2019-06-06 PROBLEM — Z86.03 HISTORY OF RESECTION OF MENINGIOMA: Status: ACTIVE | Noted: 2018-11-02

## 2019-06-06 PROBLEM — G47.33 OSA ON CPAP: Status: ACTIVE | Noted: 2019-04-05

## 2019-06-06 PROBLEM — Z99.89 OSA ON CPAP: Status: ACTIVE | Noted: 2019-04-05

## 2019-06-06 PROBLEM — R51.9 HEADACHE: Status: ACTIVE | Noted: 2018-11-02

## 2019-06-06 PROCEDURE — 99213 OFFICE O/P EST LOW 20 MIN: CPT | Performed by: PHYSICIAN ASSISTANT

## 2019-06-06 RX ORDER — CARBAMAZEPINE 200 MG/1
400 TABLET ORAL 2 TIMES DAILY
COMMUNITY
Start: 2019-05-07

## 2019-06-06 RX ORDER — DEXAMETHASONE 1 MG
1 TABLET ORAL
COMMUNITY
Start: 2019-05-28 | End: 2019-06-27

## 2019-06-06 ASSESSMENT — ENCOUNTER SYMPTOMS
CONSTIPATION: 0
BACK PAIN: 0
WHEEZING: 0
ABDOMINAL PAIN: 0
EYE REDNESS: 0
NAUSEA: 0
SHORTNESS OF BREATH: 0
PHOTOPHOBIA: 0
RHINORRHEA: 0
EYE PAIN: 0
COLOR CHANGE: 0
CHEST TIGHTNESS: 0
COUGH: 0
VOMITING: 0
DIARRHEA: 0
SORE THROAT: 0
SINUS PRESSURE: 0

## 2019-06-06 NOTE — PROGRESS NOTES
[facility]  [address] [facility] [address]  Dept: [phone]  Dept Fax: [phone]: [phone]      [facility] INTERNAL MEDICINE  OFFICE NOTE      Chief Complaint   Patient presents with    Other     6 weeks , pt states he was admitted to the hospital for a headache , he was told he has to have a craniotomy        Shyanne Jarquin is a 37y.o. year old male who is seen for follow-up after being in the hospital.    Patient was admitted to [address] [place] [address] on 9 May 2019 and was later discharged on 14 May 2019. Patient was having severe headaches May 1 thought he had an infection so to treat with antibiotics. And on the hospital patient was noted to have an intracranial mass. Patient does have a history of meningioma in the past.  Patient is also been having some seizures in the past patient's currently taking Tegretol seems to be helping. She is also on steroids which has been helping some with the headaches. Patient states has been gaining some weight. Patient is also at [facility] right now. Patient states was supposed to have surgery at [facility] but they have elected to send patient to [facility] due to the level of the surgery and will take a neurosurgeon and a urine nose and throat surgeon to remove this mass ethmoid sinus with erosion of the anterior cranial fossa. Patient is supposed to see Dr. Emily Cervantes in [facility] next Wednesday. Patient's blood pressure was a little elevated today. Patient states it's only been running pretty good. Patient states his colostomy is on his mind and he just smoked a cigarette. Patient is taking hydralazine 10 mg and HCTZ 25 mg.       Active Ambulatory Problems     Diagnosis Date Noted    Anxiety 07/20/2018    Primary insomnia 07/20/2018    Essential hypertension 07/20/2018    History of meningioma of the brain 07/20/2018    Hemorrhoids 07/20/2018    Perineal folliculitis 27/07/4095    Psychiatric/Behavioral: Negative for confusion. The patient is not nervous/anxious. Current Outpatient Medications   Medication Sig Dispense Refill    carBAMazepine (TEGRETOL) 200 MG tablet Take 400 mg by mouth 2 times daily      dexamethasone (DECADRON) 1 MG tablet Take 1 mg by mouth      Multiple Vitamins-Minerals (MENS MULTIVITAMIN PLUS) TABS Take by mouth daily      hydrALAZINE (APRESOLINE) 10 MG tablet Take 1 tablet by mouth daily 90 tablet 3    ondansetron (ZOFRAN) 4 MG tablet Take 1 tablet by mouth every 12 hours as needed for Nausea or Vomiting 60 tablet 1    hydrochlorothiazide (HYDRODIURIL) 25 MG tablet Take 1 tablet by mouth daily 90 tablet 3    amitriptyline (ELAVIL) 50 MG tablet Take 1 tablet by mouth nightly (Patient taking differently: Take 75 mg by mouth nightly ) 90 tablet 3     No current facility-administered medications for this visit. BP (!) 130/90   Pulse 95   Temp 97.2 °F (36.2 °C)   Ht 6' 2\" (1.88 m)   Wt 263 lb 9.6 oz (119.6 kg)   SpO2 96%   BMI 33.84 kg/m²     PHYSICAL EXAM  Physical Exam   Constitutional: Vital signs are normal. He appears well-developed and well-nourished. HENT:   Head: Normocephalic and atraumatic. Right Ear: External ear normal.   Left Ear: External ear normal.   Nose: Nose normal.   Eyes: Pupils are equal, round, and reactive to light. Right eye exhibits no discharge. Left eye exhibits no discharge. Neck: Normal range of motion. No tracheal deviation present. Cardiovascular: Normal rate, regular rhythm and normal heart sounds. Exam reveals no gallop and no friction rub. No murmur heard. Pulmonary/Chest: Effort normal and breath sounds normal. No respiratory distress. He has no wheezes. He has no rales. He exhibits no tenderness. Abdominal: Soft. There is no tenderness. There is no rebound and no guarding. Musculoskeletal: Normal range of motion. He exhibits no tenderness or deformity.    Lymphadenopathy:     He has no next week to discuss getting intracranial mass removed. Hopefully this will help with his headaches and seizures. Patient states has been gaining some weight. Patient states hasn't really changed his diet. Patient has been on steroids which could cause some weight gain but patient states he started having weight gain before he started the steroids. We'll get patient back in about 3 months and repeat the labs CBC, CMP, lipids. Patient follow-up sooner as needed. Orders Placed This Encounter   Procedures    CBC Auto Differential    Comprehensive Metabolic Panel    Lipid Panel    TSH without Reflex    Testosterone Free and Total Male        Return in about 3 months (around 9/6/2019), or if symptoms worsen or fail to improve, for Follow up with labs. All questions answered. Patient voices understanding and agrees to plans along with risks and benefits of plan. Patient is instructed to continue prior medications, diet, and exercise plans as instructed. Patient agrees to follow up as instructions, sooner if needed, or to go to ER if condition becomes emergent. Additional Instructions: As always, patient is advised to bring in medication bottles in order to correctly reconcile with our current list.      Paul Schmidt PA-C    EMR Dragon/transcription disclaimer: Much of this encounter note is electronic transcription/translation of spoken language to printed text. The electronictranslation of spoken language may be erroneous, or at times, nonsensical words or phrases may be inadvertently transcribed.  Although I have reviewed the note for such errors, some may still exist.

## 2019-08-01 RX ORDER — CARBAMAZEPINE 200 MG/1
TABLET ORAL
Qty: 120 TABLET | Refills: 5 | Status: SHIPPED | OUTPATIENT
Start: 2019-08-01

## 2019-08-09 ENCOUNTER — OFFICE VISIT (OUTPATIENT)
Dept: NEUROLOGY | Facility: CLINIC | Age: 44
End: 2019-08-09

## 2019-08-09 ENCOUNTER — LAB (OUTPATIENT)
Dept: LAB | Facility: HOSPITAL | Age: 44
End: 2019-08-09

## 2019-08-09 VITALS
SYSTOLIC BLOOD PRESSURE: 124 MMHG | DIASTOLIC BLOOD PRESSURE: 90 MMHG | WEIGHT: 265 LBS | OXYGEN SATURATION: 96 % | BODY MASS INDEX: 34.01 KG/M2 | HEIGHT: 74 IN | HEART RATE: 95 BPM

## 2019-08-09 DIAGNOSIS — R56.9 SEIZURE (HCC): ICD-10-CM

## 2019-08-09 DIAGNOSIS — R90.0 INTRACRANIAL MASS: Primary | ICD-10-CM

## 2019-08-09 DIAGNOSIS — R90.0 INTRACRANIAL MASS: ICD-10-CM

## 2019-08-09 DIAGNOSIS — E87.1 HYPONATREMIA: Primary | ICD-10-CM

## 2019-08-09 LAB
ALBUMIN SERPL-MCNC: 4.3 G/DL (ref 3.5–5)
ALBUMIN/GLOB SERPL: 1.3 G/DL (ref 1.1–2.5)
ALP SERPL-CCNC: 91 U/L (ref 24–120)
ALT SERPL W P-5'-P-CCNC: 30 U/L (ref 0–54)
ANION GAP SERPL CALCULATED.3IONS-SCNC: 9 MMOL/L (ref 4–13)
AST SERPL-CCNC: 44 U/L (ref 7–45)
BASOPHILS # BLD AUTO: 0.03 10*3/MM3 (ref 0–0.2)
BASOPHILS NFR BLD AUTO: 0.7 % (ref 0–1.5)
BILIRUB SERPL-MCNC: 0.4 MG/DL (ref 0.1–1)
BUN BLD-MCNC: 11 MG/DL (ref 5–21)
BUN/CREAT SERPL: 9 (ref 7–25)
CALCIUM SPEC-SCNC: 9.3 MG/DL (ref 8.4–10.4)
CARBAMAZEPINE SERPL-MCNC: 11.2 MCG/ML (ref 4–12)
CHLORIDE SERPL-SCNC: 92 MMOL/L (ref 98–110)
CO2 SERPL-SCNC: 29 MMOL/L (ref 24–31)
CREAT BLD-MCNC: 1.22 MG/DL (ref 0.5–1.4)
DEPRECATED RDW RBC AUTO: 42.9 FL (ref 37–54)
EOSINOPHIL # BLD AUTO: 0.03 10*3/MM3 (ref 0–0.4)
EOSINOPHIL NFR BLD AUTO: 0.7 % (ref 0.3–6.2)
ERYTHROCYTE [DISTWIDTH] IN BLOOD BY AUTOMATED COUNT: 13.1 % (ref 12.3–15.4)
GFR SERPL CREATININE-BSD FRML MDRD: 79 ML/MIN/1.73
GLOBULIN UR ELPH-MCNC: 3.3 GM/DL
GLUCOSE BLD-MCNC: 92 MG/DL (ref 70–100)
HCT VFR BLD AUTO: 40.3 % (ref 37.5–51)
HGB BLD-MCNC: 14.3 G/DL (ref 13–17.7)
IMM GRANULOCYTES # BLD AUTO: 0.01 10*3/MM3 (ref 0–0.05)
IMM GRANULOCYTES NFR BLD AUTO: 0.2 % (ref 0–0.5)
LYMPHOCYTES # BLD AUTO: 1.7 10*3/MM3 (ref 0.7–3.1)
LYMPHOCYTES NFR BLD AUTO: 37.2 % (ref 19.6–45.3)
MCH RBC QN AUTO: 31.6 PG (ref 26.6–33)
MCHC RBC AUTO-ENTMCNC: 35.5 G/DL (ref 31.5–35.7)
MCV RBC AUTO: 89.2 FL (ref 79–97)
MONOCYTES # BLD AUTO: 0.44 10*3/MM3 (ref 0.1–0.9)
MONOCYTES NFR BLD AUTO: 9.6 % (ref 5–12)
NEUTROPHILS # BLD AUTO: 2.36 10*3/MM3 (ref 1.7–7)
NEUTROPHILS NFR BLD AUTO: 51.6 % (ref 42.7–76)
NRBC BLD AUTO-RTO: 0 /100 WBC (ref 0–0.2)
PLATELET # BLD AUTO: 260 10*3/MM3 (ref 140–450)
PMV BLD AUTO: 8.8 FL (ref 6–12)
POTASSIUM BLD-SCNC: 4.2 MMOL/L (ref 3.5–5.3)
PROT SERPL-MCNC: 7.6 G/DL (ref 6.3–8.7)
RBC # BLD AUTO: 4.52 10*6/MM3 (ref 4.14–5.8)
SODIUM BLD-SCNC: 130 MMOL/L (ref 135–145)
WBC NRBC COR # BLD: 4.57 10*3/MM3 (ref 3.4–10.8)

## 2019-08-09 PROCEDURE — 85025 COMPLETE CBC W/AUTO DIFF WBC: CPT | Performed by: CLINICAL NURSE SPECIALIST

## 2019-08-09 PROCEDURE — 80156 ASSAY CARBAMAZEPINE TOTAL: CPT | Performed by: CLINICAL NURSE SPECIALIST

## 2019-08-09 PROCEDURE — 36415 COLL VENOUS BLD VENIPUNCTURE: CPT

## 2019-08-09 PROCEDURE — 99214 OFFICE O/P EST MOD 30 MIN: CPT | Performed by: CLINICAL NURSE SPECIALIST

## 2019-08-09 PROCEDURE — 80053 COMPREHEN METABOLIC PANEL: CPT | Performed by: CLINICAL NURSE SPECIALIST

## 2019-08-09 RX ORDER — TIZANIDINE 4 MG/1
4 TABLET ORAL NIGHTLY PRN
COMMUNITY

## 2019-08-09 NOTE — PROGRESS NOTES
Subjective     Chief Complaint   Patient presents with   • Seizures     pt states no sz   • Headache     pt states occ TELLEZ         Chirag Mata is a 43 y.o. male right handed, currently not working, at [employer].  He has hx of cerebral meningioma s/p craniotomy, HTN, HA, seizure disorder.  He is here today for  follow up for seizure. He is accompanied by his girl friend, Carmen.  Patient is ambulating unassisted. He was last seen 4/2019. He was having increased headaches. He was found to have intracranial mass in the sphenoid and ethmoid sinuses extending superiorly through to the floor of the anterior cranial fossa,    recurrent meningioma considered. He was hospitalized 5/2019. He was evaluated by Dr. Anguiano who recommended he be referred to tertiary care as the surgical approach would involve neurosurgery and ENT. He did under surgical intervention, transnasal approach 7/2/19 at [facility] with Dr. Gaona. I do not have any records from that. He has repeat imaging in the next few weeks.  Carmen tells me Dr. Gaona is considering radiation therapy for the remaining tumor.   At any rate, patient denies seizure or staring spell since last visit. Last reported seizure was 1/2019. He continues with tegretol 200 mg BID. He did have HA after surgery and now reports HA a few times per month lasting at the most 1-2 hours. Gets relief with ibuprofen. PCP also prescribed zanaflex which has helped HA.   He at one time had used CPAP that was discontinued 5/2019 after intracranial mass found.   He is concerned about the amount of weight gain which may be related to extended steroids. He also has some LE edema and encouraged to f/u with PCP.        Patient has history of bilateral frontal meningioma resections in 2015. He reports in 2012 began to have dizziness and would fall down.  He reportedly took Keppra just after surgery and it made him sick.  He stopped it shortly afterwards.  He has had no issues until the  several days prior to admission October 15, 2018 .  His girlfriend  assisted in the history.  She tells me that for the  several days prior to admission he was having  episodes of staring off in space and not being responsive.  He went to bed 10/14/18 at 11 PM.  At 3 AM this morning she woke up and found him to have increased tone and rhythmic jerking of the left face, arm, and leg.  This lasted less than 15 seconds but occur approximately 6-10 times.  He had additional seizures in the ambulance on the way to the hospital and several in the ED as well.  These were eventually stopped after multiple rounds of Versed and a load of fosphenytoin.  He did have tongue biting with this but no urinary incontinence.  He has no previous history of seizures.      Seizures    This is a chronic problem. Episode onset: last reported 10/21/18. There were more than 10 seizures. The most recent episode lasted 30 to 120 seconds. Associated symptoms include confusion, headaches and chest pain. Pertinent negatives include no speech difficulty, no cough, no nausea, no vomiting and no diarrhea. Characteristics include rhythmic jerking (shaking of legs), loss of consciousness, bit tongue and apnea. Characteristics do not include bowel incontinence or bladder incontinence. stare off, not able to talk. confusion The episode was witnessed (girlfriend). The seizures continued in the ED. hx of bilateral frontal meningioma resection 2015        Current Outpatient Medications   Medication Sig Dispense Refill   • amitriptyline (ELAVIL) 75 MG tablet Take 75 mg by mouth Every Night.     • carBAMazepine (TEGretol) 200 MG tablet TAKE 2 TABLETS BY MOUTH TWICE DAILY 120 tablet 5   • hydrALAZINE (APRESOLINE) 10 MG tablet Take 10 mg by mouth Daily.     • hydrochlorothiazide (HYDRODIURIL) 25 MG tablet Take 25 mg by mouth Daily.     • tiZANidine (ZANAFLEX) 4 MG tablet Take 4 mg by mouth At Night As Needed for Muscle Spasms.       No current  "facility-administered medications for this visit.        Past Medical History:   Diagnosis Date   • Drug abuse (CMS/HCC)    • Headache    • Hypertension    • Seizures (CMS/HCC)        Past Surgical History:   Procedure Laterality Date   • CRANIECTOMY  2015   • TESTICLE TORSION REPAIR      AS A CHILD       family history is not on file.    Social History     Tobacco Use   • Smoking status: Current Every Day Smoker     Packs/day: 1.00     Years: 20.00     Pack years: 20.00   • Smokeless tobacco: Never Used   Substance Use Topics   • Alcohol use: Yes     Comment: ocassionally   • Drug use: No     Comment: HX OF DRUG ABUSE       Review of Systems   Constitutional: Negative.  Negative for fatigue.   HENT: Negative for trouble swallowing and voice change.    Eyes: Negative.    Respiratory: Positive for apnea and shortness of breath. Negative for cough.    Cardiovascular: Positive for chest pain and leg swelling.   Gastrointestinal: Negative.  Negative for bowel incontinence, constipation, diarrhea, nausea and vomiting.   Endocrine: Negative.  Negative for cold intolerance and heat intolerance.   Genitourinary: Negative.  Negative for bladder incontinence, dysuria and frequency.   Musculoskeletal: Negative for arthralgias and gait problem.   Skin: Negative.    Allergic/Immunologic: Negative.    Neurological: Positive for seizures, loss of consciousness and headaches. Negative for dizziness, speech difficulty and weakness.   Hematological: Negative.    Psychiatric/Behavioral: Positive for confusion. Negative for agitation and hallucinations.   All other systems reviewed and are negative.      Objective     /90 (BP Location: Left arm, Patient Position: Sitting, Cuff Size: Adult)   Pulse 95   Ht 188 cm (74\")   Wt 120 kg (265 lb)   SpO2 96%   BMI 34.02 kg/m² , Body mass index is 34.02 kg/m².    Physical Exam   Constitutional: He is oriented to person, place, and time. Vital signs are normal. He appears " well-developed and well-nourished. He is cooperative.   HENT:   Head: Normocephalic and atraumatic.   Right Ear: Hearing and external ear normal.   Left Ear: Hearing and external ear normal.   Nose: Nose normal.   Mouth/Throat: Oropharynx is clear and moist.   Eyes: Conjunctivae, EOM and lids are normal. Pupils are equal, round, and reactive to light. Right eye exhibits normal extraocular motion and no nystagmus. Left eye exhibits normal extraocular motion and no nystagmus. Right pupil is round and reactive. Left pupil is round and reactive. Pupils are equal.   Neck: Normal range of motion. Neck supple. Carotid bruit is not present.   Cardiovascular: Normal rate, regular rhythm and normal heart sounds.   No murmur heard.  Pulses:       Dorsalis pedis pulses are 1+ on the right side, and 1+ on the left side.        Posterior tibial pulses are 1+ on the right side, and 1+ on the left side.   Pulmonary/Chest: Effort normal and breath sounds normal. He has no decreased breath sounds. He has no rhonchi.   Abdominal: Soft. Bowel sounds are normal.   Musculoskeletal: Normal range of motion.     Vascular Status -  His right foot exhibits abnormal foot edema (mild). His left foot exhibits abnormal foot edema (mild).  Neurological: He is alert and oriented to person, place, and time. He has normal strength and normal reflexes. He displays no tremor. No cranial nerve deficit or sensory deficit. He exhibits normal muscle tone. He displays a negative Romberg sign. Coordination and gait normal.   Reflex Scores:       Tricep reflexes are 2+ on the right side and 2+ on the left side.       Bicep reflexes are 2+ on the right side and 2+ on the left side.       Brachioradialis reflexes are 2+ on the right side and 2+ on the left side.       Patellar reflexes are 2+ on the right side and 2+ on the left side.       Achilles reflexes are 2+ on the right side and 2+ on the left side.  Awake, alert. No aphasia, no dysarthria  Completes  simple and complex commands    CN II:  Visual fields full.  Pupils equally reactive to light  CN III, IV, VI:  Extraocular Muscles full with no signs of nystagmus  CN V:  Facial sensory is symmetric with no asymetries.  CN VII:  Facial motor symmetric  CN VIII:  Gross hearing intact bilaterally  CN IX:  Palate elevates symmetrically  CN X:  Palate elevates symmetrically  CN XI:  Shoulder shrug symmetric  CN XII:  Tongue is midline on protrusion    Full and symmetric strength bilateral upper and lower extremities.   Skin: Skin is warm and dry.   Psychiatric: He has a normal mood and affect. His speech is normal and behavior is normal. Cognition and memory are normal.   Nursing note and vitals reviewed.      Results for orders placed or performed in visit on 05/24/19   Comprehensive Metabolic Panel   Result Value Ref Range    Glucose 137 (H) 70 - 100 mg/dL    BUN 14 5 - 21 mg/dL    Creatinine 1.08 0.50 - 1.40 mg/dL    Sodium 131 (L) 135 - 145 mmol/L    Potassium 4.3 3.5 - 5.3 mmol/L    Chloride 93 (L) 98 - 110 mmol/L    CO2 28.0 24.0 - 31.0 mmol/L    Calcium 8.9 8.4 - 10.4 mg/dL    Total Protein 7.5 6.3 - 8.7 g/dL    Albumin 4.30 3.50 - 5.00 g/dL    ALT (SGPT) 34 0 - 54 U/L    AST (SGOT) 32 7 - 45 U/L    Alkaline Phosphatase 95 24 - 120 U/L    Total Bilirubin 0.4 0.1 - 1.0 mg/dL    eGFR  African Amer 90 >60 mL/min/1.73    Globulin 3.2 gm/dL    A/G Ratio 1.3 1.1 - 2.5 g/dL    BUN/Creatinine Ratio 13.0 7.0 - 25.0    Anion Gap 10.0 4.0 - 13.0 mmol/L   CBC Auto Differential   Result Value Ref Range    WBC 5.56 4.80 - 10.80 10*3/mm3    RBC 4.21 (L) 4.80 - 5.90 10*6/mm3    Hemoglobin 13.4 (L) 14.0 - 18.0 g/dL    Hematocrit 38.6 (L) 40.0 - 52.0 %    MCV 91.7 82.0 - 95.0 fL    MCH 31.8 28.0 - 32.0 pg    MCHC 34.7 33.0 - 36.0 g/dL    RDW 13.3 12.0 - 15.0 %    RDW-SD 44.7 40.0 - 54.0 fl    MPV 8.4 6.0 - 12.0 fL    Platelets 356 130 - 400 10*3/mm3   Manual Differential   Result Value Ref Range    Neutrophil % 52.6 39.0 - 78.0 %     Lymphocyte % 43.2 15.0 - 45.0 %    Monocyte % 2.1 (L) 4.0 - 12.0 %    Atypical Lymphocyte % 2.1 0.0 - 5.0 %    Neutrophils Absolute 2.92 1.87 - 8.40 10*3/mm3    Lymphocytes Absolute 2.40 0.72 - 4.86 10*3/mm3    Monocytes Absolute 0.12 (L) 0.19 - 1.30 10*3/mm3    RBC Morphology Normal Normal    WBC Morphology Normal Normal    Platelet Morphology Normal Normal      Ct head 5/2019:  IMPRESSION:  1. Redemonstration of anterior cranial fossa mass with anatomic  considerations described in detail above.   2. Bifrontal hypodensity with ex vacuo dilation of the anterior horns of  the lateral ventricles, likely encephalomalacia.  This report was finalized on 05/11/2019 00:36 by Dr Sonya Comer MD    MRI BRAIN:  IMPRESSION:  1. Large hypercellular mass in the sphenoid and ethmoid sinuses  extending superiorly through to the floor of the anterior cranial fossa.  Imaging characteristics suggest a hypercellular neoplasm, recurrent  meningioma considered.  2. Postsurgical changes frontal lobes.  3. Cerebellar tonsillar ectopia slightly more conspicuous compared to  the previous study. These findings suggest the possibility of increased  intracranial pressure.  This report was finalized on 05/10/2019 16:17 by Dr. Nabil Blanchard MD        ASSESSMENT/PLAN    Diagnoses and all orders for this visit:    Intracranial mass    Seizure (CMS/HCC)    Other orders  -     tiZANidine (ZANAFLEX) 4 MG tablet; Take 4 mg by mouth At Night As Needed for Muscle Spasms.  -     CBC & Differential; Future  -     Comprehensive Metabolic Panel; Future  -     Carbamazepine Level, Total; Future      1. Continue  Tegretol  200 mg 2 tablet BID .  2. check CBC, CMP, and tegretol level.   3. Counseled on safety measures for seizure related to occupation. No climbing ladders, using sharp tools, shower only.  4. continue Elavil 75 mg nighthly  5. Counseled on risk factors that lower seizure threshold  6. Seizure precautions were discussed to include no tub  baths, no swimming, avoiding lack of sleep, and avoiding known triggers. Education given of things that may contribute to a seizure to include, but not limited to: stressful situations, fever, fatigue, lack of sleep, low blood sugar, hyperventilation, flashing lights, and caffeine. Instructions given to take seizure medications as prescribed. Education given to family member on what to do during a seizure and care following the seizure. Education given to contact this office prior to stopping or changing any medications.  7. Patient requesting referral and has upcoming appointment with Dr. Snell.  8.  Chirag Mata is a current cigarettes user.  He currently smokes 1 pack of cigarettes per day for a duration of 20 years. I have educated him on the risk of diseases from using tobacco products such as cancer, COPD and heart diease.     I advised him to quit and he is not willing to quit.    9. Patient's Body mass index is 34.02 kg/m². BMI is above normal parameters. Recommendations include: educational material.    HPI and ROS reviewed and updated.      allergies and all known medications/prescriptions have been reviewed using resources available on this encounter.    Return in about 3 months (around 11/9/2019).        Viviane Glasgow, APRN

## 2019-08-09 NOTE — PATIENT INSTRUCTIONS

## 2019-09-06 ENCOUNTER — OFFICE VISIT (OUTPATIENT)
Dept: INTERNAL MEDICINE | Age: 44
End: 2019-09-06
Payer: MEDICAID

## 2019-09-06 VITALS
OXYGEN SATURATION: 95 % | WEIGHT: 264.4 LBS | BODY MASS INDEX: 33.93 KG/M2 | DIASTOLIC BLOOD PRESSURE: 80 MMHG | TEMPERATURE: 96.8 F | HEIGHT: 74 IN | SYSTOLIC BLOOD PRESSURE: 132 MMHG | HEART RATE: 94 BPM

## 2019-09-06 DIAGNOSIS — R56.9 SEIZURES (HCC): ICD-10-CM

## 2019-09-06 DIAGNOSIS — G44.89 OTHER HEADACHE SYNDROME: ICD-10-CM

## 2019-09-06 DIAGNOSIS — Z86.011 HISTORY OF MENINGIOMA OF THE BRAIN: ICD-10-CM

## 2019-09-06 DIAGNOSIS — I10 ESSENTIAL HYPERTENSION: ICD-10-CM

## 2019-09-06 DIAGNOSIS — Z00.00 ROUTINE GENERAL MEDICAL EXAMINATION AT A HEALTH CARE FACILITY: Primary | ICD-10-CM

## 2019-09-06 DIAGNOSIS — K92.89 GAS BLOAT SYNDROME: ICD-10-CM

## 2019-09-06 DIAGNOSIS — E29.1 HYPOGONADISM MALE: ICD-10-CM

## 2019-09-06 DIAGNOSIS — R90.0 INTRACRANIAL MASS: ICD-10-CM

## 2019-09-06 PROCEDURE — 99214 OFFICE O/P EST MOD 30 MIN: CPT | Performed by: PHYSICIAN ASSISTANT

## 2019-09-06 RX ORDER — TIZANIDINE 4 MG/1
4 TABLET ORAL DAILY
COMMUNITY

## 2019-09-06 ASSESSMENT — ENCOUNTER SYMPTOMS
CHEST TIGHTNESS: 0
EYE PAIN: 0
PHOTOPHOBIA: 0
COUGH: 0
EYE REDNESS: 0
VOMITING: 0
BACK PAIN: 0
SHORTNESS OF BREATH: 0
SINUS PRESSURE: 0
WHEEZING: 0
DIARRHEA: 0
NAUSEA: 0
COLOR CHANGE: 0
ABDOMINAL PAIN: 0
CONSTIPATION: 0
SORE THROAT: 0
RHINORRHEA: 0

## 2019-09-06 NOTE — PROGRESS NOTES
[facility] INTERNAL MEDICINE  [address] [address]  [address] [address]  Dept: [phone]  Dept Fax: [phone]: [phone]      [facility] INTERNAL MEDICINE  OFFICE NOTE      Chief Complaint   Patient presents with    3 Month Follow-Up       Tu Cortes is a 37y.o. year old male who is seen for 3-month follow-up for chronic conditions intracranial mass, headaches, hypertension. Patient states had surgery in July through a transnasal approach to remove a recurrent meningioma. Patient states the surgery went well. Patient getting a lot less headaches and has not had any more seizures since January 2019. Patient is currently on Tegretol 400 mg twice a day. Patient is seeing neurosurgery over at [facility] but had the surgery at [facility]. Patient's blood pressure seems to be stable at this time. Patient's current medication regiment seems to be adequate. Patient denies any chest pain, shortness of breath, palpitations. Patient states compliant with taking medication. Patient fairly well controlled with hydralazine and hydrochlorothiazide. Patient's hyperlipidemia seems to be fairly well controlled with diet and exercise. Patient states trying to properly eat and exercise as directed. he is going get fasting labs will give him a call once we get the results back. Patient states does have some bloating and has quite a bit of gas. I suggested patient get some simethicone to see if that would help with the bloating and gas. After surgery patient did have some trigeminal neuralgia and is taking Zanaflex which seems to be helping. Patient also states has a little fibroma on the bottom of his foot. Patient denies any pain has not changed in size. We will continue to monitor. Denies any other issues at this time.   Active Ambulatory Problems     Diagnosis Date Noted    Anxiety 07/20/2018    Primary insomnia 07/20/2018    Essential hypertension 07/20/2018    History of meningioma of the brain 07/20/2018    Hemorrhoids 07/20/2018    Perineal folliculitis 92/82/2242    Headache 11/02/2018    History of resection of meningioma 11/02/2018    Intracranial mass 05/21/2019    DAE on CPAP 04/05/2019    Seizure ([facility] [address] 10/15/2018     Resolved Ambulatory Problems     Diagnosis Date Noted    No Resolved Ambulatory Problems     Past Medical History:   Diagnosis Date    Hypertension        Past Medical History:   Diagnosis Date    Headache     Hypertension        Prior to Visit Medications    Medication Sig Taking? Authorizing Provider   tiZANidine (ZANAFLEX) 4 MG tablet Take 4 mg by mouth daily Yes Historical Provider, MD   carBAMazepine (TEGRETOL) 200 MG tablet Take 400 mg by mouth 2 times daily Yes Historical Provider, MD   Multiple Vitamins-Minerals (MENS MULTIVITAMIN PLUS) TABS Take by mouth daily Yes Historical Provider, MD   hydrALAZINE (APRESOLINE) 10 MG tablet Take 1 tablet by mouth daily Yes MOLLY Quezada   ondansetron (ZOFRAN) 4 MG tablet Take 1 tablet by mouth every 12 hours as needed for Nausea or Vomiting Yes MOLLY Quezada   hydrochlorothiazide (HYDRODIURIL) 25 MG tablet Take 1 tablet by mouth daily Yes MOLLY Quezada   amitriptyline (ELAVIL) 50 MG tablet Take 1 tablet by mouth nightly  Patient taking differently: Take 75 mg by mouth nightly  Yes MOLLY Quezada       Past Surgical History:   Procedure Laterality Date    BRAIN SURGERY  2015    tumor removal       History reviewed. No pertinent family history.     Allergies   Allergen Reactions    Sulfamethoxazole-Trimethoprim Nausea And Vomiting       Social History     Socioeconomic History    Marital status: Single     Spouse name: Not on file    Number of children: Not on file    Years of education: Not on file    Highest education level: Not on file   Occupational History    Not on file   Social Needs    Financial resource strain: Not on file    Food insecurity: Worry: Not on file     Inability: Not on file    Transportation needs:     Medical: Not on file     Non-medical: Not on file   Tobacco Use    Smoking status: Current Every Day Smoker     Packs/day: 0.50     Types: Cigarettes    Smokeless tobacco: Never Used   Substance and Sexual Activity    Alcohol use: Yes    Drug use: No    Sexual activity: Not on file   Lifestyle    Physical activity:     Days per week: Not on file     Minutes per session: Not on file    Stress: Not on file   Relationships    Social connections:     Talks on phone: Not on file     Gets together: Not on file     Attends Hinduism service: Not on file     Active member of club or organization: Not on file     Attends meetings of clubs or organizations: Not on file     Relationship status: Not on file    Intimate partner violence:     Fear of current or ex partner: Not on file     Emotionally abused: Not on file     Physically abused: Not on file     Forced sexual activity: Not on file   Other Topics Concern    Not on file   Social History Narrative    Not on file       Review of Systems  Review of Systems   Constitutional: Negative for activity change, appetite change, fatigue and unexpected weight change. HENT: Negative for ear pain, postnasal drip, rhinorrhea, sinus pressure, sneezing and sore throat. Eyes: Negative for photophobia, pain and redness. Respiratory: Negative for cough, chest tightness, shortness of breath and wheezing. Cardiovascular: Negative for chest pain, palpitations and leg swelling. Gastrointestinal: Negative for abdominal pain, constipation, diarrhea, nausea and vomiting. Genitourinary: Negative for dysuria, frequency, hematuria and urgency. Musculoskeletal: Negative for arthralgias, back pain, gait problem, joint swelling and myalgias. Skin: Negative for color change, pallor and wound. Neurological: Positive for seizures.  Negative for dizziness, speech difficulty, weakness,

## 2019-10-10 DIAGNOSIS — I10 ESSENTIAL HYPERTENSION: ICD-10-CM

## 2019-10-10 RX ORDER — HYDROCHLOROTHIAZIDE 25 MG/1
TABLET ORAL
Qty: 30 TABLET | Refills: 11 | Status: SHIPPED | OUTPATIENT
Start: 2019-10-10

## 2024-08-16 ENCOUNTER — OFFICE VISIT (OUTPATIENT)
Dept: INTERNAL MEDICINE | Facility: CLINIC | Age: 49
End: 2024-08-16
Payer: COMMERCIAL

## 2024-08-16 VITALS
DIASTOLIC BLOOD PRESSURE: 90 MMHG | HEART RATE: 86 BPM | SYSTOLIC BLOOD PRESSURE: 124 MMHG | TEMPERATURE: 97.5 F | WEIGHT: 238 LBS | HEIGHT: 74 IN | BODY MASS INDEX: 30.54 KG/M2 | RESPIRATION RATE: 16 BRPM | OXYGEN SATURATION: 97 %

## 2024-08-16 DIAGNOSIS — Z12.11 COLON CANCER SCREENING: ICD-10-CM

## 2024-08-16 DIAGNOSIS — Z98.890 HISTORY OF RESECTION OF MENINGIOMA: ICD-10-CM

## 2024-08-16 DIAGNOSIS — E29.1 HYPOGONADISM IN MALE: ICD-10-CM

## 2024-08-16 DIAGNOSIS — I10 PRIMARY HYPERTENSION: ICD-10-CM

## 2024-08-16 DIAGNOSIS — Z76.89 ENCOUNTER TO ESTABLISH CARE: Primary | ICD-10-CM

## 2024-08-16 DIAGNOSIS — G40.909 SEIZURE DISORDER: ICD-10-CM

## 2024-08-16 DIAGNOSIS — K64.9 BLEEDING HEMORRHOID: ICD-10-CM

## 2024-08-16 DIAGNOSIS — Z86.03 HISTORY OF RESECTION OF MENINGIOMA: ICD-10-CM

## 2024-08-16 DIAGNOSIS — G93.5 CHIARI I MALFORMATION: ICD-10-CM

## 2024-08-16 DIAGNOSIS — R09.81 SINUS CONGESTION: ICD-10-CM

## 2024-08-16 RX ORDER — HYDROCHLOROTHIAZIDE 12.5 MG/1
12.5 CAPSULE, GELATIN COATED ORAL EVERY MORNING
COMMUNITY
Start: 2024-07-19 | End: 2024-08-16

## 2024-08-16 RX ORDER — FLUTICASONE PROPIONATE 50 MCG
2 SPRAY, SUSPENSION (ML) NASAL DAILY
Qty: 18.2 G | Refills: 0 | Status: SHIPPED | OUTPATIENT
Start: 2024-08-16

## 2024-08-16 RX ORDER — AMLODIPINE BESYLATE 10 MG/1
10 TABLET ORAL DAILY
COMMUNITY

## 2024-08-16 RX ORDER — POTASSIUM CHLORIDE 1500 MG/1
20 TABLET, EXTENDED RELEASE ORAL DAILY
COMMUNITY
Start: 2024-07-19

## 2024-08-16 RX ORDER — HYDROCORTISONE 25 MG/G
CREAM TOPICAL 2 TIMES DAILY
Qty: 28 G | Refills: 0 | Status: SHIPPED | OUTPATIENT
Start: 2024-08-16

## 2024-08-16 RX ORDER — CETIRIZINE HYDROCHLORIDE 10 MG/1
10 TABLET ORAL DAILY
Qty: 30 TABLET | Refills: 0 | Status: SHIPPED | OUTPATIENT
Start: 2024-08-16

## 2024-08-16 NOTE — PROGRESS NOTES
"        Subjective     Chief Complaint:  Establish care, hemorrhoids    HPI:  Patient presents today to establish care.  He would like to discuss bleeding hemorrhoids. Please see assessment and plan below.    Past Medical History:   Past Medical History:   Diagnosis Date    Drug abuse     Headache     Hypertension     Seizures      Past Surgical History:  Past Surgical History:   Procedure Laterality Date    CRANIECTOMY  2015    TESTICLE TORSION REPAIR      AS A CHILD       Allergies:  Allergies   Allergen Reactions    Bactrim [Sulfamethoxazole-Trimethoprim] Nausea And Vomiting            Medications:  Prior to Admission medications    Medication Sig Start Date End Date Taking? Authorizing Provider   amitriptyline (ELAVIL) 75 MG tablet Take 1 tablet by mouth Every Night.   Yes Clinton De Jesus MD   carBAMazepine (TEGretol) 200 MG tablet TAKE 2 TABLETS BY MOUTH TWICE DAILY 8/1/19  Yes Viviane Glasgow APRN   hydrALAZINE (APRESOLINE) 10 MG tablet Take 10 mg by mouth Daily.    Clinton De Jesus MD   hydrochlorothiazide (HYDRODIURIL) 25 MG tablet Take 25 mg by mouth Daily.    Clinton De Jesus MD   tiZANidine (ZANAFLEX) 4 MG tablet Take 4 mg by mouth At Night As Needed for Muscle Spasms.    Clinton De Jesus MD       Objective     Vital Signs: /90 (BP Location: Left arm, Patient Position: Sitting, Cuff Size: Adult)   Pulse 86   Temp 97.5 °F (36.4 °C) (Infrared)   Resp 16   Ht 188 cm (74\")   Wt 108 kg (238 lb)   SpO2 97%   BMI 30.56 kg/m²   Physical Exam  Vitals and nursing note reviewed.   Constitutional:       General: He is not in acute distress.     Appearance: Normal appearance. He is obese.   Cardiovascular:      Rate and Rhythm: Normal rate and regular rhythm.      Pulses: Normal pulses.      Heart sounds: Normal heart sounds. No murmur heard.  Pulmonary:      Effort: Pulmonary effort is normal. No respiratory distress.      Breath sounds: Normal breath sounds.   Skin:     Findings: " No bruising, lesion or rash.   Neurological:      Mental Status: He is alert and oriented to person, place, and time. Mental status is at baseline.      Motor: No weakness.       BMI is >= 30 and <35. (Class 1 Obesity). The following options were offered after discussion;: exercise counseling/recommendations and nutrition counseling/recommendations    Results Reviewed:  No recent labs available for review.    Assessment / Plan     Assessment/Plan:  Diagnoses and all orders for this visit:    1. Encounter to establish care (Primary)    2. Bleeding hemorrhoid  -     Hydrocortisone, Perianal, (Anusol-HC) 2.5 % rectal cream; Insert  into the rectum 2 (Two) Times a Day.  Dispense: 28 g; Refill: 0  -     Hydrocort-Pramoxine, Perianal, (PROCTOFOAM-HS) 1-1 % rectal foam; Insert 1 Application into the rectum 2 (Two) Times a Day.  Dispense: 10 g; Refill: 0  -     Ambulatory Referral For Screening Colonoscopy    3. Hypogonadism in male  -     Testosterone    4. Primary hypertension  -     CBC & Differential  -     Comprehensive Metabolic Panel  -     Urinalysis With Microscopic - Urine, Clean Catch  -     Lipid Panel  -     TSH Rfx On Abnormal To Free T4    5. Sinus congestion  -     cetirizine (zyrTEC) 10 MG tablet; Take 1 tablet by mouth Daily.  Dispense: 30 tablet; Refill: 0  -     fluticasone (FLONASE) 50 MCG/ACT nasal spray; 2 sprays into the nostril(s) as directed by provider Daily.  Dispense: 18.2 g; Refill: 0    6. Seizure disorder  -     Ambulatory Referral to Neurology    7. Chiari I malformation  -     Ambulatory Referral to Neurology    8. History of resection of meningioma  -     Ambulatory Referral to Neurology    9. Colon cancer screening  -     Ambulatory Referral For Screening Colonoscopy    Other orders  -     Microscopic Examination -       Patient presents today to establish care.  He had previously been seen by AISHA Mcintosh at [facility].  He has been incarcerated for the last several  "years and has been seeing a provider in [place].  He has past medical history significant for hypertension, intracranial mass, Chiari malformation, seizure disorder, and bleeding hemorrhoid.    Patient takes amlodipine 10 mg daily and Toprol-XL 50 mg daily.  It appears that he has not taken HCTZ in quite some time.  He had been on potassium chloride daily, likely secondary to being on hydrochlorothiazide. Patient states that he takes it for numbness in his arm. I would like to obtain some labs today to determine if he needs ongoing potassium replacement.    Patient takes Elavil for management of depression and headaches.  We will be taking over this prescription.    Patient has a history of intracranial mass, seizure disorder, and Chiari malformation.  He has followed with neurology for seizures.  He currently takes carbamazepine.  Will place referral to neurology for ongoing care.    Patient complains of sinus congestion and drainage.  He states that he stays \"snotty\" and it never improves.  I have advised him to begin taking cetirizine and using Flonase nasal spray daily.  He has taken an antihistamine in the past which seemed to help with the symptoms.    Patient complains of rectal bleeding that has been a problem for several years.  He states that he has hemorrhoids.  He is able to visualize them.  He states that he has issues with constipation which is the most likely cause of these hemorrhoids.  Will treat with Anusol rectal cream.  I have concern that he does have internal hemorrhoids as well so I have provided him with Proctofoam.  He declines suppositories.    Patient states that he had a colonoscopy years ago and was told that he has polyps.  Will place referral to GI for colorectal cancer screening.    He would like to have testosterone checked with labs.    He will follow-up in about 4 weeks for annual physical.    Return in about 4 weeks (around 9/13/2024) for Annual physical. unless patient needs to " be seen sooner or acute issues arise.    I have discussed the patient results/orders and and plan/recommendation with them at today's visit.      Maria Victoria Sevilla, APRN   08/16/2024

## 2024-08-17 LAB
ALBUMIN SERPL-MCNC: 4.4 G/DL (ref 4.1–5.1)
ALP SERPL-CCNC: 82 IU/L (ref 44–121)
ALT SERPL-CCNC: 26 IU/L (ref 0–44)
APPEARANCE UR: CLEAR
AST SERPL-CCNC: 30 IU/L (ref 0–40)
BACTERIA #/AREA URNS HPF: NORMAL /[HPF]
BASOPHILS # BLD AUTO: 0 X10E3/UL (ref 0–0.2)
BASOPHILS NFR BLD AUTO: 1 %
BILIRUB SERPL-MCNC: 0.2 MG/DL (ref 0–1.2)
BILIRUB UR QL STRIP: NEGATIVE
BUN SERPL-MCNC: 11 MG/DL (ref 6–24)
BUN/CREAT SERPL: 8 (ref 9–20)
CALCIUM SERPL-MCNC: 9.6 MG/DL (ref 8.7–10.2)
CASTS URNS QL MICRO: NORMAL /LPF
CHLORIDE SERPL-SCNC: 101 MMOL/L (ref 96–106)
CHOLEST SERPL-MCNC: 130 MG/DL (ref 100–199)
CO2 SERPL-SCNC: 24 MMOL/L (ref 20–29)
COLOR UR: YELLOW
CREAT SERPL-MCNC: 1.39 MG/DL (ref 0.76–1.27)
EGFRCR SERPLBLD CKD-EPI 2021: 63 ML/MIN/1.73
EOSINOPHIL # BLD AUTO: 0.1 X10E3/UL (ref 0–0.4)
EOSINOPHIL NFR BLD AUTO: 1 %
EPI CELLS #/AREA URNS HPF: NORMAL /HPF (ref 0–10)
ERYTHROCYTE [DISTWIDTH] IN BLOOD BY AUTOMATED COUNT: 13.1 % (ref 11.6–15.4)
GLOBULIN SER CALC-MCNC: 2.9 G/DL (ref 1.5–4.5)
GLUCOSE SERPL-MCNC: 67 MG/DL (ref 70–99)
GLUCOSE UR QL STRIP: NEGATIVE
HCT VFR BLD AUTO: 45.2 % (ref 37.5–51)
HDLC SERPL-MCNC: 59 MG/DL
HGB BLD-MCNC: 15.1 G/DL (ref 13–17.7)
HGB UR QL STRIP: NEGATIVE
IMM GRANULOCYTES # BLD AUTO: 0 X10E3/UL (ref 0–0.1)
IMM GRANULOCYTES NFR BLD AUTO: 0 %
KETONES UR QL STRIP: NEGATIVE
LDLC SERPL CALC-MCNC: 55 MG/DL (ref 0–99)
LEUKOCYTE ESTERASE UR QL STRIP: NEGATIVE
LYMPHOCYTES # BLD AUTO: 1.5 X10E3/UL (ref 0.7–3.1)
LYMPHOCYTES NFR BLD AUTO: 36 %
MCH RBC QN AUTO: 32.7 PG (ref 26.6–33)
MCHC RBC AUTO-ENTMCNC: 33.4 G/DL (ref 31.5–35.7)
MCV RBC AUTO: 98 FL (ref 79–97)
MICRO URNS: NORMAL
MICRO URNS: NORMAL
MONOCYTES # BLD AUTO: 0.4 X10E3/UL (ref 0.1–0.9)
MONOCYTES NFR BLD AUTO: 10 %
NEUTROPHILS # BLD AUTO: 2.1 X10E3/UL (ref 1.4–7)
NEUTROPHILS NFR BLD AUTO: 52 %
NITRITE UR QL STRIP: NEGATIVE
PH UR STRIP: 7 [PH] (ref 5–7.5)
PLATELET # BLD AUTO: 280 X10E3/UL (ref 150–450)
POTASSIUM SERPL-SCNC: 4.1 MMOL/L (ref 3.5–5.2)
PROT SERPL-MCNC: 7.3 G/DL (ref 6–8.5)
PROT UR QL STRIP: NORMAL
RBC # BLD AUTO: 4.62 X10E6/UL (ref 4.14–5.8)
RBC #/AREA URNS HPF: NORMAL /HPF (ref 0–2)
SODIUM SERPL-SCNC: 141 MMOL/L (ref 134–144)
SP GR UR STRIP: 1.02 (ref 1–1.03)
TESTOST SERPL-MCNC: 229 NG/DL (ref 264–916)
TRIGL SERPL-MCNC: 84 MG/DL (ref 0–149)
TSH SERPL DL<=0.005 MIU/L-ACNC: 1.5 UIU/ML (ref 0.45–4.5)
UROBILINOGEN UR STRIP-MCNC: 0.2 MG/DL (ref 0.2–1)
VLDLC SERPL CALC-MCNC: 16 MG/DL (ref 5–40)
WBC # BLD AUTO: 4 X10E3/UL (ref 3.4–10.8)
WBC #/AREA URNS HPF: NORMAL /HPF (ref 0–5)

## 2024-08-19 ENCOUNTER — TELEPHONE (OUTPATIENT)
Dept: INTERNAL MEDICINE | Facility: CLINIC | Age: 49
End: 2024-08-19
Payer: COMMERCIAL

## 2024-08-19 NOTE — TELEPHONE ENCOUNTER
Called the patient to inform that we do not know what will or wont be covered by his insurance and gave instructions to talk to his pharmacist to see what their experience is in coverage for this type of medication, to see if they have alternative suggestions, or find out what his out-of-pocket expense might be for the medication with using GoodRx. Patient voiced understanding.

## 2024-08-19 NOTE — TELEPHONE ENCOUNTER
Caller: Chirag Mata    Relationship: Self    Best call back number: [phone]         Who are you requesting to speak with (clinical staff, provider,  specific staff member): CLI         What was the call regarding: CALLING BACK TO SEE IF WE HAVE ANY SUGGESTIONS ON ALTERNATIVE.  I DID TELL HIM CLINICAL IS WAITING ON PROVIDER TO ADDRESS.  PLEASE CALL BACK WITH ADVISEMENT.

## 2024-08-19 NOTE — TELEPHONE ENCOUNTER
Caller: Jocelyne Mata    Relationship: Self    Best call back number: [phone]    Who are you requesting to speak with     CLINICAL STAFF    Do you know the name of the person who called:     JOCELYNE    What was the call regarding:     PATIENTS INSURANCE DOES NOT COVER THE   Hydrocort-Pramoxine, Perianal, (PROCTOFOAM-HS) 1-1 % rectal foam       IS THERE ANOTHER LIKE MEDICATION THAT CAN BE CALLED IN?    Is it okay if the provider responds through MyChart:     NO

## 2024-08-19 NOTE — TELEPHONE ENCOUNTER
Caller: Jocelyne Mata    Relationship: Self    Best call back number: [phone]     What is the best time to reach you: ANYTIME    Who are you requesting to speak with (clinical staff, provider,  specific staff member): CLINICAL    Do you know the name of the person who called: JOCELYNE    What was the call regarding: JOCELYNE CALLED HIS PHARMACY REGARDING THEM SUGGESTING AN ALTERNATIVE MEDICATION FOR THE FOAM BUT THEY SAID THAT THEY CAN'T RECOMMEND ANY ALTERNATIVES. THEY SAID THE DOCTORS OFFICE WOULD HAVE TO SUBMIT AN ALTERNATIVE MEDICATION THAT THE INSURANCE WOULD PAY.    Is it okay if the provider responds through MyChart: NO    PLEASE CALL BACK

## 2024-08-19 NOTE — TELEPHONE ENCOUNTER
Called the patient to notify that a generic medication was sent in for the cream that wasn't covered by the patient's insurance and to call our office if the patient is unable to get this one from the pharmacy. Patient voiced understanding.

## 2024-08-19 NOTE — TELEPHONE ENCOUNTER
I sent in a generic version of the cream she sent in so hopefully that will be covered with insurance. Please have patient let us know if it is not.

## 2024-08-21 ENCOUNTER — TELEPHONE (OUTPATIENT)
Dept: INTERNAL MEDICINE | Facility: CLINIC | Age: 49
End: 2024-08-21
Payer: COMMERCIAL

## 2024-08-21 NOTE — TELEPHONE ENCOUNTER
----- Message from Maria Victoria Evans sent at 8/21/2024 12:23 PM CDT -----  Labs are stable with the exception of elevated creatinine. I want him to increase his water intake and we will recheck labs at his next appointment.  Testosterone is low. We can discuss that at his next appointment.    Notified patient of results and instructions, patient voiced understanding.

## 2024-09-12 ENCOUNTER — OFFICE VISIT (OUTPATIENT)
Dept: INTERNAL MEDICINE | Facility: CLINIC | Age: 49
End: 2024-09-12
Payer: COMMERCIAL

## 2024-09-12 VITALS
DIASTOLIC BLOOD PRESSURE: 72 MMHG | OXYGEN SATURATION: 97 % | SYSTOLIC BLOOD PRESSURE: 134 MMHG | RESPIRATION RATE: 16 BRPM | HEIGHT: 74 IN | BODY MASS INDEX: 30.29 KG/M2 | HEART RATE: 86 BPM | TEMPERATURE: 97.5 F | WEIGHT: 236 LBS

## 2024-09-12 DIAGNOSIS — R35.1 NOCTURIA: ICD-10-CM

## 2024-09-12 DIAGNOSIS — Z12.5 SCREENING FOR PROSTATE CANCER: ICD-10-CM

## 2024-09-12 DIAGNOSIS — R39.11 URINARY HESITANCY: ICD-10-CM

## 2024-09-12 DIAGNOSIS — Z00.00 WELLNESS EXAMINATION: Primary | ICD-10-CM

## 2024-09-12 DIAGNOSIS — R10.9 BILATERAL FLANK PAIN: ICD-10-CM

## 2024-09-12 DIAGNOSIS — R79.89 LOW TESTOSTERONE IN MALE: ICD-10-CM

## 2024-09-12 DIAGNOSIS — R79.89 ELEVATED SERUM CREATININE: ICD-10-CM

## 2024-09-12 LAB
AMPHET+METHAMPHET UR QL: NEGATIVE
AMPHETAMINE INTERNAL CONTROL: NORMAL
AMPHETAMINES UR QL: NEGATIVE
BARBITURATE INTERNAL CONTROL: NORMAL
BARBITURATES UR QL SCN: NEGATIVE
BENZODIAZ UR QL SCN: NEGATIVE
BENZODIAZEPINE INTERNAL CONTROL: NORMAL
BILIRUB BLD-MCNC: NEGATIVE MG/DL
BUPRENORPHINE INTERNAL CONTROL: NORMAL
BUPRENORPHINE SERPL-MCNC: NEGATIVE NG/ML
CANNABINOIDS SERPL QL: NEGATIVE
CLARITY, POC: CLEAR
COCAINE INTERNAL CONTROL: NORMAL
COCAINE UR QL: NEGATIVE
COLOR UR: YELLOW
EXPIRATION DATE: ABNORMAL
EXPIRATION DATE: NORMAL
GLUCOSE UR STRIP-MCNC: NEGATIVE MG/DL
KETONES UR QL: NEGATIVE
LEUKOCYTE EST, POC: NEGATIVE
Lab: ABNORMAL
Lab: NORMAL
MDMA (ECSTASY) INTERNAL CONTROL: NORMAL
MDMA UR QL SCN: NEGATIVE
METHADONE INTERNAL CONTROL: NORMAL
METHADONE UR QL SCN: NEGATIVE
METHAMPHETAMINE INTERNAL CONTROL: NORMAL
MORPHINE INTERNAL CONTROL: NORMAL
MORPHINE/OPIATES SCREEN, URINE: NEGATIVE
NITRITE UR-MCNC: NEGATIVE MG/ML
OXYCODONE INTERNAL CONTROL: NORMAL
OXYCODONE UR QL SCN: NEGATIVE
PCP UR QL SCN: NEGATIVE
PH UR: 7 [PH] (ref 5–8)
PHENCYCLIDINE INTERNAL CONTROL: NORMAL
PROPOXYPH UR QL SCN: NEGATIVE
PROPOXYPHENE INTERNAL CONTROL: NORMAL
PROT UR STRIP-MCNC: ABNORMAL MG/DL
RBC # UR STRIP: NEGATIVE /UL
SP GR UR: 1.02 (ref 1–1.03)
THC INTERNAL CONTROL: NORMAL
TRICYCLIC ANTIDEPRESSANTS INTERNAL CONTROL: NORMAL
TRICYCLICS UR QL SCN: NEGATIVE
UROBILINOGEN UR QL: NORMAL

## 2024-09-12 NOTE — PROGRESS NOTES
"        Subjective     Chief Complaint:  Wellness exam, flank pain    HPI:  Patient presents today for wellness exam.  He complains of flank pain. Please see assessment and plan below.    Past Medical History:   Past Medical History:   Diagnosis Date    Drug abuse     Headache     Hypertension     Seizures      Past Surgical History:  Past Surgical History:   Procedure Laterality Date    CRANIECTOMY  2015    TESTICLE TORSION REPAIR      AS A CHILD       Allergies:  Allergies   Allergen Reactions    Bactrim [Sulfamethoxazole-Trimethoprim] Nausea And Vomiting            Medications:  Prior to Admission medications    Medication Sig Start Date End Date Taking? Authorizing Provider   amitriptyline (ELAVIL) 75 MG tablet Take 1 tablet by mouth Every Night.    Clinton De Jesus MD   amLODIPine (NORVASC) 10 MG tablet Take 1 tablet by mouth Daily.    Clinton De Jesus MD   carBAMazepine (TEGretol) 200 MG tablet TAKE 2 TABLETS BY MOUTH TWICE DAILY 8/1/19   Viviane Glasgow APRN   cetirizine (zyrTEC) 10 MG tablet Take 1 tablet by mouth Daily. 8/16/24   Maria Victoria Sevilla APRN   fluticasone (FLONASE) 50 MCG/ACT nasal spray 2 sprays into the nostril(s) as directed by provider Daily. 8/16/24   Maria Victoria Sevilla APRN   Hydrocort-Pramoxine, Perianal, (PROCTOFOAM-HS) 1-1 % rectal foam Insert 1 Application into the rectum 2 (Two) Times a Day. 8/16/24   Maria Victoria Sevilla APRN   Hydrocortisone, Perianal, (Anusol-HC) 2.5 % rectal cream Insert  into the rectum 2 (Two) Times a Day. 8/16/24   Maria Victoria Sevilla APRN   METOPROLOL TARTRATE PO Take 50 mg by mouth Daily.    Clinton De Jesus MD   potassium chloride ER (K-TAB) 20 MEQ tablet controlled-release ER tablet 1 tablet Daily. Takes 1/2 tablet daily. 7/19/24   Clinton De Jesus MD       Objective     Vital Signs: /72 (BP Location: Left arm, Patient Position: Sitting, Cuff Size: Adult)   Pulse 86   Temp 97.5 °F (36.4 °C) (Infrared)   Resp 16   Ht 188 cm (74\")   " Wt 107 kg (236 lb)   SpO2 97%   BMI 30.30 kg/m²   Physical Exam  Vitals and nursing note reviewed.   Constitutional:       General: He is not in acute distress.     Appearance: Normal appearance. He is obese.   HENT:      Right Ear: Tympanic membrane normal.      Left Ear: Tympanic membrane normal.      Mouth/Throat:      Pharynx: No posterior oropharyngeal erythema.   Cardiovascular:      Rate and Rhythm: Normal rate and regular rhythm.      Pulses: Normal pulses.      Heart sounds: Normal heart sounds. No murmur heard.  Pulmonary:      Effort: Pulmonary effort is normal. No respiratory distress.      Breath sounds: Normal breath sounds. No wheezing.   Abdominal:      General: Bowel sounds are normal. There is no distension.      Palpations: Abdomen is soft.      Tenderness: There is no abdominal tenderness.      Comments: Mild bilateral flank pain   Musculoskeletal:         General: Normal range of motion.      Cervical back: Normal range of motion.   Lymphadenopathy:      Cervical: No cervical adenopathy.   Skin:     General: Skin is warm and dry.      Capillary Refill: Capillary refill takes less than 2 seconds.      Findings: No bruising, lesion or rash.   Neurological:      Mental Status: He is alert and oriented to person, place, and time. Mental status is at baseline.      Motor: No weakness.       Results Reviewed:  Reviewed all labs obtained on 8/16/2024 including CBC, CMP, urinalysis, lipid panel, TSH, and testosterone.    Results for orders placed or performed in visit on 09/12/24   POC Urinalysis Dipstick, Automated    Specimen: Urine   Result Value Ref Range    Color Yellow Yellow, Straw, Dark Yellow, Nessa    Clarity, UA Clear Clear    Specific Gravity  1.025 1.005 - 1.030    pH, Urine 7.0 5.0 - 8.0    Leukocytes Negative Negative    Nitrite, UA Negative Negative    Protein, POC Trace (A) Negative mg/dL    Glucose, UA Negative Negative mg/dL    Ketones, UA Negative Negative    Urobilinogen, UA  Normal Normal, 0.2 E.U./dL    Bilirubin Negative Negative    Blood, UA Negative Negative    Lot Number [id number]     Expiration Date 10/31/2024    POC Medline 14 Panel Urine Drug Screen    Specimen: Urine   Result Value Ref Range    Amphetamine Screen, Urine Negative Negative    AMP INTERNAL CONTROL Passed Passed    Barbiturates Screen, Urine Negative Negative    BARBITURATE INTERNAL CONTROL Passed Passed    Buprenorphine, Screen, Urine Negative Negative    BUPRENORPHINE INTERNAL CONTROL Passed Passed    Benzodiazepine Screen, Urine Negative Negative    BENZODIAZEPINE INTERNAL CONTROL Passed Passed    Cocaine Screen, Urine Negative Negative    COCAINE INTERNAL CONTROL Passed Passed    MDMA (ECSTASY) Negative Negative    MDMA (ECSTASY) INTERNAL CONTROL Passed Passed    Methamphetamine, Ur Negative Negative    METHAMPHETAMINE INTERNAL CONTROL Passed Passed    Methadone Screen, Urine Negative Negative    METHADONE INTERNAL CONTROL Passed Passed    Morphine/Opiates Screen, Urine Negative Negative    MOR INTERNAL CONTROL Passed Passed    Oxycodone Screen, Urine Negative Negative    OXYCODONE INTERNAL CONTROL Passed Passed    Phencyclidine (PCP), Urine Negative Negative    PHENCYCLIDINE INTERNAL CONTROL Passed Passed    Propoxyphene Scree, Urine Negative Negative    PPX INTERNAL CONTROL Passed Passed    Tricyclic Antidepressants Screen Negative Negative    TCA INTERNAL CONTROL Passed Passed    THC, Screen, Urine Negative Negative    THC INTERNAL CONTROL Passed Passed    Lot Number [id number]     Expiration Date 04/17/2026        Assessment / Plan     Assessment/Plan:  Diagnoses and all orders for this visit:    1. Wellness examination (Primary)    2. Urinary frequency  -     Urinalysis With Microscopic - Urine, Clean Catch  -     Urine Culture - Urine, Urine, Clean Catch  -     POC Urinalysis Dipstick, Automated    3. Urinary hesitancy  -     PSA Screen; Future    4. Low testosterone in male  -     FSH & LH; Future  -      Testosterone; Future  -     POC Medline 14 Panel Urine Drug Screen    5. Elevated serum creatinine  -     Basic metabolic panel; Future    6. Screening for prostate cancer  -     PSA Screen; Future       Patient presents today for wellness exam.      He established care about 4 weeks ago.  Labs were obtained on 8/16/2024 which were stable with the exception of slightly elevated creatinine.  He was advised to increase his water intake due to elevated creatinine.  Will recheck BMP.      Labs also showed total testosterone 229.  Will recheck total testosterone, FSH, and LH since initial total testosterone was low.  Symptoms include fatigue and erectile dysfunction.  He is hesitant to start injections but would be willing if indicated.  We also discussed AndroGel as a possibility.  UDS was obtained today.  Would need to obtain CSA if we proceed with testosterone replacement.  However, may need to consider holding off on replacement given the issues detailed below.    He complains of bilateral flank pain.  His girlfriend is present with him today who states that he strains to pee.  She specifically states that he grunts at times to get his stream started.  Once he begins urinating he no longer has to strain.  He also experiences urgency throughout the night.  He reports getting up to urinate 2-3 times.  He denies dysuria.  BPH runs in his family.  His girlfriend has given him Azo for his symptoms and his flank discomfort seem to improve while taking it.  Urinalysis was obtained in the clinic today which only showed trace protein.  No leukocytes, nitrite, blood.  Will send for microscopic exam along with urine culture.  Will also check PSA.  If PSA is normal we may need to consider proceeding with STD testing or may consider CT of abdomen and pelvis with urogram.    Preventative counseling provided. Body mass index is 30.3 kg/m².  Recommend maintaining healthy diet and being physically active.  Referral was sent to [facility] at  his last office visit for colon cancer screening.  He has an upcoming appointment on 10/21.  Will obtain PSA today due to the symptoms mentioned above. Immunizations up-to-date.  Recommend annual dental and vision screening.    Return in about 6 months (around 3/12/2025). unless patient needs to be seen sooner or acute issues arise.    I have discussed the patient results/orders and and plan/recommendation with them at today's visit.      Maria Victoria Sevilla, ROBERT   09/12/2024

## 2024-09-14 LAB
APPEARANCE UR: ABNORMAL
BACTERIA #/AREA URNS HPF: NORMAL /HPF
BACTERIA UR CULT: NORMAL
BACTERIA UR CULT: NORMAL
BILIRUB UR QL STRIP: NEGATIVE
CASTS URNS MICRO: NORMAL
COLOR UR: YELLOW
EPI CELLS #/AREA URNS HPF: NORMAL /HPF
GLUCOSE UR QL STRIP: NEGATIVE
HGB UR QL STRIP: NEGATIVE
KETONES UR QL STRIP: ABNORMAL
LEUKOCYTE ESTERASE UR QL STRIP: NEGATIVE
NITRITE UR QL STRIP: NEGATIVE
PH UR STRIP: 6.5 [PH] (ref 5–8)
PROT UR QL STRIP: ABNORMAL
RBC #/AREA URNS HPF: NORMAL /HPF
SP GR UR STRIP: ABNORMAL (ref 1–1.03)
UROBILINOGEN UR STRIP-MCNC: ABNORMAL MG/DL
WBC #/AREA URNS HPF: NORMAL /HPF

## 2024-09-16 ENCOUNTER — TELEPHONE (OUTPATIENT)
Dept: INTERNAL MEDICINE | Facility: CLINIC | Age: 49
End: 2024-09-16

## 2024-09-16 NOTE — TELEPHONE ENCOUNTER
Caller: Chirag Mata    Relationship: Self    Best call back number: [phone]    What is the best time to reach you: ANYTIME     Who are you requesting to speak with (clinical staff, provider,  specific staff member): PROVIDER OR CLINICAL STAFF         What was the call regarding: PATIENT REQUESTING A CALL BACK TO DISCUSS QUESTIONS HE HAS ON SOME LAB TEST RESULTS     Is it okay if the provider responds through MyChart: PREFERS A CALL BACK

## 2024-09-18 ENCOUNTER — TELEPHONE (OUTPATIENT)
Dept: INTERNAL MEDICINE | Facility: CLINIC | Age: 49
End: 2024-09-18

## 2024-09-18 DIAGNOSIS — R39.11 URINARY HESITANCY: Primary | ICD-10-CM

## 2024-09-18 DIAGNOSIS — R10.9 BILATERAL FLANK PAIN: ICD-10-CM

## 2024-09-18 DIAGNOSIS — R35.1 NOCTURIA: ICD-10-CM

## 2024-09-18 NOTE — TELEPHONE ENCOUNTER
Hub staff attempted to follow warm transfer process and was unsuccessful     Caller: Chirag Mata    Relationship to patient: Self    Best call back number: [phone]     Patient is needing:     PATIENT STATES HE MISSED A PHONE CALL FROM 09.18.24.

## 2024-09-18 NOTE — TELEPHONE ENCOUNTER
Patient has been notified of results, patient voiced understanding and would like to proceed with CT and referral to Urology.   Provider has been notified.

## 2024-10-02 ENCOUNTER — TELEPHONE (OUTPATIENT)
Dept: INTERNAL MEDICINE | Facility: CLINIC | Age: 49
End: 2024-10-02

## 2024-10-02 DIAGNOSIS — R79.89 LOW TESTOSTERONE IN MALE: Primary | ICD-10-CM

## 2024-10-02 DIAGNOSIS — N52.9 ERECTILE DYSFUNCTION, UNSPECIFIED ERECTILE DYSFUNCTION TYPE: ICD-10-CM

## 2024-10-02 RX ORDER — SILDENAFIL 50 MG/1
50 TABLET, FILM COATED ORAL DAILY PRN
Qty: 30 TABLET | Refills: 0 | Status: SHIPPED | OUTPATIENT
Start: 2024-10-02

## 2024-10-02 NOTE — TELEPHONE ENCOUNTER
I will send in sildenafil to the pharmacy.  If he is still having flank pain, urgency, or straining to urinate we need to obtain a KUB at least.

## 2024-10-02 NOTE — TELEPHONE ENCOUNTER
Caller: Chirag Mata    Relationship: Self    Best call back number: [phone]    What medication are you requesting: VIAGRA OR SOMETHING LIKE IT     What are your current symptoms: LOW TESTOSTERONE LEVELS       If a prescription is needed, what is your preferred pharmacy and phone number: [facility] [address] [place] [address] [address] [facility] [phone] [facility] [phone]

## 2024-10-02 NOTE — TELEPHONE ENCOUNTER
I have sent a message to Dale as he stated in referral it was denied, however in chart there is one approved - getting clarification

## 2024-10-02 NOTE — TELEPHONE ENCOUNTER
"  Caller: Adolfo Chirag    Relationship: Self    Best call back number: [phone]     What is the best time to reach you: ANY     Who are you requesting to speak with (clinical staff, provider,  specific staff member): NONE SPECIFIED     What was the call regarding:     PATIENT STATES HIS CAT SCAN APPOINTMENT WAS CANCELLED DUE TO \"NON PREVIOUS TESTING.\" PLEASE CONTACT PATIENT REGARDING THIS APPOINTMENT.      Is it okay if the provider responds through [facility]: PLEASE CALL     "

## 2024-10-03 ENCOUNTER — PATIENT ROUNDING (BHMG ONLY) (OUTPATIENT)
Dept: GASTROENTEROLOGY | Facility: CLINIC | Age: 49
End: 2024-10-03
Payer: MEDICAID

## 2024-10-03 ENCOUNTER — OFFICE VISIT (OUTPATIENT)
Dept: GASTROENTEROLOGY | Facility: CLINIC | Age: 49
End: 2024-10-03
Payer: MEDICAID

## 2024-10-03 ENCOUNTER — HOSPITAL ENCOUNTER (OUTPATIENT)
Dept: GENERAL RADIOLOGY | Facility: HOSPITAL | Age: 49
Discharge: HOME | End: 2024-10-03
Payer: COMMERCIAL

## 2024-10-03 VITALS
BODY MASS INDEX: 29.9 KG/M2 | HEIGHT: 74 IN | TEMPERATURE: 98 F | OXYGEN SATURATION: 100 % | WEIGHT: 233 LBS | HEART RATE: 74 BPM | SYSTOLIC BLOOD PRESSURE: 130 MMHG | DIASTOLIC BLOOD PRESSURE: 74 MMHG

## 2024-10-03 DIAGNOSIS — Z98.890 HISTORY OF RESECTION OF MENINGIOMA: ICD-10-CM

## 2024-10-03 DIAGNOSIS — Z86.0100 HISTORY OF COLON POLYPS: ICD-10-CM

## 2024-10-03 DIAGNOSIS — K62.5 RECTAL BLEED: ICD-10-CM

## 2024-10-03 DIAGNOSIS — G40.909 SEIZURE DISORDER: Primary | ICD-10-CM

## 2024-10-03 DIAGNOSIS — R35.1 NOCTURIA: ICD-10-CM

## 2024-10-03 DIAGNOSIS — R39.11 URINARY HESITANCY: ICD-10-CM

## 2024-10-03 DIAGNOSIS — R10.9 BILATERAL FLANK PAIN: ICD-10-CM

## 2024-10-03 DIAGNOSIS — R39.11 URINARY HESITANCY: Primary | ICD-10-CM

## 2024-10-03 DIAGNOSIS — Z86.0101 HISTORY OF ADENOMATOUS POLYP OF COLON: Primary | ICD-10-CM

## 2024-10-03 DIAGNOSIS — Z86.03 HISTORY OF RESECTION OF MENINGIOMA: ICD-10-CM

## 2024-10-03 DIAGNOSIS — G93.5 CHIARI I MALFORMATION: ICD-10-CM

## 2024-10-03 PROCEDURE — 74018 RADEX ABDOMEN 1 VIEW: CPT

## 2024-10-03 NOTE — PROGRESS NOTES
Chief Complaint   Patient presents with    Hemorrhoids     Having rectal bleeding some discomfort never had endo       PCP: Maria Victoria Sevilla APRN  REFER: Maria Victoria Sevilla APRN    Subjective     HPI    Chirag Mata presents with complains of intermittent bright red blood per rectum.  Blood observed in toilet bowel, toilet paper, on underwear.  Bowels move daily, consistency of stool varies.  Blood attributed to hemorrhoids, occurring for years.  No rectal pain, no abdominal pain.  No weight loss.  Does not consume caffeine.   Colonoscopy many years ago, polyps removed (per patient, no record to review).         Past Medical History:   Diagnosis Date    Drug abuse     Headache     Hypertension     Seizures        Past Surgical History:   Procedure Laterality Date    CRANIECTOMY  2015    TESTICLE TORSION REPAIR      AS A CHILD       Outpatient Medications Marked as Taking for the 10/3/24 encounter (Office Visit) with Aric Lange APRN   Medication Sig Dispense Refill    amitriptyline (ELAVIL) 75 MG tablet Take 1 tablet by mouth Every Night.      amLODIPine (NORVASC) 10 MG tablet Take 1 tablet by mouth Daily.      carBAMazepine (TEGretol) 200 MG tablet TAKE 2 TABLETS BY MOUTH TWICE DAILY 120 tablet 5    cetirizine (zyrTEC) 10 MG tablet Take 1 tablet by mouth Daily. 30 tablet 0    fluticasone (FLONASE) 50 MCG/ACT nasal spray 2 sprays into the nostril(s) as directed by provider Daily. 18.2 g 0    Hydrocortisone, Perianal, (Anusol-HC) 2.5 % rectal cream Insert  into the rectum 2 (Two) Times a Day. 28 g 0    METOPROLOL TARTRATE PO Take 50 mg by mouth Daily.      potassium chloride ER (K-TAB) 20 MEQ tablet controlled-release ER tablet 1 tablet Daily. Takes 1/2 tablet daily.      sildenafil (VIAGRA) 50 MG tablet Take 1 tablet by mouth Daily As Needed for Erectile Dysfunction. 30 tablet 0       Allergies   Allergen Reactions    Bactrim [Sulfamethoxazole-Trimethoprim] Nausea And Vomiting              Social  "History     Socioeconomic History    Marital status: Single   Tobacco Use    Smoking status: Every Day     Current packs/day: 1.00     Average packs/day: 1 pack/day for 20.0 years (20.0 ttl pk-yrs)     Types: Cigarettes    Smokeless tobacco: Never    Tobacco comments:     Smokes less than 1 pack per day.   Vaping Use    Vaping status: Never Used   Substance and Sexual Activity    Alcohol use: Yes     Alcohol/week: 4.0 standard drinks of alcohol     Types: 4 Cans of beer per week     Comment: ocassionally    Drug use: Not Currently     Types: Hydrocodone, Marijuana, \"Crack\" cocaine     Comment: HX OF DRUG ABUSE, Xanax. Patient has not done drugs in 5 years.    Sexual activity: Yes     Partners: Female       Review of Systems   Constitutional:  Negative for fever and unexpected weight change.   HENT:  Negative for trouble swallowing.    Respiratory:  Negative for shortness of breath.    Cardiovascular:  Negative for chest pain.   Gastrointestinal:  Positive for anal bleeding. Negative for abdominal pain.       Objective     Vitals:    10/03/24 1027   BP: 130/74   Pulse: 74   Temp: 98 °F (36.7 °C)   SpO2: 100%   Weight: 106 kg (233 lb)   Height: 188 cm (74\")     Body mass index is 29.92 kg/m².    Physical Exam  Constitutional:       Appearance: Normal appearance. He is well-developed.   Eyes:      General: No scleral icterus.  Cardiovascular:      Heart sounds: Normal heart sounds. No murmur heard.  Pulmonary:      Effort: Pulmonary effort is normal.   Abdominal:      General: Bowel sounds are normal. There is no distension.      Palpations: Abdomen is soft.      Tenderness: There is no abdominal tenderness. There is no guarding.   Skin:     General: Skin is warm and dry.      Coloration: Skin is not jaundiced.   Neurological:      Mental Status: He is alert.   Psychiatric:         Behavior: Behavior is cooperative.         Imaging Results (Most Recent)       None            Body mass index is 29.92 " kg/m².    Assessment & Plan     Diagnoses and all orders for this visit:    1. History of adenomatous polyp of colon (Primary)    2. Rectal bleed  -     Case Request; Standing  -     Implement Anesthesia Orders Day of Procedure; Standing  -     Obtain Informed Consent; Standing  -     Case Request    3. History of colon polyps         COLONOSCOPY WITH ANESTHESIA (N/A)    No history of liver disease, I recommend colonoscopy due to history of polyps as well as identification of source of bleed.      Miralax to loosen stool if needed, encouraged fiber, continued water consumption    Referral to general surgery if needed post colonoscopy   Insurance did not cover CT scan as ordered by PCP, he will have KUB today    I recommend to use anusol cream more consistently, continue to avoid caffeine.     Advised pt to stop use of NSAIDs, Fish Oil, and MV 5 days prior to procedure, per Dr Garcia protocol.  Tylenol based products are ok to take.  Pt verbalized understanding.      All risks, benefits, alternatives, and indications of colonoscopy procedure have been discussed with the patient. Risks to include perforation of the colon requiring possible surgery or colostomy, risk of bleeding from biopsies or removal of colon tissue, possibility of missing a colon polyp or cancer, or adverse drug reaction.  Benefits to include the diagnosis and management of disease of the colon and rectum. Alternatives to include barium enema, radiographic evaluation, lab testing or no intervention. Pt verbalizes understanding and agrees to proceed with procedure.        Aric Lange, APRN  10/03/24          There are no Patient Instructions on file for this visit.

## 2024-10-04 ENCOUNTER — TELEPHONE (OUTPATIENT)
Dept: GASTROENTEROLOGY | Facility: CLINIC | Age: 49
End: 2024-10-04
Payer: MEDICAID

## 2024-10-08 ENCOUNTER — TELEPHONE (OUTPATIENT)
Dept: INTERNAL MEDICINE | Facility: CLINIC | Age: 49
End: 2024-10-08
Payer: MEDICAID

## 2024-10-08 DIAGNOSIS — N52.9 ERECTILE DYSFUNCTION, UNSPECIFIED ERECTILE DYSFUNCTION TYPE: ICD-10-CM

## 2024-10-08 DIAGNOSIS — R79.89 LOW TESTOSTERONE IN MALE: ICD-10-CM

## 2024-10-08 RX ORDER — SILDENAFIL 50 MG/1
50 TABLET, FILM COATED ORAL DAILY PRN
Qty: 30 TABLET | Refills: 5 | Status: SHIPPED | OUTPATIENT
Start: 2024-10-08

## 2024-10-08 NOTE — TELEPHONE ENCOUNTER
Caller: Chirag Mata    Relationship: Self    Best call back number:     [phone]        Which medication are you concerned about:     sildenafil (VIAGRA) 50 MG tablet       What are your concerns: PHARMACY NEEDS A PRIOR AUTH      PATIENT COMPLETLEY OUT OF HIS MEDICATION

## 2024-10-08 NOTE — TELEPHONE ENCOUNTER
Caller: Chirag Mata    Relationship: Self    Best call back number:     [phone]        Which medication are you concerned about:     sildenafil (VIAGRA) 50 MG tablet       What are your concerns: PHARMACY NEEDS A PRIOR AUTH      PATIENT COMPLETLEY OUT OF HIS MEDICATION     PLEASE CALL PATIENT WHEN THIS IS COMPLETED

## 2024-10-24 NOTE — PROGRESS NOTES
Subjective    Mr. Mata is 49 y.o. male    Chief Complaint: Difficulty initiating urine stream and urine frequency    History of Present Illness    49-year-old male new patient referred by PCP due to report of difficulty initiating urine stream and increased urine frequency.  Reporting at times having to really push to initiate flow.  Nocturia reported x 1.  Denies feeling of incomplete bladder emptying.  Reports stream adequate once family gets going.  Family history of prostate CA and BPH in his uncles.  Hx of testicular torsion in middle school.    History of kidney stones with spontaneous passage.  Patient with new complaint of right sided low to mid back pain with radiating down even into the right hip by area.  Previously underwent KUB imaging no renal or ureteral stones seen.  PSA: 0.837 9/18/24   0.68 2/22/19  The following portions of the patient's history were reviewed and updated as appropriate: allergies, current medications, past family history, past medical history, past social history, past surgical history and problem list.    Review of Systems   Constitutional:  Negative for chills and fever.   Gastrointestinal:  Negative for abdominal pain, anal bleeding and blood in stool.   Genitourinary:  Positive for frequency and urgency. Negative for dysuria and hematuria.         Current Outpatient Medications:     amitriptyline (ELAVIL) 75 MG tablet, Take 1 tablet by mouth Every Night., Disp: , Rfl:     amLODIPine (NORVASC) 10 MG tablet, Take 1 tablet by mouth Daily., Disp: , Rfl:     carBAMazepine (TEGretol) 200 MG tablet, TAKE 2 TABLETS BY MOUTH TWICE DAILY, Disp: 120 tablet, Rfl: 5    cetirizine (zyrTEC) 10 MG tablet, Take 1 tablet by mouth Daily., Disp: 30 tablet, Rfl: 0    fluticasone (FLONASE) 50 MCG/ACT nasal spray, 2 sprays into the nostril(s) as directed by provider Daily., Disp: 18.2 g, Rfl: 0    Hydrocort-Pramoxine, Perianal, (PROCTOFOAM-HS) 1-1 % rectal foam, Insert 1 Application into the  "rectum 2 (Two) Times a Day., Disp: 10 g, Rfl: 0    Hydrocortisone, Perianal, (Anusol-HC) 2.5 % rectal cream, Insert  into the rectum 2 (Two) Times a Day., Disp: 28 g, Rfl: 0    METOPROLOL TARTRATE PO, Take 50 mg by mouth Daily., Disp: , Rfl:     potassium chloride ER (K-TAB) 20 MEQ tablet controlled-release ER tablet, 1 tablet Daily. Takes 1/2 tablet daily., Disp: , Rfl:     sildenafil (VIAGRA) 50 MG tablet, Take 1 tablet by mouth Daily As Needed for Erectile Dysfunction., Disp: 30 tablet, Rfl: 5    tamsulosin (FLOMAX) 0.4 MG capsule 24 hr capsule, Take 1 capsule by mouth Every Night for 360 days., Disp: 30 capsule, Rfl: 11    Past Medical History:   Diagnosis Date    Drug abuse     Headache     Hypertension     Seizures        Past Surgical History:   Procedure Laterality Date    CRANIECTOMY  2015    TESTICLE TORSION REPAIR      AS A CHILD       Social History     Socioeconomic History    Marital status: Single   Tobacco Use    Smoking status: Every Day     Current packs/day: 1.00     Average packs/day: 1 pack/day for 20.0 years (20.0 ttl pk-yrs)     Types: Cigarettes    Smokeless tobacco: Never    Tobacco comments:     Smokes less than 1 pack per day.   Vaping Use    Vaping status: Never Used   Substance and Sexual Activity    Alcohol use: Yes     Alcohol/week: 4.0 standard drinks of alcohol     Types: 4 Cans of beer per week     Comment: ocassionally    Drug use: Not Currently     Types: Hydrocodone, Marijuana, \"Crack\" cocaine     Comment: HX OF DRUG ABUSE, Xanax. Patient has not done drugs in 5 years.    Sexual activity: Yes     Partners: Female       Family History   Problem Relation Age of Onset    Hypertension Mother     Colon cancer Neg Hx     Colon polyps Neg Hx     Esophageal cancer Neg Hx        Objective    Temp 97.8 °F (36.6 °C)   Ht 188 cm (74.02\")   Wt 108 kg (238 lb 3.2 oz)   BMI 30.57 kg/m²     Physical Exam  Constitutional:       Appearance: Normal appearance.   Abdominal:      Tenderness: There " is no right CVA tenderness or left CVA tenderness.   Genitourinary:     Comments: SIA attempted-was unable to feel the entire prostate was barely able to fill the tip due to body habitus  Skin:     General: Skin is warm and dry.   Neurological:      Mental Status: He is alert and oriented to person, place, and time.   Psychiatric:         Mood and Affect: Mood normal.         Behavior: Behavior normal.             Results for orders placed or performed in visit on 10/23/24   POC Urinalysis Dipstick, Multipro    Collection Time: 10/25/24  3:00 PM    Specimen: Urine   Result Value Ref Range    Color Yellow Yellow, Straw, Dark Yellow, Nessa    Clarity, UA Clear Clear    Glucose, UA Negative Negative mg/dL    Bilirubin Negative Negative    Ketones, UA Negative Negative    Specific Gravity  1.015 1.005 - 1.030    Blood, UA Negative Negative    pH, Urine 6.5 5.0 - 8.0    Protein, POC Negative Negative mg/dL    Urobilinogen, UA 0.2 E.U./dL Normal, 0.2 E.U./dL    Nitrite, UA Negative Negative    Leukocytes Negative Negative   Estimation of residual urine via abdominal ultrasound  Residual Urine: 14 ml  Indication: Hesitancy  Position: Supine  Examination: Incremental scanning of the suprapubic area using 3 MHz transducer using copious amounts of acoustic gel.   Findings: An anechoic area was demonstrated which represented the bladder, with measurement of residual urine as noted. I inspected this myself. In that the residual urine was stable or insignificant, no treatment will be necessary at this time.    KUB independent review    A KUB is available for me to review today.  The image is inspected for a bowel gas pattern and the general bone structure of the spine and pelvis. The kidneys are then inspected closely.  Renal outline is noted if identifiable. The kidney, collecting system, and anticipated path of the ureter are examined for calcifications including those in the true pelvis.  This film reveals:    On the right  there are no calcificaitons seen in the kidney or the expected course of the ureter. .    On the left there are no calcificaitons seen in the kidney or the expected course of the ureter. .    Assessment and Plan    Diagnoses and all orders for this visit:    1. Right flank pain (Primary)  -     CT Abdomen Pelvis Without Contrast; Future    2. Benign prostatic hyperplasia with lower urinary tract symptoms, symptom details unspecified  -     tamsulosin (FLOMAX) 0.4 MG capsule 24 hr capsule; Take 1 capsule by mouth Every Night for 360 days.  Dispense: 30 capsule; Refill: 11      Recommend starting on tamsulosin 0.4 mg nightly for report of bothersome LUTS    In regards to the history of kidney stones with the new onset over the last month of right mid to low back pain radiating around and down and negative KUB recommend further evaluation with CT stone protocol    Follow-up 3 months

## 2024-10-25 ENCOUNTER — OFFICE VISIT (OUTPATIENT)
Dept: UROLOGY | Facility: CLINIC | Age: 49
End: 2024-10-25
Payer: MEDICAID

## 2024-10-25 ENCOUNTER — PATIENT ROUNDING (BHMG ONLY) (OUTPATIENT)
Dept: UROLOGY | Facility: CLINIC | Age: 49
End: 2024-10-25
Payer: MEDICAID

## 2024-10-25 VITALS — TEMPERATURE: 97.8 F | BODY MASS INDEX: 30.57 KG/M2 | WEIGHT: 238.2 LBS | HEIGHT: 74 IN

## 2024-10-25 DIAGNOSIS — R10.9 RIGHT FLANK PAIN: Primary | ICD-10-CM

## 2024-10-25 DIAGNOSIS — N40.1 BENIGN PROSTATIC HYPERPLASIA WITH LOWER URINARY TRACT SYMPTOMS, SYMPTOM DETAILS UNSPECIFIED: ICD-10-CM

## 2024-10-25 RX ORDER — TAMSULOSIN HYDROCHLORIDE 0.4 MG/1
1 CAPSULE ORAL NIGHTLY
Qty: 30 CAPSULE | Refills: 11 | Status: SHIPPED | OUTPATIENT
Start: 2024-10-25 | End: 2025-10-20

## 2024-10-25 NOTE — PROGRESS NOTES
October 25, 2024    Hello, may I speak with Chirag Mata?    My name is Carolina    I am  with [facility]  [address], [address]  [facility] [address] [phone].    Before we get started may I verify your date of birth? 1975    I am calling to officially welcome you to our practice and ask about your recent visit. Is this a good time to talk? yes    Tell me about your visit with us. What things went well?  It was a good first visit       We're always looking for ways to make our patients' experiences even better. Do you have recommendations on ways we may improve?  no    Overall were you satisfied with your first visit to our practice? yes       I appreciate you taking the time to speak with me today. Is there anything else I can do for you? no      Thank you, and have a great day.

## 2024-10-29 RX ORDER — AMITRIPTYLINE HYDROCHLORIDE 75 MG/1
75 TABLET ORAL NIGHTLY
Qty: 90 TABLET | Refills: 1 | Status: SHIPPED | OUTPATIENT
Start: 2024-10-29

## 2024-10-29 RX ORDER — AMLODIPINE BESYLATE 10 MG/1
10 TABLET ORAL DAILY
Qty: 30 TABLET | Refills: 11 | Status: SHIPPED | OUTPATIENT
Start: 2024-10-29

## 2024-10-31 ENCOUNTER — TELEPHONE (OUTPATIENT)
Dept: INTERNAL MEDICINE | Facility: CLINIC | Age: 49
End: 2024-10-31

## 2024-10-31 NOTE — TELEPHONE ENCOUNTER
Caller: Chirag Mata    Relationship to patient: Self    Best call back number:     [phone]       Patient is needing: PATIENT STATES THAT HE USUALLY TAKES 50 MG OF THE AMITRIPTYLINE AND 10 MG WAS CALLED IN. WAS SENT TO THE WRONG PHARMACY, AND HE WOULD LIKE IT TO BE SENT TO [facility] [place]. PLEASE CALL PATIENT WITH ANY QUESTIONS.

## 2024-10-31 NOTE — TELEPHONE ENCOUNTER
Called and spoke with pt- let him know the amlodipine sent to [facility] is 10 but the amitriptyline 75mg was sent to [facility]

## 2024-11-04 ENCOUNTER — ANESTHESIA (OUTPATIENT)
Dept: GASTROENTEROLOGY | Facility: HOSPITAL | Age: 49
End: 2024-11-04
Payer: COMMERCIAL

## 2024-11-04 ENCOUNTER — HOSPITAL ENCOUNTER (OUTPATIENT)
Facility: HOSPITAL | Age: 49
Setting detail: HOSPITAL OUTPATIENT SURGERY
Discharge: HOME | End: 2024-11-04
Attending: INTERNAL MEDICINE | Admitting: INTERNAL MEDICINE
Payer: COMMERCIAL

## 2024-11-04 ENCOUNTER — TELEPHONE (OUTPATIENT)
Dept: GASTROENTEROLOGY | Facility: CLINIC | Age: 49
End: 2024-11-04
Payer: MEDICAID

## 2024-11-04 ENCOUNTER — ANESTHESIA EVENT (OUTPATIENT)
Dept: GASTROENTEROLOGY | Facility: HOSPITAL | Age: 49
End: 2024-11-04
Payer: COMMERCIAL

## 2024-11-04 VITALS
OXYGEN SATURATION: 99 % | HEART RATE: 83 BPM | WEIGHT: 232 LBS | HEIGHT: 74 IN | SYSTOLIC BLOOD PRESSURE: 124 MMHG | RESPIRATION RATE: 14 BRPM | DIASTOLIC BLOOD PRESSURE: 98 MMHG | TEMPERATURE: 96.7 F | BODY MASS INDEX: 29.77 KG/M2

## 2024-11-04 DIAGNOSIS — K62.5 RECTAL BLEED: ICD-10-CM

## 2024-11-04 PROCEDURE — 25010000002 PROPOFOL 10 MG/ML EMULSION: Performed by: NURSE ANESTHETIST, CERTIFIED REGISTERED

## 2024-11-04 PROCEDURE — 25010000002 LIDOCAINE PF 2% 2 % SOLUTION: Performed by: NURSE ANESTHETIST, CERTIFIED REGISTERED

## 2024-11-04 PROCEDURE — 45385 COLONOSCOPY W/LESION REMOVAL: CPT | Performed by: INTERNAL MEDICINE

## 2024-11-04 RX ORDER — LIDOCAINE HYDROCHLORIDE 20 MG/ML
INJECTION, SOLUTION EPIDURAL; INFILTRATION; INTRACAUDAL; PERINEURAL AS NEEDED
Status: DISCONTINUED | OUTPATIENT
Start: 2024-11-04 | End: 2024-11-04 | Stop reason: SURG

## 2024-11-04 RX ORDER — PROPOFOL 10 MG/ML
VIAL (ML) INTRAVENOUS AS NEEDED
Status: DISCONTINUED | OUTPATIENT
Start: 2024-11-04 | End: 2024-11-04 | Stop reason: SURG

## 2024-11-04 RX ORDER — LIDOCAINE HYDROCHLORIDE 10 MG/ML
0.5 INJECTION, SOLUTION EPIDURAL; INFILTRATION; INTRACAUDAL; PERINEURAL ONCE AS NEEDED
Status: DISCONTINUED | OUTPATIENT
Start: 2024-11-04 | End: 2024-11-04 | Stop reason: HOSPADM

## 2024-11-04 RX ORDER — SODIUM CHLORIDE 0.9 % (FLUSH) 0.9 %
10 SYRINGE (ML) INJECTION AS NEEDED
Status: DISCONTINUED | OUTPATIENT
Start: 2024-11-04 | End: 2024-11-04 | Stop reason: HOSPADM

## 2024-11-04 RX ORDER — SODIUM CHLORIDE 9 MG/ML
500 INJECTION, SOLUTION INTRAVENOUS CONTINUOUS PRN
Status: DISCONTINUED | OUTPATIENT
Start: 2024-11-04 | End: 2024-11-04 | Stop reason: HOSPADM

## 2024-11-04 RX ADMIN — Medication 10 ML: at 09:20

## 2024-11-04 RX ADMIN — LIDOCAINE HYDROCHLORIDE 50 MG: 20 INJECTION, SOLUTION EPIDURAL; INFILTRATION; INTRACAUDAL; PERINEURAL at 10:10

## 2024-11-04 RX ADMIN — PROPOFOL 450 MG: 10 INJECTION, EMULSION INTRAVENOUS at 10:10

## 2024-11-04 NOTE — ANESTHESIA PREPROCEDURE EVALUATION
Anesthesia Evaluation     Patient summary reviewed   no history of anesthetic complications:   NPO Solid Status: > 8 hours             Airway   Mallampati: II  Dental      Pulmonary    (+) a smoker,sleep apnea  Cardiovascular   Exercise tolerance: excellent (>7 METS)    (+) hypertension      Neuro/Psych  (+) seizures well controlled  GI/Hepatic/Renal/Endo    (+) obesity    Musculoskeletal     Abdominal    Substance History      OB/GYN          Other                    Anesthesia Plan    ASA 2     MAC       Anesthetic plan, risks, benefits, and alternatives have been provided, discussed and informed consent has been obtained with: patient.    CODE STATUS:

## 2024-11-04 NOTE — ANESTHESIA POSTPROCEDURE EVALUATION
Patient: Chirag Mata    Procedure Summary       Date: 11/04/24 Room / Location: [facility] ENDOSCOPY 4 / [facility]    Anesthesia Start: 1008 Anesthesia Stop: 1033    Procedure: COLONOSCOPY WITH ANESTHESIA Diagnosis:       Rectal bleed      (Rectal bleed [K62.5])    Surgeons: Charli Garcia DO Provider: Pricila Daniel CRNA    Anesthesia Type: MAC ASA Status: 2            Anesthesia Type: MAC    Vitals  Vitals Value Taken Time   /86 11/04/24 1033   Temp     Pulse 96 11/04/24 1035   Resp 14 11/04/24 1033   SpO2 97 % 11/04/24 1035   Vitals shown include unfiled device data.        Post Anesthesia Care and Evaluation    Patient location during evaluation: PHASE II  Patient participation: complete - patient participated  Level of consciousness: awake and alert  Pain management: adequate    Airway patency: patent  Anesthetic complications: No anesthetic complications  PONV Status: none  Cardiovascular status: acceptable  Respiratory status: acceptable  Hydration status: acceptable

## 2024-11-04 NOTE — H&P
"[facility]  Pre Procedure History & Physical    Chief Complaint:   Screening    Subjective     HPI:   Screening    Past Medical History:   Past Medical History:   Diagnosis Date    Drug abuse     Headache     Hypertension     Seizures        Past Surgical History:  Past Surgical History:   Procedure Laterality Date    CRANIECTOMY  2015    TESTICLE TORSION REPAIR      AS A CHILD       Family History:  Family History   Problem Relation Age of Onset    Hypertension Mother     Colon cancer Neg Hx     Colon polyps Neg Hx     Esophageal cancer Neg Hx        Social History:   reports that he has been smoking cigarettes. He has a 20 pack-year smoking history. He has never used smokeless tobacco. He reports current alcohol use of about 4.0 standard drinks of alcohol per week. He reports that he does not currently use drugs after having used the following drugs: Hydrocodone, Marijuana, and \"Crack\" cocaine.    Medications:   Prior to Admission medications    Medication Sig Start Date End Date Taking? Authorizing Provider   fluticasone (FLONASE) 50 MCG/ACT nasal spray 2 sprays into the nostril(s) as directed by provider Daily. 8/16/24  Yes Maria Victoria Sevilla APRN   amitriptyline (ELAVIL) 75 MG tablet Take 1 tablet by mouth Every Night. 10/29/24   Maria Victoria Sevilla APRN   amLODIPine (NORVASC) 10 MG tablet Take 1 tablet by mouth Daily. 10/29/24   Maria Victoria Sevilla APRN   carBAMazepine (TEGretol) 200 MG tablet TAKE 2 TABLETS BY MOUTH TWICE DAILY 8/1/19   Viviane Glasgow APRN   cetirizine (zyrTEC) 10 MG tablet Take 1 tablet by mouth Daily. 8/16/24   Maria Victoria Sevilla APRN   Hydrocort-Pramoxine, Perianal, (PROCTOFOAM-HS) 1-1 % rectal foam Insert 1 Application into the rectum 2 (Two) Times a Day. 8/16/24   Maria Victoria Sevilla APRN   Hydrocortisone, Perianal, (Anusol-HC) 2.5 % rectal cream Insert  into the rectum 2 (Two) Times a Day. 8/16/24   Maria Victoria Sevilla APRN   METOPROLOL TARTRATE PO Take 50 mg by mouth " "Daily.    Provider, MD Clinton   potassium chloride ER (K-TAB) 20 MEQ tablet controlled-release ER tablet 1 tablet Daily. Takes 1/2 tablet daily. 7/19/24   Clinton De Jesus MD   sildenafil (VIAGRA) 50 MG tablet Take 1 tablet by mouth Daily As Needed for Erectile Dysfunction. 10/8/24   ETHAN Matute DO   tamsulosin (FLOMAX) 0.4 MG capsule 24 hr capsule Take 1 capsule by mouth Every Night for 360 days. 10/25/24 10/20/25  Haley Doyle, ROBERT       Allergies:  Bactrim [sulfamethoxazole-trimethoprim]    ROS:    General: Weight stable  Resp: No SOA  Cardiovascular: No CP    Objective     Blood pressure 124/98, pulse 83, temperature 96.7 °F (35.9 °C), temperature source Temporal, resp. rate 14, height 188 cm (74\"), weight 105 kg (232 lb), SpO2 99%.    Physical Exam   Constitutional: Pt is oriented to person, place, and in no distress.   HENT: Mouth/Throat: Oropharynx is clear.   Cardiovascular: Normal rate, regular rhythm.    Pulmonary/Chest: Effort normal. No respiratory distress. No  wheezes.   Abdominal: Soft. Non-distended.  Skin: Skin is warm and dry.   Psychiatric: Mood, memory, affect and judgment appear normal.     Assessment & Plan     Diagnosis:  Screening    Anticipated Surgical Procedure:  C-scope    The risks, benefits, and alternatives of this procedure have been discussed with the patient or the responsible party- the patient understands and agrees to proceed.        "

## 2025-01-12 DIAGNOSIS — N52.9 ERECTILE DYSFUNCTION, UNSPECIFIED ERECTILE DYSFUNCTION TYPE: ICD-10-CM

## 2025-01-12 DIAGNOSIS — R79.89 LOW TESTOSTERONE IN MALE: ICD-10-CM

## 2025-01-13 RX ORDER — SILDENAFIL 50 MG/1
50 TABLET, FILM COATED ORAL DAILY PRN
Qty: 30 TABLET | Refills: 5 | Status: SHIPPED | OUTPATIENT
Start: 2025-01-13

## 2025-01-15 ENCOUNTER — TELEPHONE (OUTPATIENT)
Dept: INTERNAL MEDICINE | Facility: CLINIC | Age: 50
End: 2025-01-15

## 2025-01-15 NOTE — TELEPHONE ENCOUNTER
Caller: Chirag Mata    Relationship: Self    Best call back number: [phone]     What is the best time to reach you: ANYTIME    Who are you requesting to speak with (clinical staff, provider,  specific staff member): CLINICAL (MARCY)    What was the call regarding: PATIENT ATTEMPTED TO CALL NUMBER THAT MARCY GAVE HIM FOR RESCHEDULING WITH NEUROLOGY. PATIENT HAD APPOINTMENT PRIOR BUT MISSED IT. THEY ARE ADVISING PATIENT THAT HE NEEDS A WHOLE NEW REFERRAL    Is it okay if the provider responds through MyChart: NO PLEASE CALL ONCE REFERRAL HAS BEEN SENT AGAIN

## 2025-01-15 NOTE — TELEPHONE ENCOUNTER
Caller: Chirag Mata    Relationship: Self    Best call back number: [phone]     What is the best time to reach you: ANYTIME    Who are you requesting to speak with (clinical staff, provider,  specific staff member): CLINICAL    What was the call regarding: 10/03/24 REFERRAL WENT TO [facility]UR[facility] FOR PATIENT. HE HAD AN APPOINTMENT WITH THEM BUT MISSED IT. PATIENT IS WANTING TO GET IN TOUCH WITH THEM BUT UNABLE TO RECALL PROVIDERS NAME OR THE OFFICE NUMBER TO CALL    Is it okay if the provider responds through MyChart: NO

## 2025-01-22 DIAGNOSIS — R10.9 RIGHT FLANK PAIN: Primary | ICD-10-CM

## 2025-01-27 NOTE — PROGRESS NOTES
Subjective    Mr. Mata is 49 y.o. male    Chief Complaint: Follow-up bothersome LUTS right flank pain    History of Present Illness    49-year-old male established patient in for follow-up regarding previously reported new onset bothersome LUTS and right sided flank pain.  Patient reports since last visit experienced 1 episode of blood in urine, feels as though he must have passed a stone as the right sided flank pain has resolved along with the bothersome lower urinary tract symptoms.  Patient states he never started taking the tamsulosin as he was worried about side effects and his symptoms resolved shortly after office visit anyways.      Family history of prostate CA and BPH in his uncles.  Hx of testicular torsion in middle school.     History of kidney stones with spontaneous passage.  Patient with new complaint of right sided low to mid back pain with radiating down even into the right hip by area.  Previously underwent KUB imaging no renal or ureteral stones seen.    PSA: 0.837 9/18/24              0.68     2/22/19    The following portions of the patient's history were reviewed and updated as appropriate: allergies, current medications, past family history, past medical history, past social history, past surgical history and problem list.    Review of Systems   Constitutional:  Negative for chills and fever.   Gastrointestinal:  Negative for abdominal pain, anal bleeding and blood in stool.   Genitourinary:  Negative for decreased urine volume, dysuria, flank pain, frequency, hematuria and urgency.         Current Outpatient Medications:     amitriptyline (ELAVIL) 75 MG tablet, Take 1 tablet by mouth Every Night., Disp: 90 tablet, Rfl: 1    amLODIPine (NORVASC) 10 MG tablet, Take 1 tablet by mouth Daily., Disp: 30 tablet, Rfl: 11    carBAMazepine (TEGretol) 200 MG tablet, TAKE 2 TABLETS BY MOUTH TWICE DAILY, Disp: 120 tablet, Rfl: 5    cetirizine (zyrTEC) 10 MG tablet, Take 1 tablet by mouth Daily.,  Disp: 30 tablet, Rfl: 0    fluticasone (FLONASE) 50 MCG/ACT nasal spray, 2 sprays into the nostril(s) as directed by provider Daily., Disp: 18.2 g, Rfl: 0    Hydrocort-Pramoxine, Perianal, (PROCTOFOAM-HS) 1-1 % rectal foam, Insert 1 Application into the rectum 2 (Two) Times a Day., Disp: 10 g, Rfl: 0    Hydrocortisone, Perianal, (Anusol-HC) 2.5 % rectal cream, Insert  into the rectum 2 (Two) Times a Day., Disp: 28 g, Rfl: 0    metoprolol tartrate (LOPRESSOR) 50 MG tablet, Take 1 tablet by mouth 2 (Two) Times a Day., Disp: 180 tablet, Rfl: 0    potassium chloride ER (K-TAB) 20 MEQ tablet controlled-release ER tablet, 1 tablet Daily. Takes 1/2 tablet daily., Disp: , Rfl:     sildenafil (VIAGRA) 50 MG tablet, Take 1 tablet by mouth Daily As Needed for Erectile Dysfunction., Disp: 30 tablet, Rfl: 5    tamsulosin (FLOMAX) 0.4 MG capsule 24 hr capsule, Take 1 capsule by mouth Every Night for 360 days., Disp: 30 capsule, Rfl: 11    Past Medical History:   Diagnosis Date    Drug abuse     Headache     Hypertension     Seizures        Past Surgical History:   Procedure Laterality Date    COLONOSCOPY N/A 11/4/2024    Procedure: COLONOSCOPY WITH ANESTHESIA;  Surgeon: Charli Garcia DO;  Location: [facility] ENDOSCOPY;  Service: Gastroenterology;  Laterality: N/A;  pre: rectal bleed  post: polyps  maya mendoza    CRANIECTOMY  2015    TESTICLE TORSION REPAIR      AS A CHILD       Social History     Socioeconomic History    Marital status: Single   Tobacco Use    Smoking status: Every Day     Current packs/day: 1.00     Average packs/day: 1 pack/day for 20.0 years (20.0 ttl pk-yrs)     Types: Cigarettes    Smokeless tobacco: Never    Tobacco comments:     Smokes less than 1 pack per day.   Vaping Use    Vaping status: Never Used   Substance and Sexual Activity    Alcohol use: Yes     Alcohol/week: 4.0 standard drinks of alcohol     Types: 4 Cans of beer per week     Comment: ocassionally    Drug use: Not Currently     Types:  "Hydrocodone, Marijuana, \"Crack\" cocaine     Comment: HX OF DRUG ABUSE, Xanax. Patient has not done drugs in 5 years.    Sexual activity: Yes     Partners: Female       Family History   Problem Relation Age of Onset    Hypertension Mother     Colon cancer Neg Hx     Colon polyps Neg Hx     Esophageal cancer Neg Hx        Objective    Temp 97.5 °F (36.4 °C)   Ht 188 cm (74.02\")   Wt 105 kg (232 lb)   BMI 29.77 kg/m²     Physical Exam  Constitutional:       Appearance: Normal appearance.   Abdominal:      Tenderness: There is no right CVA tenderness or left CVA tenderness.   Skin:     General: Skin is warm and dry.   Neurological:      Mental Status: He is alert and oriented to person, place, and time.   Psychiatric:         Mood and Affect: Mood normal.         Behavior: Behavior normal.             Results for orders placed or performed in visit on 01/27/25   POC Urinalysis Dipstick, Multipro    Collection Time: 01/31/25  3:22 PM    Specimen: Urine   Result Value Ref Range    Color Yellow Yellow, Straw, Dark Yellow, Nessa    Clarity, UA Clear Clear    Glucose, UA Negative Negative mg/dL    Bilirubin Negative Negative    Ketones, UA Negative Negative    Specific Gravity  1.015 1.005 - 1.030    Blood, UA Negative Negative    pH, Urine 7.0 5.0 - 8.0    Protein, POC Negative Negative mg/dL    Urobilinogen, UA 0.2 E.U./dL Normal, 0.2 E.U./dL    Nitrite, UA Negative Negative    Leukocytes Negative Negative   US Renal Bilateral (01/28/2025 15:19)   Assessment and Plan    Diagnoses and all orders for this visit:    1. Flank pain [R10.9] (Primary)    2. Family hx of prostate cancer  -     PSA DIAGNOSTIC; Future        Patient insurance would not approve CT therefore went for renal ultrasound imaging that revealed no hydronephrosis.  No ureteral stones seen.  Patient does appear to have what appears to be nephrocalcinosis.  No treatment warranted.    Encouraged patient if lower urinary tract symptoms recur to start using " the tamsulosin as previously prescribed.  Urinalysis today is clear no signs of blood noted or infection.  Patient may have very likely passed a stone.  Have stressed the importance of patient returning if blood in urine returns.    For now we will follow-up in 1 year with PSA prior

## 2025-01-28 ENCOUNTER — HOSPITAL ENCOUNTER (OUTPATIENT)
Dept: ULTRASOUND IMAGING | Facility: HOSPITAL | Age: 50
Discharge: HOME | End: 2025-01-28
Payer: COMMERCIAL

## 2025-01-28 DIAGNOSIS — R10.9 RIGHT FLANK PAIN: ICD-10-CM

## 2025-01-28 PROCEDURE — 76775 US EXAM ABDO BACK WALL LIM: CPT

## 2025-01-31 ENCOUNTER — OFFICE VISIT (OUTPATIENT)
Dept: UROLOGY | Facility: CLINIC | Age: 50
End: 2025-01-31
Payer: MEDICAID

## 2025-01-31 ENCOUNTER — TELEPHONE (OUTPATIENT)
Dept: INTERNAL MEDICINE | Facility: CLINIC | Age: 50
End: 2025-01-31
Payer: COMMERCIAL

## 2025-01-31 VITALS — TEMPERATURE: 97.5 F | HEIGHT: 74 IN | WEIGHT: 232 LBS | BODY MASS INDEX: 29.77 KG/M2

## 2025-01-31 DIAGNOSIS — Z80.42 FAMILY HX OF PROSTATE CANCER: ICD-10-CM

## 2025-01-31 DIAGNOSIS — I10 PRIMARY HYPERTENSION: Primary | ICD-10-CM

## 2025-01-31 DIAGNOSIS — R10.9 FLANK PAIN: Primary | ICD-10-CM

## 2025-01-31 RX ORDER — METOPROLOL TARTRATE 50 MG
50 TABLET ORAL 2 TIMES DAILY
Qty: 180 TABLET | Refills: 0 | Status: SHIPPED | OUTPATIENT
Start: 2025-01-31

## 2025-01-31 RX ORDER — METOPROLOL TARTRATE 50 MG
50 TABLET ORAL DAILY
Status: CANCELLED | OUTPATIENT
Start: 2025-01-31

## 2025-01-31 NOTE — TELEPHONE ENCOUNTER
Caller: Chirag Mata    Relationship: Self    Best call back number:     [phone] (       Requested Prescriptions:     METOPROLOL TARTRATE PO          Pharmacy where request should be sent: [facility] [address] [place] [address] [phone] [facility] [phone]      Last office visit with prescribing clinician: 9/12/2024   Last telemedicine visit with prescribing clinician: Visit date not found   Next office visit with prescribing clinician: 3/12/2025     Additional details provided by patient: 2 LEFT    Does the patient have less than a 3 day supply:  [x] Yes  [] No    Would you like a call back once the refill request has been completed: [x] Yes [] No    If the office needs to give you a call back, can they leave a voicemail: [x] Yes [] No    Juan Thompson Rep   01/31/25 09:25 CST

## 2025-01-31 NOTE — TELEPHONE ENCOUNTER
Lopressor is listed in his chart but when he established care I noted that he was taking Toprol-XL.  Can we confirm which when he has been taking? And the frequency? Metoprolol succinate or metoprolol tartrate would be the alternative names.   Double O-Z Plasty Text: The defect edges were debeveled with a #15 scalpel blade.  Given the location of the defect, shape of the defect and the proximity to free margins a Double O-Z plasty (double transposition flap) was deemed most appropriate.  Using a sterile surgical marker, the appropriate transposition flaps were drawn incorporating the defect and placing the expected incisions within the relaxed skin tension lines where possible. The area thus outlined was incised deep to adipose tissue with a #15 scalpel blade.  The skin margins were undermined to an appropriate distance in all directions utilizing iris scissors.  Hemostasis was achieved with electrocautery.  The flaps were then transposed into place, one clockwise and the other counterclockwise, and anchored with interrupted buried subcutaneous sutures.

## 2025-02-10 RX ORDER — POTASSIUM CHLORIDE 1500 MG/1
20 TABLET, EXTENDED RELEASE ORAL DAILY
Qty: 90 TABLET | Refills: 1 | Status: SHIPPED | OUTPATIENT
Start: 2025-02-10 | End: 2025-02-13

## 2025-02-13 ENCOUNTER — TELEPHONE (OUTPATIENT)
Dept: INTERNAL MEDICINE | Facility: CLINIC | Age: 50
End: 2025-02-13

## 2025-02-13 DIAGNOSIS — I10 PRIMARY HYPERTENSION: Primary | ICD-10-CM

## 2025-02-13 RX ORDER — POTASSIUM CHLORIDE 750 MG/1
10 CAPSULE, EXTENDED RELEASE ORAL DAILY
Qty: 90 CAPSULE | Refills: 0 | Status: SHIPPED | OUTPATIENT
Start: 2025-02-13

## 2025-02-13 NOTE — TELEPHONE ENCOUNTER
Pharmacy Name:  [facility]    Reference Number (if applicable):     Pharmacy representative name: SONA    Pharmacy representative phone number: [phone]     What medication are you calling in regards to:     potassium chloride ER (K-TAB) 20 MEQ tablet controlled-release ER tablet       What question does the pharmacy have: NEEDS CLARIFICATION ON DOSAGE

## 2025-03-05 VITALS — WEIGHT: 236 LBS | HEIGHT: 74 IN | BODY MASS INDEX: 30.29 KG/M2

## 2025-03-05 RX ORDER — AMLODIPINE BESYLATE 10 MG/1
10 TABLET ORAL DAILY
COMMUNITY

## 2025-03-05 RX ORDER — POTASSIUM CHLORIDE 1.5 G/1.58G
20 POWDER, FOR SOLUTION ORAL DAILY
COMMUNITY

## 2025-03-10 ENCOUNTER — TELEPHONE (OUTPATIENT)
Dept: INTERNAL MEDICINE | Facility: CLINIC | Age: 50
End: 2025-03-10

## 2025-03-10 NOTE — TELEPHONE ENCOUNTER
Caller: Chirag Mata    Relationship: Self    Best call back number: [phone] (Mobile)     Who are you requesting to speak with (clinical staff, provider,  specific staff member): CLINICAL    What was the call regarding: THE PATIENT STATES THAT HE THOUGHT HE HAD A NEUROLOGY APPOINTMENT TODAY AND STATES THAT HE SHOWED UP AT NEUROLOGY. THE PATIENT STATES THAT HE WOULD LIKE A CALLBACK TODAY ABOUT A NEUROLOGY REFERRAL.   PLEASE ADVISE.

## 2025-03-12 ENCOUNTER — OFFICE VISIT (OUTPATIENT)
Dept: INTERNAL MEDICINE | Facility: CLINIC | Age: 50
End: 2025-03-12
Payer: MEDICAID

## 2025-03-12 VITALS
SYSTOLIC BLOOD PRESSURE: 150 MMHG | HEIGHT: 74 IN | DIASTOLIC BLOOD PRESSURE: 92 MMHG | RESPIRATION RATE: 16 BRPM | HEART RATE: 81 BPM | TEMPERATURE: 97.7 F | BODY MASS INDEX: 30.03 KG/M2 | WEIGHT: 234 LBS | OXYGEN SATURATION: 98 %

## 2025-03-12 DIAGNOSIS — M79.644 CHRONIC PAIN OF RIGHT THUMB: ICD-10-CM

## 2025-03-12 DIAGNOSIS — M54.42 CHRONIC LEFT-SIDED LOW BACK PAIN WITH LEFT-SIDED SCIATICA: ICD-10-CM

## 2025-03-12 DIAGNOSIS — R79.89 ELEVATED SERUM CREATININE: Primary | ICD-10-CM

## 2025-03-12 DIAGNOSIS — R40.0 DAYTIME SOMNOLENCE: ICD-10-CM

## 2025-03-12 DIAGNOSIS — R56.9 SEIZURE: ICD-10-CM

## 2025-03-12 DIAGNOSIS — R06.83 SNORING: ICD-10-CM

## 2025-03-12 DIAGNOSIS — G89.29 CHRONIC PAIN OF RIGHT THUMB: ICD-10-CM

## 2025-03-12 DIAGNOSIS — G89.29 CHRONIC LEFT-SIDED LOW BACK PAIN WITH LEFT-SIDED SCIATICA: ICD-10-CM

## 2025-03-12 DIAGNOSIS — I10 PRIMARY HYPERTENSION: ICD-10-CM

## 2025-03-12 DIAGNOSIS — N52.9 ERECTILE DYSFUNCTION, UNSPECIFIED ERECTILE DYSFUNCTION TYPE: ICD-10-CM

## 2025-03-12 DIAGNOSIS — M79.674 TOE PAIN, RIGHT: ICD-10-CM

## 2025-03-12 PROCEDURE — 1159F MED LIST DOCD IN RCRD: CPT

## 2025-03-12 PROCEDURE — 1126F AMNT PAIN NOTED NONE PRSNT: CPT

## 2025-03-12 PROCEDURE — 1160F RVW MEDS BY RX/DR IN RCRD: CPT

## 2025-03-12 PROCEDURE — 99214 OFFICE O/P EST MOD 30 MIN: CPT

## 2025-03-12 RX ORDER — METHYLPREDNISOLONE 4 MG/1
TABLET ORAL
Qty: 21 TABLET | Refills: 0 | Status: SHIPPED | OUTPATIENT
Start: 2025-03-12

## 2025-03-12 NOTE — PROGRESS NOTES
Subjective     Chief Complaint:  Follow up seizures, hypertension, erectile dysfunction    HPI:  Patient presents today for the above problems. Please see assessment and plan below.    Past Medical History:   Past Medical History:   Diagnosis Date    Drug abuse     Headache     Hypertension     Seizures      Past Surgical History:  Past Surgical History:   Procedure Laterality Date    COLONOSCOPY N/A 11/4/2024    Procedure: COLONOSCOPY WITH ANESTHESIA;  Surgeon: Charli Garcia DO;  Location: [facility] ENDOSCOPY;  Service: Gastroenterology;  Laterality: N/A;  pre: rectal bleed  post: polyps  maya reji    CRANIECTOMY  2015    TESTICLE TORSION REPAIR      AS A CHILD       Allergies:  Allergies   Allergen Reactions    Bactrim [Sulfamethoxazole-Trimethoprim] Nausea And Vomiting            Medications:  Prior to Admission medications    Medication Sig Start Date End Date Taking? Authorizing Provider   amitriptyline (ELAVIL) 75 MG tablet Take 1 tablet by mouth Every Night. 10/29/24   Maya Sevilla APRN   amLODIPine (NORVASC) 10 MG tablet Take 1 tablet by mouth Daily. 10/29/24   Maya Sevilla APRN   carBAMazepine (TEGretol) 200 MG tablet TAKE 2 TABLETS BY MOUTH TWICE DAILY 8/1/19   Viviane Glasgow APRN   cetirizine (zyrTEC) 10 MG tablet Take 1 tablet by mouth Daily. 8/16/24   Maya Sevilla APRN   fluticasone (FLONASE) 50 MCG/ACT nasal spray 2 sprays into the nostril(s) as directed by provider Daily. 8/16/24   Maya Sevilla APRN   Hydrocort-Pramoxine, Perianal, (PROCTOFOAM-HS) 1-1 % rectal foam Insert 1 Application into the rectum 2 (Two) Times a Day. 8/16/24   Maya Sevilla APRN   Hydrocortisone, Perianal, (Anusol-HC) 2.5 % rectal cream Insert  into the rectum 2 (Two) Times a Day. 8/16/24   Maya Sevilla APRN   metoprolol tartrate (LOPRESSOR) 50 MG tablet Take 1 tablet by mouth 2 (Two) Times a Day. 1/31/25   Maya Sevilla APRN   potassium chloride (MICRO-K) 10 MEQ CR capsule Take 1  "capsule by mouth Daily. 2/13/25   Maria Victoria Sevilla, APRN   sildenafil (VIAGRA) 50 MG tablet Take 1 tablet by mouth Daily As Needed for Erectile Dysfunction. 1/13/25   ETHAN Matute DO   tamsulosin (FLOMAX) 0.4 MG capsule 24 hr capsule Take 1 capsule by mouth Every Night for 360 days. 10/25/24 10/20/25  Haley Doyle APRN       Objective     Vital Signs: /92 (BP Location: Left arm, Patient Position: Sitting, Cuff Size: Large Adult)   Pulse 81   Temp 97.7 °F (36.5 °C) (Infrared)   Resp 16   Ht 188 cm (74\")   Wt 106 kg (234 lb)   SpO2 98%   BMI 30.04 kg/m²   Physical Exam  Constitutional:       Appearance: Normal appearance. He is obese.   Cardiovascular:      Rate and Rhythm: Normal rate and regular rhythm.      Pulses: Normal pulses.      Heart sounds: Normal heart sounds. No murmur heard.  Pulmonary:      Effort: Pulmonary effort is normal. No respiratory distress.      Breath sounds: Normal breath sounds. No wheezing.   Neurological:      Mental Status: He is alert and oriented to person, place, and time. Mental status is at baseline.      Motor: No weakness.       Results Reviewed:  No new results available for review.    Assessment / Plan     Assessment/Plan:  Diagnoses and all orders for this visit:    1. Elevated serum creatinine (Primary)  -     Comprehensive Metabolic Panel  -     Microalbumin / Creatinine Urine Ratio - Urine, Clean Catch    2. Snoring  -     Home Sleep Study; Future    3. Daytime somnolence  -     Home Sleep Study; Future    4. Toe pain, right  -     Ambulatory Referral to Podiatry    5. Chronic pain of right thumb  -     methylPREDNISolone (MEDROL) 4 MG dose pack; Take as directed on package instructions.  Dispense: 21 tablet; Refill: 0    6. Chronic left-sided low back pain with left-sided sciatica  -     methylPREDNISolone (MEDROL) 4 MG dose pack; Take as directed on package instructions.  Dispense: 21 tablet; Refill: 0    7. Seizure       History of Present " Illness  The patient is a 49-year-old male who presents today for a 6-month follow-up on seizures and hypertension.    He has experienced three episodes of syncope in the past month and a half, with the most recent episode occurring two weeks ago. These episodes are characterized by sudden loss of consciousness, eye rolling, and hand movements. He reports no chest pain or palpitations prior to these episodes. He has no history of cardiac issues. He remains compliant with taking carbamazepine 200 mg daily. This had previously been prescribed by neurology.  He is no longer established with them but still had a large supply of the medication available.  He has previously been unable to tolerate Keppra.  His significant other encouraged him to go to the emergency department after one of these episodes occurred but the patient declined.  He was referred to neurology in October 2024 and was scheduled to see neurology at [facility] yesterday but there was some confusion regarding his insurance.    He has a history of sleep apnea but does not use a CPAP machine. He has undergone a sleep study prior to his second brain surgery in 2019. He reports persistent daytime somnolence even after adequate sleep.      He continues to experience back pain. He also reports intermittent sciatic pain, which he manages with ibuprofen.    He reports developing a blister on his toenail after using a foot warmer inside of his steel toe boots.  This continues to cause him significant pain.  He denies numbness or tingling.  There is some discoloration of the toe.  He is very concerned regarding the sensitivity of his toenail.    He is on Viagra and requests a refill.    Assessment & Plan  1. Seizures.  He is currently on Tegretol 200 mg once daily, which is below the standard starting dose of 200 mg twice daily. He is advised to increase his Tegretol dosage to 200 mg twice daily, once in the morning and once at night. If he experiences any side  effects, he should reach out. A repeat sleep study will be ordered. He is also advised to seek immediate medical attention at the ER if he experiences another seizure-like or syncopal episode. If [facility] neurology does not accept his insurance, a referral to [facility] will be made.  He had previously been referred to [facility] due to wait time for [facility] neurology.    2. Hypertension.  His blood pressure readings were suboptimal during this visit. He is advised to monitor his blood pressure regularly and avoid smoking before measurements.    3. Elevated serum creatinine.  His creatinine was elevated in September 2024. A recheck of his kidney function and potassium levels will be conducted today. Will also check urine microalbumin/creatinine ratio.    4. Right toe pain.  Unable to identify any obvious abnormalities of the toenail at this time.  The toe is discolored and the toenail is extremely sensitive to touch.  It is unlikely that imaging would be helpful at this time.  A referral to podiatry will be made for further evaluation and management.    5. Erectile dysfunction.  He is advised to contact his pharmacy for a refill of his Viagra prescription, as he should have refills available from a previous prescription sent in January 2025.    6. Low back pain. Right thumb pain.  He is advised to take Tylenol Arthritis every 6 hours as needed for pain. He should avoid NSAIDs such as ibuprofen due to decreased kidney function. He can also use Voltaren gel topically for pain relief. A prescription for a steroid taper will be provided. If the thumb pain persists, an x-ray may be considered, and a referral to sports medicine for a potential intra-articular steroid injection.    Follow-up  The patient will follow up in 6 months, or earlier if necessary.    Return in about 6 months (around 9/12/2025). unless patient needs to be seen sooner or acute issues arise.    Patient or patient representative verbalized consent for the use of  Ambient Listening during the visit with  ROBERT Huynh for chart documentation. 3/13/2025  15:31 CDT    I have discussed the patient results/orders and and plan/recommendation with them at today's visit.      ROBERT Huynh   03/12/2025

## 2025-03-12 NOTE — TELEPHONE ENCOUNTER
Called [facility], stated they did not take his insurance, will send newer card and they said they would run it and reschedule if they take that insurance.  Has appointment on 3/12/25 with Maria Victoria and she will discuss then.

## 2025-03-13 DIAGNOSIS — R79.89 LOW TESTOSTERONE IN MALE: ICD-10-CM

## 2025-03-13 DIAGNOSIS — N52.9 ERECTILE DYSFUNCTION, UNSPECIFIED ERECTILE DYSFUNCTION TYPE: ICD-10-CM

## 2025-03-13 LAB
ALBUMIN SERPL-MCNC: 4.6 G/DL (ref 3.5–5.2)
ALBUMIN/CREAT UR: 5 MG/G CREAT (ref 0–29)
ALBUMIN/GLOB SERPL: 1.6 G/DL
ALP SERPL-CCNC: 96 U/L (ref 39–117)
ALT SERPL-CCNC: 20 U/L (ref 1–41)
AST SERPL-CCNC: 21 U/L (ref 1–40)
BILIRUB SERPL-MCNC: <0.2 MG/DL (ref 0–1.2)
BUN SERPL-MCNC: 12 MG/DL (ref 6–20)
BUN/CREAT SERPL: 9.8 (ref 7–25)
CALCIUM SERPL-MCNC: 10 MG/DL (ref 8.6–10.5)
CHLORIDE SERPL-SCNC: 101 MMOL/L (ref 98–107)
CO2 SERPL-SCNC: 25.5 MMOL/L (ref 22–29)
CREAT SERPL-MCNC: 1.23 MG/DL (ref 0.76–1.27)
CREAT UR-MCNC: 146.1 MG/DL
EGFRCR SERPLBLD CKD-EPI 2021: 72 ML/MIN/1.73
GLOBULIN SER CALC-MCNC: 2.8 GM/DL
GLUCOSE SERPL-MCNC: 78 MG/DL (ref 65–99)
MICROALBUMIN UR-MCNC: 7.1 UG/ML
POTASSIUM SERPL-SCNC: 4.2 MMOL/L (ref 3.5–5.2)
PROT SERPL-MCNC: 7.4 G/DL (ref 6–8.5)
SODIUM SERPL-SCNC: 138 MMOL/L (ref 136–145)

## 2025-03-13 RX ORDER — SILDENAFIL 50 MG/1
50 TABLET, FILM COATED ORAL DAILY PRN
Qty: 30 TABLET | Refills: 5 | OUTPATIENT
Start: 2025-03-13

## 2025-03-18 ENCOUNTER — TELEPHONE (OUTPATIENT)
Dept: INTERNAL MEDICINE | Facility: CLINIC | Age: 50
End: 2025-03-18
Payer: MEDICAID

## 2025-04-28 RX ORDER — AMITRIPTYLINE HYDROCHLORIDE 75 MG/1
75 TABLET ORAL NIGHTLY
Qty: 90 TABLET | Refills: 1 | Status: SHIPPED | OUTPATIENT
Start: 2025-04-28

## 2025-05-13 ENCOUNTER — OFFICE VISIT (OUTPATIENT)
Dept: NEUROLOGY | Age: 50
End: 2025-05-13
Payer: MEDICAID

## 2025-05-13 VITALS
DIASTOLIC BLOOD PRESSURE: 78 MMHG | OXYGEN SATURATION: 97 % | HEIGHT: 74 IN | WEIGHT: 236 LBS | SYSTOLIC BLOOD PRESSURE: 128 MMHG | BODY MASS INDEX: 30.29 KG/M2 | HEART RATE: 88 BPM

## 2025-05-13 DIAGNOSIS — G40.909 SEIZURE DISORDER (HCC): ICD-10-CM

## 2025-05-13 DIAGNOSIS — G93.5 CHIARI I MALFORMATION (HCC): ICD-10-CM

## 2025-05-13 DIAGNOSIS — R55 SYNCOPE, UNSPECIFIED SYNCOPE TYPE: Primary | ICD-10-CM

## 2025-05-13 PROCEDURE — 3078F DIAST BP <80 MM HG: CPT | Performed by: PSYCHIATRY & NEUROLOGY

## 2025-05-13 PROCEDURE — 99204 OFFICE O/P NEW MOD 45 MIN: CPT | Performed by: PSYCHIATRY & NEUROLOGY

## 2025-05-13 PROCEDURE — 3074F SYST BP LT 130 MM HG: CPT | Performed by: PSYCHIATRY & NEUROLOGY

## 2025-05-13 RX ORDER — AMITRIPTYLINE HYDROCHLORIDE 75 MG/1
75 TABLET ORAL NIGHTLY
COMMUNITY
Start: 2025-04-29

## 2025-05-13 RX ORDER — METOPROLOL TARTRATE 50 MG
50 TABLET ORAL 2 TIMES DAILY
COMMUNITY
Start: 2025-01-31

## 2025-05-13 NOTE — PROGRESS NOTES
[facility] Office Note      Patient:   Petey Funez  MR#:    [id number]  Account Number:                         YOB: 1975  Date of Evaluation:  5/13/2025  Time of Note:                          2:32 PM  Primary/Referring Physician:  Diana Milan APRN - NP   Consulting Physician:  Howard Vu DO    NEW PATIENT CONSULTATION    Chief Complaint   Patient presents with    New Patient    Seizures     Last seizure was 5-3-2025    Medication Refill     Is needing a refill of the tizanidine please        HISTORY OF PRESENT ILLNESS    Petey Funez is a 49 y.o. year old male here for seizure disorder.  The patient also has a history of underlying Chiari malformation as well as a prior resection of a meningioma.  He had a bifrontal craniotomy. Currently on Tegretol 400 mg bid. Prior EEG with right hemispheric slowing.  Seizure started after prior craniotomy back in 2018.  Most recent events described as staring, confusional episodes, tonic stiffening, DEVON noted. No overt GTC activity.  Does not recall the events.  Can be triggered with laughter, cough, so forth.  No chest pain, palpitations.  No history of TBI, meningitis or encephalitis.  No family history of seizures.       Past Medical History:   Diagnosis Date    Chiari I malformation (HCC)     Drug abuse (HCC)     Headache     Hx of resection of meningioma     Hypertension        Past Surgical History:   Procedure Laterality Date    BRAIN SURGERY  2015    tumor removal    TESTICLE TORSION REDUCTION         No family history on file.    Social History     Socioeconomic History    Marital status: Single     Spouse name: Not on file    Number of children: Not on file    Years of education: Not on file    Highest education level: Not on file   Occupational History    Not on file   Tobacco Use    Smoking status: Every Day     Current packs/day: 0.50     Types: Cigarettes     Passive exposure: Current    Smokeless tobacco: Never   Vaping

## 2025-05-30 ENCOUNTER — HOSPITAL ENCOUNTER (OUTPATIENT)
Age: 50
End: 2025-05-30
Attending: PSYCHIATRY & NEUROLOGY
Payer: MEDICAID

## 2025-05-30 ENCOUNTER — HOSPITAL ENCOUNTER (OUTPATIENT)
Dept: NEUROLOGY | Age: 50
Discharge: HOME | End: 2025-05-30
Attending: PSYCHIATRY & NEUROLOGY
Payer: MEDICAID

## 2025-05-30 ENCOUNTER — HOSPITAL ENCOUNTER (OUTPATIENT)
Age: 50
Discharge: HOME | End: 2025-05-30
Attending: PSYCHIATRY & NEUROLOGY
Payer: MEDICAID

## 2025-05-30 ENCOUNTER — RESULTS FOLLOW-UP (OUTPATIENT)
Dept: NEUROLOGY | Age: 50
End: 2025-05-30

## 2025-05-30 ENCOUNTER — HOSPITAL ENCOUNTER (OUTPATIENT)
Dept: MRI IMAGING | Age: 50
Discharge: HOME | End: 2025-05-30
Attending: PSYCHIATRY & NEUROLOGY
Payer: MEDICAID

## 2025-05-30 VITALS
DIASTOLIC BLOOD PRESSURE: 86 MMHG | WEIGHT: 230 LBS | BODY MASS INDEX: 29.52 KG/M2 | SYSTOLIC BLOOD PRESSURE: 127 MMHG | HEART RATE: 74 BPM | HEIGHT: 74 IN

## 2025-05-30 DIAGNOSIS — R55 SYNCOPE, UNSPECIFIED SYNCOPE TYPE: ICD-10-CM

## 2025-05-30 LAB
ECHO AO ASC DIAM: 3.1 CM
ECHO AO ASCENDING AORTA INDEX: 1.34 CM/M2
ECHO AO ROOT DIAM: 2.4 CM
ECHO AO ROOT INDEX: 1.04 CM/M2
ECHO AO SINUS VALSALVA DIAM: 3 CM
ECHO AO SINUS VALSALVA INDEX: 1.3 CM/M2
ECHO AO ST JNCT DIAM: 2.5 CM
ECHO AV AREA PEAK VELOCITY: 2 CM2
ECHO AV AREA VTI: 2 CM2
ECHO AV AREA/BSA PEAK VELOCITY: 0.9 CM2/M2
ECHO AV AREA/BSA VTI: 0.9 CM2/M2
ECHO AV MEAN GRADIENT: 5 MMHG
ECHO AV MEAN VELOCITY: 1.1 M/S
ECHO AV PEAK GRADIENT: 9 MMHG
ECHO AV PEAK VELOCITY: 1.5 M/S
ECHO AV VELOCITY RATIO: 0.6
ECHO AV VTI: 31.7 CM
ECHO BSA: 2.33 M2
ECHO BSA: 2.33 M2
ECHO LA AREA 2C: 19.6 CM2
ECHO LA AREA 4C: 20.9 CM2
ECHO LA DIAMETER INDEX: 1.65 CM/M2
ECHO LA DIAMETER: 3.8 CM
ECHO LA MAJOR AXIS: 5.6 CM
ECHO LA MINOR AXIS: 5.4 CM
ECHO LA TO AORTIC ROOT RATIO: 1.58
ECHO LA VOL BP: 60 ML (ref 18–58)
ECHO LA VOL MOD A2C: 56 ML (ref 18–58)
ECHO LA VOL MOD A4C: 63 ML (ref 18–58)
ECHO LA VOL/BSA BIPLANE: 26 ML/M2 (ref 16–34)
ECHO LA VOLUME INDEX MOD A2C: 24 ML/M2 (ref 16–34)
ECHO LA VOLUME INDEX MOD A4C: 27 ML/M2 (ref 16–34)
ECHO LV E' LATERAL VELOCITY: 11.3 CM/S
ECHO LV E' SEPTAL VELOCITY: 10.6 CM/S
ECHO LV EDV A2C: 159 ML
ECHO LV EDV A4C: 140 ML
ECHO LV EDV INDEX A4C: 61 ML/M2
ECHO LV EDV NDEX A2C: 69 ML/M2
ECHO LV EF PHYSICIAN: 60 %
ECHO LV EJECTION FRACTION A2C: 58 %
ECHO LV EJECTION FRACTION A4C: 57 %
ECHO LV EJECTION FRACTION BIPLANE: 58 % (ref 55–100)
ECHO LV ESV A2C: 67 ML
ECHO LV ESV A4C: 60 ML
ECHO LV ESV INDEX A2C: 29 ML/M2
ECHO LV ESV INDEX A4C: 26 ML/M2
ECHO LV FRACTIONAL SHORTENING: 28 % (ref 28–44)
ECHO LV GLOBAL LONGITUDINAL STRAIN (GLS): -18.3 %
ECHO LV INTERNAL DIMENSION DIASTOLE INDEX: 2.16 CM/M2
ECHO LV INTERNAL DIMENSION DIASTOLIC: 5 CM (ref 4.2–5.9)
ECHO LV INTERNAL DIMENSION SYSTOLIC INDEX: 1.56 CM/M2
ECHO LV INTERNAL DIMENSION SYSTOLIC: 3.6 CM
ECHO LV IVSD: 0.9 CM (ref 0.6–1)
ECHO LV MASS 2D: 158.2 G (ref 88–224)
ECHO LV MASS INDEX 2D: 68.5 G/M2 (ref 49–115)
ECHO LV POSTERIOR WALL DIASTOLIC: 0.9 CM (ref 0.6–1)
ECHO LV RELATIVE WALL THICKNESS RATIO: 0.36
ECHO LVOT AREA: 3.5 CM2
ECHO LVOT AV VTI INDEX: 0.58
ECHO LVOT DIAM: 2.1 CM
ECHO LVOT MEAN GRADIENT: 1 MMHG
ECHO LVOT PEAK GRADIENT: 3 MMHG
ECHO LVOT PEAK VELOCITY: 0.9 M/S
ECHO LVOT STROKE VOLUME INDEX: 27.4 ML/M2
ECHO LVOT SV: 63.4 ML
ECHO LVOT VTI: 18.3 CM
ECHO MV A VELOCITY: 0.81 M/S
ECHO MV AREA VTI: 2.3 CM2
ECHO MV E DECELERATION TIME (DT): 143 MS
ECHO MV E VELOCITY: 0.63 M/S
ECHO MV E/A RATIO: 0.78
ECHO MV E/E' LATERAL: 5.58
ECHO MV E/E' RATIO (AVERAGED): 5.76
ECHO MV E/E' SEPTAL: 5.94
ECHO MV LVOT VTI INDEX: 1.49
ECHO MV MAX VELOCITY: 0.9 M/S
ECHO MV MEAN GRADIENT: 1 MMHG
ECHO MV MEAN VELOCITY: 0.5 M/S
ECHO MV PEAK GRADIENT: 3 MMHG
ECHO MV VTI: 27.2 CM
ECHO RA AREA 4C: 13.9 CM2
ECHO RA END SYSTOLIC VOLUME APICAL 4 CHAMBER INDEX BSA: 13 ML/M2
ECHO RA VOLUME: 31 ML
ECHO RV BASAL DIMENSION: 3.5 CM
ECHO RV LONGITUDINAL DIMENSION: 7.6 CM
ECHO RV MID DIMENSION: 3.1 CM
ECHO RV TAPSE: 2 CM (ref 1.7–?)
ECHO TV REGURGITANT MAX VELOCITY: 2.07 M/S
ECHO TV REGURGITANT PEAK GRADIENT: 17 MMHG

## 2025-05-30 PROCEDURE — 95819 EEG AWAKE AND ASLEEP: CPT | Performed by: PSYCHIATRY & NEUROLOGY

## 2025-05-30 PROCEDURE — 95816 EEG AWAKE AND DROWSY: CPT

## 2025-05-30 PROCEDURE — 70553 MRI BRAIN STEM W/O & W/DYE: CPT

## 2025-05-30 PROCEDURE — 6360000004 HC RX CONTRAST MEDICATION: Performed by: PSYCHIATRY & NEUROLOGY

## 2025-05-30 PROCEDURE — 93356 MYOCRD STRAIN IMG SPCKL TRCK: CPT

## 2025-05-30 PROCEDURE — 93246 EXT ECG>7D<15D RECORDING: CPT

## 2025-05-30 PROCEDURE — 93306 TTE W/DOPPLER COMPLETE: CPT | Performed by: INTERNAL MEDICINE

## 2025-05-30 PROCEDURE — A9577 INJ MULTIHANCE: HCPCS | Performed by: PSYCHIATRY & NEUROLOGY

## 2025-05-30 PROCEDURE — 93356 MYOCRD STRAIN IMG SPCKL TRCK: CPT | Performed by: INTERNAL MEDICINE

## 2025-05-30 RX ADMIN — GADOBENATE DIMEGLUMINE 20 ML: 529 INJECTION, SOLUTION INTRAVENOUS at 09:56

## 2025-05-30 NOTE — PROCEDURES
ADULT OUTPATIENT ELECTROENCEPHALOGRAM REPORT    Patient:   Petey Funez  MR#:    [id number]  YOB: 1975  Date of Evaluation:  5/30/2025  Primary Physician:     Diana Milan APRN - NP   Referring Physician:   Howard Vu DO      CLINICAL INFORMATION:     This patient is a 49 y.o. male with a history of seizure vs syncope.     MEDICATIONS:     See MAR.    RECORDING CONDITIONS:     This EEG was performed utilizing standard International 10-20 System of electrode placement, with additional channels monitored for eye movement. One channel electrocardiogram was monitored. Data was obtained, stored, and interpreted according to ACNS guidelines (J Clin Neurophysiol 2006;23(2):) utilizing referential montage recording, with reformatting to longitudinal, transverse bipolar, and referential montages as necessary for interpretation, along with the digital/automated EEG analysis. Patient tolerated entire procedure well. Photic stimulation and hyperventilation were utilized as activation procedures unless otherwise specified below.     E.E.G. DESCRIPTION:     The resting predominant posterior background frequency is a 9-10 Hz 30-40 uV rhythm.  No overt focal, lateralizing, or paroxysmal abnormalities were noted through the recording. Drowsiness was demonstrated by slow rolling eye movements followed by a loss of the background waking activities. Onset of stage I sleep was demonstrated by gradual disappearance of background waking rhythms with gradual symmetric mixed frequency 4-7 Hz slowing.  Stage II sleep was characterized by vertex transients, sleep-spindles, and K-complexes, at times with shifting asymmetry demonstrated.  Photic stimulation was performed and had little change on the recording. Muscle, motion, and eye movement artifacts were noted.       EEG INTERPRETATION:    Normal EEG for age in the awake, drowsy, and sleep states.       CLINICAL CORRELATION:     The absence of epileptiform

## 2025-06-11 DIAGNOSIS — N52.9 ERECTILE DYSFUNCTION, UNSPECIFIED ERECTILE DYSFUNCTION TYPE: ICD-10-CM

## 2025-06-11 DIAGNOSIS — R79.89 LOW TESTOSTERONE IN MALE: ICD-10-CM

## 2025-06-11 RX ORDER — SILDENAFIL 50 MG/1
50 TABLET, FILM COATED ORAL DAILY PRN
Qty: 30 TABLET | Refills: 5 | OUTPATIENT
Start: 2025-06-11

## 2025-06-18 LAB — ECHO BSA: 2.33 M2

## 2025-08-05 ENCOUNTER — OFFICE VISIT (OUTPATIENT)
Dept: INTERNAL MEDICINE | Facility: CLINIC | Age: 50
End: 2025-08-05
Payer: MEDICAID

## 2025-08-05 ENCOUNTER — PATIENT MESSAGE (OUTPATIENT)
Dept: NEUROLOGY | Age: 50
End: 2025-08-05

## 2025-08-05 VITALS
SYSTOLIC BLOOD PRESSURE: 124 MMHG | BODY MASS INDEX: 29.53 KG/M2 | HEART RATE: 77 BPM | OXYGEN SATURATION: 96 % | DIASTOLIC BLOOD PRESSURE: 88 MMHG | WEIGHT: 230.1 LBS | HEIGHT: 74 IN | TEMPERATURE: 97.4 F

## 2025-08-05 DIAGNOSIS — N52.9 ERECTILE DYSFUNCTION, UNSPECIFIED ERECTILE DYSFUNCTION TYPE: ICD-10-CM

## 2025-08-05 DIAGNOSIS — Z20.2 STD EXPOSURE: Primary | ICD-10-CM

## 2025-08-05 DIAGNOSIS — R79.89 LOW TESTOSTERONE IN MALE: ICD-10-CM

## 2025-08-05 DIAGNOSIS — K64.9 HEMORRHOIDS, UNSPECIFIED HEMORRHOID TYPE: ICD-10-CM

## 2025-08-05 DIAGNOSIS — I10 PRIMARY HYPERTENSION: ICD-10-CM

## 2025-08-05 PROCEDURE — 99213 OFFICE O/P EST LOW 20 MIN: CPT

## 2025-08-05 PROCEDURE — 1126F AMNT PAIN NOTED NONE PRSNT: CPT

## 2025-08-05 RX ORDER — SILDENAFIL 50 MG/1
50 TABLET, FILM COATED ORAL DAILY PRN
Qty: 30 TABLET | Refills: 5 | Status: SHIPPED | OUTPATIENT
Start: 2025-08-05

## 2025-08-05 RX ORDER — AMLODIPINE BESYLATE 10 MG/1
10 TABLET ORAL DAILY
Qty: 30 TABLET | Refills: 5 | Status: SHIPPED | OUTPATIENT
Start: 2025-08-05

## 2025-08-05 RX ORDER — LIDOCAINE 5 G/100G
1 CREAM RECTAL; TOPICAL 3 TIMES DAILY PRN
Qty: 28 G | Refills: 0 | Status: SHIPPED | OUTPATIENT
Start: 2025-08-05

## 2025-08-06 RX ORDER — DOCUSATE SODIUM 100 MG/1
100 CAPSULE, LIQUID FILLED ORAL 2 TIMES DAILY
Qty: 60 CAPSULE | Refills: 1 | Status: SHIPPED | OUTPATIENT
Start: 2025-08-06

## 2025-08-07 LAB
C TRACH RRNA SPEC QL NAA+PROBE: NEGATIVE
HIV 1+2 AB+HIV1 P24 AG SERPL QL IA: NON REACTIVE
HSV1 IGG SERPL QL IA: REACTIVE
HSV2 IGG SERPL QL IA: REACTIVE
N GONORRHOEA RRNA SPEC QL NAA+PROBE: NEGATIVE
RPR SER QL: NON REACTIVE

## (undated) DEVICE — THE SINGLE USE ETRAP – POLYP TRAP IS USED FOR SUCTION RETRIEVAL OF ENDOSCOPICALLY REMOVED POLYPS.: Brand: ETRAP

## (undated) DEVICE — Device: Brand: DEFENDO AIR/WATER/SUCTION AND BIOPSY VALVE

## (undated) DEVICE — THE CHANNEL CLEANING BRUSH IS A NYLON FLEXI BRUSH ATTACHED TO A FLEXIBLE PLASTIC SHEATH DESIGNED TO SAFELY REMOVE DEBRIS FROM FLEXIBLE ENDOSCOPES.

## (undated) DEVICE — SNAR POLYP CAPTIVATOR MICROHEX 13 240CM

## (undated) DEVICE — YANKAUER,BULB TIP WITH VENT: Brand: ARGYLE

## (undated) DEVICE — SENSR O2 OXIMAX FNGR A/ 18IN NONSTR

## (undated) DEVICE — MASK,OXYGEN,MED CONC,ADLT,7' TUB, UC: Brand: PENDING